# Patient Record
Sex: FEMALE | Race: WHITE | NOT HISPANIC OR LATINO | Employment: OTHER | URBAN - METROPOLITAN AREA
[De-identification: names, ages, dates, MRNs, and addresses within clinical notes are randomized per-mention and may not be internally consistent; named-entity substitution may affect disease eponyms.]

---

## 2017-01-02 ENCOUNTER — COMMUNICATION - HEALTHEAST (OUTPATIENT)
Dept: INTERNAL MEDICINE | Facility: CLINIC | Age: 62
End: 2017-01-02

## 2017-01-02 DIAGNOSIS — I10 HYPERTENSION: ICD-10-CM

## 2017-03-28 ENCOUNTER — COMMUNICATION - HEALTHEAST (OUTPATIENT)
Dept: SCHEDULING | Facility: CLINIC | Age: 62
End: 2017-03-28

## 2017-05-12 ENCOUNTER — COMMUNICATION - HEALTHEAST (OUTPATIENT)
Dept: INTERNAL MEDICINE | Facility: CLINIC | Age: 62
End: 2017-05-12

## 2017-05-12 DIAGNOSIS — Z12.83 SKIN EXAM, SCREENING FOR CANCER: ICD-10-CM

## 2017-05-22 ENCOUNTER — RECORDS - HEALTHEAST (OUTPATIENT)
Dept: ADMINISTRATIVE | Facility: OTHER | Age: 62
End: 2017-05-22

## 2017-06-06 ENCOUNTER — COMMUNICATION - HEALTHEAST (OUTPATIENT)
Dept: INTERNAL MEDICINE | Facility: CLINIC | Age: 62
End: 2017-06-06

## 2017-06-06 DIAGNOSIS — E03.9 HYPOTHYROIDISM: ICD-10-CM

## 2017-06-20 ENCOUNTER — AMBULATORY - HEALTHEAST (OUTPATIENT)
Dept: INTERNAL MEDICINE | Facility: CLINIC | Age: 62
End: 2017-06-20

## 2017-06-20 DIAGNOSIS — Z00.00 PREVENTATIVE HEALTH CARE: ICD-10-CM

## 2017-06-21 ENCOUNTER — COMMUNICATION - HEALTHEAST (OUTPATIENT)
Dept: INTERNAL MEDICINE | Facility: CLINIC | Age: 62
End: 2017-06-21

## 2017-07-26 ENCOUNTER — COMMUNICATION - HEALTHEAST (OUTPATIENT)
Dept: INTERNAL MEDICINE | Facility: CLINIC | Age: 62
End: 2017-07-26

## 2017-07-26 DIAGNOSIS — I10 HYPERTENSION: ICD-10-CM

## 2017-10-10 ENCOUNTER — RECORDS - HEALTHEAST (OUTPATIENT)
Dept: ADMINISTRATIVE | Facility: OTHER | Age: 62
End: 2017-10-10

## 2017-10-13 ENCOUNTER — COMMUNICATION - HEALTHEAST (OUTPATIENT)
Dept: INTERNAL MEDICINE | Facility: CLINIC | Age: 62
End: 2017-10-13

## 2017-10-13 DIAGNOSIS — I10 HYPERTENSION: ICD-10-CM

## 2017-10-17 ENCOUNTER — RECORDS - HEALTHEAST (OUTPATIENT)
Dept: MAMMOGRAPHY | Facility: CLINIC | Age: 62
End: 2017-10-17

## 2017-10-17 ENCOUNTER — OFFICE VISIT - HEALTHEAST (OUTPATIENT)
Dept: INTERNAL MEDICINE | Facility: CLINIC | Age: 62
End: 2017-10-17

## 2017-10-17 ENCOUNTER — COMMUNICATION - HEALTHEAST (OUTPATIENT)
Dept: INTERNAL MEDICINE | Facility: CLINIC | Age: 62
End: 2017-10-17

## 2017-10-17 DIAGNOSIS — I10 ESSENTIAL HYPERTENSION: ICD-10-CM

## 2017-10-17 DIAGNOSIS — E03.9 ACQUIRED HYPOTHYROIDISM: ICD-10-CM

## 2017-10-17 DIAGNOSIS — H61.23 EXCESSIVE CERUMEN IN BOTH EAR CANALS: ICD-10-CM

## 2017-10-17 DIAGNOSIS — Z01.818 PRE-OPERATIVE EXAMINATION FOR INTERNAL MEDICINE: ICD-10-CM

## 2017-10-17 DIAGNOSIS — R73.01 IMPAIRED FASTING GLUCOSE: ICD-10-CM

## 2017-10-17 DIAGNOSIS — Z12.31 ENCOUNTER FOR SCREENING MAMMOGRAM FOR MALIGNANT NEOPLASM OF BREAST: ICD-10-CM

## 2017-10-17 LAB
ATRIAL RATE - MUSE: 68 BPM
DIASTOLIC BLOOD PRESSURE - MUSE: NORMAL MMHG
HBA1C MFR BLD: 6.5 % (ref 3.5–6)
INTERPRETATION ECG - MUSE: NORMAL
P AXIS - MUSE: 11 DEGREES
PR INTERVAL - MUSE: 176 MS
QRS DURATION - MUSE: 150 MS
QT - MUSE: 446 MS
QTC - MUSE: 474 MS
R AXIS - MUSE: -34 DEGREES
SYSTOLIC BLOOD PRESSURE - MUSE: NORMAL MMHG
T AXIS - MUSE: -4 DEGREES
VENTRICULAR RATE- MUSE: 68 BPM

## 2017-10-17 ASSESSMENT — MIFFLIN-ST. JEOR: SCORE: 1599.11

## 2017-10-23 ENCOUNTER — RECORDS - HEALTHEAST (OUTPATIENT)
Dept: ADMINISTRATIVE | Facility: OTHER | Age: 62
End: 2017-10-23

## 2018-03-14 ENCOUNTER — COMMUNICATION - HEALTHEAST (OUTPATIENT)
Dept: INTERNAL MEDICINE | Facility: CLINIC | Age: 63
End: 2018-03-14

## 2018-03-14 DIAGNOSIS — Z12.83 SKIN EXAM, SCREENING FOR CANCER: ICD-10-CM

## 2018-03-15 ENCOUNTER — RECORDS - HEALTHEAST (OUTPATIENT)
Dept: ADMINISTRATIVE | Facility: OTHER | Age: 63
End: 2018-03-15

## 2018-04-04 ENCOUNTER — COMMUNICATION - HEALTHEAST (OUTPATIENT)
Dept: INTERNAL MEDICINE | Facility: CLINIC | Age: 63
End: 2018-04-04

## 2018-04-04 DIAGNOSIS — I10 HYPERTENSION: ICD-10-CM

## 2018-04-06 ENCOUNTER — RECORDS - HEALTHEAST (OUTPATIENT)
Dept: ADMINISTRATIVE | Facility: OTHER | Age: 63
End: 2018-04-06

## 2018-04-12 ENCOUNTER — COMMUNICATION - HEALTHEAST (OUTPATIENT)
Dept: INTERNAL MEDICINE | Facility: CLINIC | Age: 63
End: 2018-04-12

## 2018-04-12 DIAGNOSIS — I10 HYPERTENSION: ICD-10-CM

## 2018-04-18 ENCOUNTER — COMMUNICATION - HEALTHEAST (OUTPATIENT)
Dept: SCHEDULING | Facility: CLINIC | Age: 63
End: 2018-04-18

## 2018-06-01 ENCOUNTER — COMMUNICATION - HEALTHEAST (OUTPATIENT)
Dept: INTERNAL MEDICINE | Facility: CLINIC | Age: 63
End: 2018-06-01

## 2018-06-01 ENCOUNTER — OFFICE VISIT - HEALTHEAST (OUTPATIENT)
Dept: INTERNAL MEDICINE | Facility: CLINIC | Age: 63
End: 2018-06-01

## 2018-06-01 DIAGNOSIS — R73.01 IMPAIRED FASTING GLUCOSE: ICD-10-CM

## 2018-06-01 DIAGNOSIS — I10 ESSENTIAL HYPERTENSION: ICD-10-CM

## 2018-06-01 DIAGNOSIS — H61.23 BILATERAL IMPACTED CERUMEN: ICD-10-CM

## 2018-06-01 DIAGNOSIS — F41.1 ANXIETY STATE: ICD-10-CM

## 2018-06-01 DIAGNOSIS — E03.9 ACQUIRED HYPOTHYROIDISM: ICD-10-CM

## 2018-06-01 LAB
ALBUMIN SERPL-MCNC: 4.2 G/DL (ref 3.5–5)
ALP SERPL-CCNC: 67 U/L (ref 45–120)
ALT SERPL W P-5'-P-CCNC: 35 U/L (ref 0–45)
ANION GAP SERPL CALCULATED.3IONS-SCNC: 10 MMOL/L (ref 5–18)
AST SERPL W P-5'-P-CCNC: 27 U/L (ref 0–40)
BILIRUB SERPL-MCNC: 1.3 MG/DL (ref 0–1)
BUN SERPL-MCNC: 14 MG/DL (ref 8–22)
CALCIUM SERPL-MCNC: 9.6 MG/DL (ref 8.5–10.5)
CHLORIDE BLD-SCNC: 107 MMOL/L (ref 98–107)
CHOLEST SERPL-MCNC: 184 MG/DL
CO2 SERPL-SCNC: 25 MMOL/L (ref 22–31)
CREAT SERPL-MCNC: 0.73 MG/DL (ref 0.6–1.1)
ERYTHROCYTE [DISTWIDTH] IN BLOOD BY AUTOMATED COUNT: 11.2 % (ref 11–14.5)
FASTING STATUS PATIENT QL REPORTED: YES
GFR SERPL CREATININE-BSD FRML MDRD: >60 ML/MIN/1.73M2
GLUCOSE BLD-MCNC: 115 MG/DL (ref 70–125)
HBA1C MFR BLD: 6.4 % (ref 3.5–6)
HCT VFR BLD AUTO: 41.7 % (ref 35–47)
HDLC SERPL-MCNC: 39 MG/DL
HGB BLD-MCNC: 14.5 G/DL (ref 12–16)
LDLC SERPL CALC-MCNC: 118 MG/DL
MCH RBC QN AUTO: 32.2 PG (ref 27–34)
MCHC RBC AUTO-ENTMCNC: 34.8 G/DL (ref 32–36)
MCV RBC AUTO: 93 FL (ref 80–100)
PLATELET # BLD AUTO: 303 THOU/UL (ref 140–440)
PMV BLD AUTO: 7.3 FL (ref 7–10)
POTASSIUM BLD-SCNC: 4.6 MMOL/L (ref 3.5–5)
PROT SERPL-MCNC: 7.3 G/DL (ref 6–8)
RBC # BLD AUTO: 4.51 MILL/UL (ref 3.8–5.4)
SODIUM SERPL-SCNC: 142 MMOL/L (ref 136–145)
TRIGL SERPL-MCNC: 134 MG/DL
TSH SERPL DL<=0.005 MIU/L-ACNC: 1.27 UIU/ML (ref 0.3–5)
WBC: 5.5 THOU/UL (ref 4–11)

## 2018-06-19 ENCOUNTER — COMMUNICATION - HEALTHEAST (OUTPATIENT)
Dept: INTERNAL MEDICINE | Facility: CLINIC | Age: 63
End: 2018-06-19

## 2018-08-03 ENCOUNTER — COMMUNICATION - HEALTHEAST (OUTPATIENT)
Dept: INTERNAL MEDICINE | Facility: CLINIC | Age: 63
End: 2018-08-03

## 2018-08-03 DIAGNOSIS — E03.9 HYPOTHYROIDISM: ICD-10-CM

## 2018-08-17 ENCOUNTER — RECORDS - HEALTHEAST (OUTPATIENT)
Dept: ADMINISTRATIVE | Facility: OTHER | Age: 63
End: 2018-08-17

## 2018-09-30 ENCOUNTER — COMMUNICATION - HEALTHEAST (OUTPATIENT)
Dept: INTERNAL MEDICINE | Facility: CLINIC | Age: 63
End: 2018-09-30

## 2018-09-30 DIAGNOSIS — I10 HYPERTENSION: ICD-10-CM

## 2018-11-04 ENCOUNTER — COMMUNICATION - HEALTHEAST (OUTPATIENT)
Dept: INTERNAL MEDICINE | Facility: CLINIC | Age: 63
End: 2018-11-04

## 2018-11-04 DIAGNOSIS — E03.9 HYPOTHYROIDISM: ICD-10-CM

## 2018-11-27 ENCOUNTER — OFFICE VISIT - HEALTHEAST (OUTPATIENT)
Dept: INTERNAL MEDICINE | Facility: CLINIC | Age: 63
End: 2018-11-27

## 2018-11-27 ENCOUNTER — COMMUNICATION - HEALTHEAST (OUTPATIENT)
Dept: INTERNAL MEDICINE | Facility: CLINIC | Age: 63
End: 2018-11-27

## 2018-11-27 ENCOUNTER — RECORDS - HEALTHEAST (OUTPATIENT)
Dept: MAMMOGRAPHY | Facility: CLINIC | Age: 63
End: 2018-11-27

## 2018-11-27 DIAGNOSIS — Z12.31 ENCOUNTER FOR SCREENING MAMMOGRAM FOR MALIGNANT NEOPLASM OF BREAST: ICD-10-CM

## 2018-11-27 DIAGNOSIS — E03.9 ACQUIRED HYPOTHYROIDISM: ICD-10-CM

## 2018-11-27 DIAGNOSIS — K21.9 GASTROESOPHAGEAL REFLUX DISEASE WITHOUT ESOPHAGITIS: ICD-10-CM

## 2018-11-27 DIAGNOSIS — E11.9 TYPE 2 DIABETES MELLITUS WITHOUT COMPLICATION, WITHOUT LONG-TERM CURRENT USE OF INSULIN (H): ICD-10-CM

## 2018-11-27 DIAGNOSIS — R73.09 ELEVATED GLUCOSE: ICD-10-CM

## 2018-11-27 DIAGNOSIS — I10 ESSENTIAL HYPERTENSION: ICD-10-CM

## 2018-11-27 DIAGNOSIS — E11.65 TYPE 2 DIABETES MELLITUS WITH HYPERGLYCEMIA, WITHOUT LONG-TERM CURRENT USE OF INSULIN (H): ICD-10-CM

## 2018-11-27 DIAGNOSIS — Z00.00 PREVENTATIVE HEALTH CARE: ICD-10-CM

## 2018-11-27 DIAGNOSIS — E55.9 VITAMIN D DEFICIENCY: ICD-10-CM

## 2018-11-27 LAB
ALBUMIN SERPL-MCNC: 4.1 G/DL (ref 3.5–5)
ALP SERPL-CCNC: 71 U/L (ref 45–120)
ALT SERPL W P-5'-P-CCNC: 36 U/L (ref 0–45)
ANION GAP SERPL CALCULATED.3IONS-SCNC: 8 MMOL/L (ref 5–18)
AST SERPL W P-5'-P-CCNC: 24 U/L (ref 0–40)
BILIRUB SERPL-MCNC: 1.2 MG/DL (ref 0–1)
BUN SERPL-MCNC: 12 MG/DL (ref 8–22)
CALCIUM SERPL-MCNC: 9.4 MG/DL (ref 8.5–10.5)
CHLORIDE BLD-SCNC: 106 MMOL/L (ref 98–107)
CHOLEST SERPL-MCNC: 191 MG/DL
CO2 SERPL-SCNC: 30 MMOL/L (ref 22–31)
CREAT SERPL-MCNC: 0.7 MG/DL (ref 0.6–1.1)
ERYTHROCYTE [DISTWIDTH] IN BLOOD BY AUTOMATED COUNT: 11.2 % (ref 11–14.5)
FASTING STATUS PATIENT QL REPORTED: YES
GFR SERPL CREATININE-BSD FRML MDRD: >60 ML/MIN/1.73M2
GLUCOSE BLD-MCNC: 117 MG/DL (ref 70–125)
HBA1C MFR BLD: 6.6 % (ref 3.5–6)
HCT VFR BLD AUTO: 41.8 % (ref 35–47)
HDLC SERPL-MCNC: 42 MG/DL
HGB BLD-MCNC: 14.2 G/DL (ref 12–16)
LDLC SERPL CALC-MCNC: 115 MG/DL
MCH RBC QN AUTO: 31.6 PG (ref 27–34)
MCHC RBC AUTO-ENTMCNC: 34.1 G/DL (ref 32–36)
MCV RBC AUTO: 93 FL (ref 80–100)
PLATELET # BLD AUTO: 289 THOU/UL (ref 140–440)
PMV BLD AUTO: 7.6 FL (ref 7–10)
POTASSIUM BLD-SCNC: 4.7 MMOL/L (ref 3.5–5)
PROT SERPL-MCNC: 7.1 G/DL (ref 6–8)
RBC # BLD AUTO: 4.5 MILL/UL (ref 3.8–5.4)
SODIUM SERPL-SCNC: 144 MMOL/L (ref 136–145)
TRIGL SERPL-MCNC: 172 MG/DL
TSH SERPL DL<=0.005 MIU/L-ACNC: 2.1 UIU/ML (ref 0.3–5)
WBC: 5.7 THOU/UL (ref 4–11)

## 2018-12-18 ENCOUNTER — COMMUNICATION - HEALTHEAST (OUTPATIENT)
Dept: INTERNAL MEDICINE | Facility: CLINIC | Age: 63
End: 2018-12-18

## 2019-01-17 ENCOUNTER — OFFICE VISIT - HEALTHEAST (OUTPATIENT)
Dept: EDUCATION SERVICES | Facility: CLINIC | Age: 64
End: 2019-01-17

## 2019-01-17 DIAGNOSIS — E11.9 DIABETES MELLITUS, TYPE 2 (H): ICD-10-CM

## 2019-03-12 ENCOUNTER — OFFICE VISIT - HEALTHEAST (OUTPATIENT)
Dept: INTERNAL MEDICINE | Facility: CLINIC | Age: 64
End: 2019-03-12

## 2019-03-12 DIAGNOSIS — E11.9 TYPE 2 DIABETES MELLITUS WITHOUT COMPLICATION, WITHOUT LONG-TERM CURRENT USE OF INSULIN (H): ICD-10-CM

## 2019-03-12 DIAGNOSIS — I10 ESSENTIAL HYPERTENSION: ICD-10-CM

## 2019-03-12 DIAGNOSIS — R19.8 BORBORYGMI: ICD-10-CM

## 2019-03-12 DIAGNOSIS — E03.9 ACQUIRED HYPOTHYROIDISM: ICD-10-CM

## 2019-03-12 LAB
ANION GAP SERPL CALCULATED.3IONS-SCNC: 7 MMOL/L (ref 5–18)
BUN SERPL-MCNC: 14 MG/DL (ref 8–22)
CALCIUM SERPL-MCNC: 9.3 MG/DL (ref 8.5–10.5)
CHLORIDE BLD-SCNC: 106 MMOL/L (ref 98–107)
CO2 SERPL-SCNC: 27 MMOL/L (ref 22–31)
CREAT SERPL-MCNC: 0.8 MG/DL (ref 0.6–1.1)
CREAT UR-MCNC: 185.6 MG/DL
GFR SERPL CREATININE-BSD FRML MDRD: >60 ML/MIN/1.73M2
GLUCOSE BLD-MCNC: 109 MG/DL (ref 70–125)
HBA1C MFR BLD: 6.2 % (ref 3.5–6)
MICROALBUMIN UR-MCNC: 1.53 MG/DL (ref 0–1.99)
MICROALBUMIN/CREAT UR: 8.2 MG/G
POTASSIUM BLD-SCNC: 4.4 MMOL/L (ref 3.5–5)
SODIUM SERPL-SCNC: 140 MMOL/L (ref 136–145)

## 2019-03-12 ASSESSMENT — MIFFLIN-ST. JEOR: SCORE: 1603.99

## 2019-03-13 ENCOUNTER — COMMUNICATION - HEALTHEAST (OUTPATIENT)
Dept: INTERNAL MEDICINE | Facility: CLINIC | Age: 64
End: 2019-03-13

## 2019-03-26 ENCOUNTER — RECORDS - HEALTHEAST (OUTPATIENT)
Dept: HEALTH INFORMATION MANAGEMENT | Facility: CLINIC | Age: 64
End: 2019-03-26

## 2019-04-05 ENCOUNTER — COMMUNICATION - HEALTHEAST (OUTPATIENT)
Dept: INTERNAL MEDICINE | Facility: CLINIC | Age: 64
End: 2019-04-05

## 2019-04-05 DIAGNOSIS — I10 HYPERTENSION: ICD-10-CM

## 2019-05-22 ENCOUNTER — COMMUNICATION - HEALTHEAST (OUTPATIENT)
Dept: INTERNAL MEDICINE | Facility: CLINIC | Age: 64
End: 2019-05-22

## 2019-05-22 DIAGNOSIS — E03.9 HYPOTHYROIDISM: ICD-10-CM

## 2019-06-05 ENCOUNTER — COMMUNICATION - HEALTHEAST (OUTPATIENT)
Dept: INTERNAL MEDICINE | Facility: CLINIC | Age: 64
End: 2019-06-05

## 2019-06-05 ENCOUNTER — HOSPITAL ENCOUNTER (OUTPATIENT)
Dept: ULTRASOUND IMAGING | Facility: HOSPITAL | Age: 64
Discharge: HOME | End: 2019-06-05
Attending: INTERNAL MEDICINE

## 2019-06-05 DIAGNOSIS — N28.89 RIGHT KIDNEY MASS: ICD-10-CM

## 2019-06-05 DIAGNOSIS — R19.8 BORBORYGMI: ICD-10-CM

## 2019-06-06 ENCOUNTER — HOSPITAL ENCOUNTER (OUTPATIENT)
Dept: CT IMAGING | Facility: HOSPITAL | Age: 64
Discharge: HOME | End: 2019-06-06
Attending: INTERNAL MEDICINE

## 2019-06-06 DIAGNOSIS — N28.89 RIGHT KIDNEY MASS: ICD-10-CM

## 2019-06-06 LAB
CREAT BLD-MCNC: 0.6 MG/DL (ref 0.6–1.1)
GFR SERPL CREATININE-BSD FRML MDRD: >60 ML/MIN/1.73M2

## 2019-07-15 ENCOUNTER — COMMUNICATION - HEALTHEAST (OUTPATIENT)
Dept: INTERNAL MEDICINE | Facility: CLINIC | Age: 64
End: 2019-07-15

## 2019-07-15 DIAGNOSIS — R73.03 PRE-DIABETES: ICD-10-CM

## 2019-07-24 ENCOUNTER — RECORDS - HEALTHEAST (OUTPATIENT)
Dept: ADMINISTRATIVE | Facility: OTHER | Age: 64
End: 2019-07-24

## 2019-07-24 LAB — RETINOPATHY: NEGATIVE

## 2019-08-19 ENCOUNTER — COMMUNICATION - HEALTHEAST (OUTPATIENT)
Dept: INTERNAL MEDICINE | Facility: CLINIC | Age: 64
End: 2019-08-19

## 2019-08-26 ENCOUNTER — RECORDS - HEALTHEAST (OUTPATIENT)
Dept: ADMINISTRATIVE | Facility: OTHER | Age: 64
End: 2019-08-26

## 2019-08-28 ENCOUNTER — COMMUNICATION - HEALTHEAST (OUTPATIENT)
Dept: INTERNAL MEDICINE | Facility: CLINIC | Age: 64
End: 2019-08-28

## 2019-09-18 ENCOUNTER — RECORDS - HEALTHEAST (OUTPATIENT)
Dept: HEALTH INFORMATION MANAGEMENT | Facility: CLINIC | Age: 64
End: 2019-09-18

## 2019-10-09 ENCOUNTER — COMMUNICATION - HEALTHEAST (OUTPATIENT)
Dept: INTERNAL MEDICINE | Facility: CLINIC | Age: 64
End: 2019-10-09

## 2019-10-09 ENCOUNTER — OFFICE VISIT - HEALTHEAST (OUTPATIENT)
Dept: INTERNAL MEDICINE | Facility: CLINIC | Age: 64
End: 2019-10-09

## 2019-10-09 DIAGNOSIS — R05.9 COUGH: ICD-10-CM

## 2019-10-09 DIAGNOSIS — E11.9 TYPE 2 DIABETES MELLITUS WITHOUT COMPLICATION, WITHOUT LONG-TERM CURRENT USE OF INSULIN (H): ICD-10-CM

## 2019-10-09 DIAGNOSIS — H61.23 BILATERAL IMPACTED CERUMEN: ICD-10-CM

## 2019-10-09 DIAGNOSIS — E03.9 ACQUIRED HYPOTHYROIDISM: ICD-10-CM

## 2019-10-09 DIAGNOSIS — Z23 FLU VACCINE NEED: ICD-10-CM

## 2019-10-09 DIAGNOSIS — R19.8 BORBORYGMI: ICD-10-CM

## 2019-10-09 LAB
ANION GAP SERPL CALCULATED.3IONS-SCNC: 10 MMOL/L (ref 5–18)
BUN SERPL-MCNC: 12 MG/DL (ref 8–22)
CALCIUM SERPL-MCNC: 9.3 MG/DL (ref 8.5–10.5)
CHLORIDE BLD-SCNC: 108 MMOL/L (ref 98–107)
CHOLEST SERPL-MCNC: 164 MG/DL
CO2 SERPL-SCNC: 24 MMOL/L (ref 22–31)
CREAT SERPL-MCNC: 0.79 MG/DL (ref 0.6–1.1)
GFR SERPL CREATININE-BSD FRML MDRD: >60 ML/MIN/1.73M2
GLUCOSE BLD-MCNC: 131 MG/DL (ref 70–125)
HBA1C MFR BLD: 6.6 % (ref 3.5–6)
HDLC SERPL-MCNC: 48 MG/DL
LDLC SERPL CALC-MCNC: 97 MG/DL
POTASSIUM BLD-SCNC: 4.1 MMOL/L (ref 3.5–5)
SODIUM SERPL-SCNC: 142 MMOL/L (ref 136–145)
TRIGL SERPL-MCNC: 97 MG/DL
TSH SERPL DL<=0.005 MIU/L-ACNC: 2.03 UIU/ML (ref 0.3–5)

## 2019-10-09 ASSESSMENT — MIFFLIN-ST. JEOR: SCORE: 1601.84

## 2019-11-19 ENCOUNTER — COMMUNICATION - HEALTHEAST (OUTPATIENT)
Dept: INTERNAL MEDICINE | Facility: CLINIC | Age: 64
End: 2019-11-19

## 2019-11-19 DIAGNOSIS — E03.9 HYPOTHYROIDISM: ICD-10-CM

## 2020-01-15 ENCOUNTER — OFFICE VISIT - HEALTHEAST (OUTPATIENT)
Dept: INTERNAL MEDICINE | Facility: CLINIC | Age: 65
End: 2020-01-15

## 2020-01-15 ENCOUNTER — RECORDS - HEALTHEAST (OUTPATIENT)
Dept: MAMMOGRAPHY | Facility: CLINIC | Age: 65
End: 2020-01-15

## 2020-01-15 DIAGNOSIS — E11.9 TYPE 2 DIABETES MELLITUS WITHOUT COMPLICATION, WITHOUT LONG-TERM CURRENT USE OF INSULIN (H): ICD-10-CM

## 2020-01-15 DIAGNOSIS — M79.672 LEFT FOOT PAIN: ICD-10-CM

## 2020-01-15 DIAGNOSIS — I10 ESSENTIAL HYPERTENSION: ICD-10-CM

## 2020-01-15 DIAGNOSIS — E55.9 VITAMIN D DEFICIENCY: ICD-10-CM

## 2020-01-15 DIAGNOSIS — Z12.31 ENCOUNTER FOR SCREENING MAMMOGRAM FOR MALIGNANT NEOPLASM OF BREAST: ICD-10-CM

## 2020-01-15 LAB
ANION GAP SERPL CALCULATED.3IONS-SCNC: 8 MMOL/L (ref 5–18)
BUN SERPL-MCNC: 12 MG/DL (ref 8–22)
CALCIUM SERPL-MCNC: 9.1 MG/DL (ref 8.5–10.5)
CHLORIDE BLD-SCNC: 107 MMOL/L (ref 98–107)
CO2 SERPL-SCNC: 27 MMOL/L (ref 22–31)
CREAT SERPL-MCNC: 0.75 MG/DL (ref 0.6–1.1)
CREAT UR-MCNC: 237 MG/DL
GFR SERPL CREATININE-BSD FRML MDRD: >60 ML/MIN/1.73M2
GLUCOSE BLD-MCNC: 120 MG/DL (ref 70–125)
HBA1C MFR BLD: 6.4 % (ref 3.5–6)
MICROALBUMIN UR-MCNC: 2.35 MG/DL (ref 0–1.99)
MICROALBUMIN/CREAT UR: 9.9 MG/G
POTASSIUM BLD-SCNC: 4.3 MMOL/L (ref 3.5–5)
SODIUM SERPL-SCNC: 142 MMOL/L (ref 136–145)

## 2020-01-16 ENCOUNTER — COMMUNICATION - HEALTHEAST (OUTPATIENT)
Dept: INTERNAL MEDICINE | Facility: CLINIC | Age: 65
End: 2020-01-16

## 2020-01-16 LAB — 25(OH)D3 SERPL-MCNC: 38.8 NG/ML (ref 30–80)

## 2020-02-07 ENCOUNTER — RECORDS - HEALTHEAST (OUTPATIENT)
Dept: ADMINISTRATIVE | Facility: OTHER | Age: 65
End: 2020-02-07

## 2020-03-02 ENCOUNTER — OFFICE VISIT - HEALTHEAST (OUTPATIENT)
Dept: INTERNAL MEDICINE | Facility: CLINIC | Age: 65
End: 2020-03-02

## 2020-03-02 ENCOUNTER — COMMUNICATION - HEALTHEAST (OUTPATIENT)
Dept: SCHEDULING | Facility: CLINIC | Age: 65
End: 2020-03-02

## 2020-03-02 DIAGNOSIS — K21.9 GASTROESOPHAGEAL REFLUX DISEASE WITHOUT ESOPHAGITIS: ICD-10-CM

## 2020-03-02 DIAGNOSIS — R05.9 COUGH: ICD-10-CM

## 2020-03-02 DIAGNOSIS — K20.0 EOSINOPHILIC ESOPHAGITIS: ICD-10-CM

## 2020-03-02 DIAGNOSIS — R21 RASH: ICD-10-CM

## 2020-03-03 LAB
ANA SER QL: 0.3 U
B BURGDOR IGG+IGM SER QL: 0.03 INDEX VALUE

## 2020-03-04 ENCOUNTER — COMMUNICATION - HEALTHEAST (OUTPATIENT)
Dept: INTERNAL MEDICINE | Facility: CLINIC | Age: 65
End: 2020-03-04

## 2020-03-04 LAB — ANCA IGG TITR SER IF: NORMAL {TITER}

## 2020-03-05 LAB
GLIADIN IGA SER-ACNC: 3.7 U/ML
GLIADIN IGG SER-ACNC: 2.3 U/ML
IGA SERPL-MCNC: 142 MG/DL (ref 65–400)
TTG IGA SER-ACNC: 0.3 U/ML
TTG IGG SER-ACNC: <0.6 U/ML

## 2020-03-06 ENCOUNTER — COMMUNICATION - HEALTHEAST (OUTPATIENT)
Dept: INTERNAL MEDICINE | Facility: CLINIC | Age: 65
End: 2020-03-06

## 2020-03-10 ENCOUNTER — RECORDS - HEALTHEAST (OUTPATIENT)
Dept: ADMINISTRATIVE | Facility: OTHER | Age: 65
End: 2020-03-10

## 2020-03-14 ENCOUNTER — COMMUNICATION - HEALTHEAST (OUTPATIENT)
Dept: INTERNAL MEDICINE | Facility: CLINIC | Age: 65
End: 2020-03-14

## 2020-03-14 DIAGNOSIS — I10 HYPERTENSION: ICD-10-CM

## 2020-06-22 ENCOUNTER — RECORDS - HEALTHEAST (OUTPATIENT)
Dept: ADMINISTRATIVE | Facility: OTHER | Age: 65
End: 2020-06-22

## 2020-06-23 ENCOUNTER — COMMUNICATION - HEALTHEAST (OUTPATIENT)
Dept: SCHEDULING | Facility: CLINIC | Age: 65
End: 2020-06-23

## 2020-06-24 ENCOUNTER — COMMUNICATION - HEALTHEAST (OUTPATIENT)
Dept: EMERGENCY MEDICINE | Facility: HOSPITAL | Age: 65
End: 2020-06-24

## 2020-06-26 ENCOUNTER — OFFICE VISIT - HEALTHEAST (OUTPATIENT)
Dept: INTERNAL MEDICINE | Facility: CLINIC | Age: 65
End: 2020-06-26

## 2020-06-26 DIAGNOSIS — I48.3 TYPICAL ATRIAL FLUTTER (H): ICD-10-CM

## 2020-06-26 DIAGNOSIS — E11.9 TYPE 2 DIABETES MELLITUS WITHOUT COMPLICATION, WITHOUT LONG-TERM CURRENT USE OF INSULIN (H): ICD-10-CM

## 2020-06-26 DIAGNOSIS — Z09 HOSPITAL DISCHARGE FOLLOW-UP: ICD-10-CM

## 2020-06-26 DIAGNOSIS — I10 ESSENTIAL HYPERTENSION: ICD-10-CM

## 2020-07-08 ENCOUNTER — OFFICE VISIT - HEALTHEAST (OUTPATIENT)
Dept: CARDIOLOGY | Facility: CLINIC | Age: 65
End: 2020-07-08

## 2020-07-08 DIAGNOSIS — I48.3 TYPICAL ATRIAL FLUTTER (H): ICD-10-CM

## 2020-07-08 DIAGNOSIS — I10 ESSENTIAL HYPERTENSION: ICD-10-CM

## 2020-07-14 ENCOUNTER — SURGERY - HEALTHEAST (OUTPATIENT)
Dept: CARDIOLOGY | Facility: CLINIC | Age: 65
End: 2020-07-14

## 2020-07-14 ENCOUNTER — AMBULATORY - HEALTHEAST (OUTPATIENT)
Dept: CARDIOLOGY | Facility: CLINIC | Age: 65
End: 2020-07-14

## 2020-07-14 ENCOUNTER — COMMUNICATION - HEALTHEAST (OUTPATIENT)
Dept: CARDIOLOGY | Facility: CLINIC | Age: 65
End: 2020-07-14

## 2020-07-14 DIAGNOSIS — I48.92 ATRIAL FLUTTER (H): ICD-10-CM

## 2020-07-15 ENCOUNTER — AMBULATORY - HEALTHEAST (OUTPATIENT)
Dept: CARDIOLOGY | Facility: CLINIC | Age: 65
End: 2020-07-15

## 2020-07-15 DIAGNOSIS — Z11.59 ENCOUNTER FOR SCREENING FOR OTHER VIRAL DISEASES: ICD-10-CM

## 2020-08-21 ENCOUNTER — OFFICE VISIT - HEALTHEAST (OUTPATIENT)
Dept: INTERNAL MEDICINE | Facility: CLINIC | Age: 65
End: 2020-08-21

## 2020-08-21 ENCOUNTER — COMMUNICATION - HEALTHEAST (OUTPATIENT)
Dept: INTERNAL MEDICINE | Facility: CLINIC | Age: 65
End: 2020-08-21

## 2020-08-21 DIAGNOSIS — I48.92 ATRIAL FLUTTER, UNSPECIFIED TYPE (H): ICD-10-CM

## 2020-08-21 DIAGNOSIS — E03.9 ACQUIRED HYPOTHYROIDISM: ICD-10-CM

## 2020-08-21 DIAGNOSIS — Z01.818 PREOP GENERAL PHYSICAL EXAM: ICD-10-CM

## 2020-08-21 DIAGNOSIS — Z23 NEED FOR PNEUMOCOCCAL VACCINE: ICD-10-CM

## 2020-08-21 DIAGNOSIS — E11.9 TYPE 2 DIABETES MELLITUS WITHOUT COMPLICATION, WITHOUT LONG-TERM CURRENT USE OF INSULIN (H): ICD-10-CM

## 2020-08-21 DIAGNOSIS — H61.23 BILATERAL IMPACTED CERUMEN: ICD-10-CM

## 2020-08-21 LAB
ANION GAP SERPL CALCULATED.3IONS-SCNC: 11 MMOL/L (ref 5–18)
BUN SERPL-MCNC: 11 MG/DL (ref 8–22)
CALCIUM SERPL-MCNC: 8.9 MG/DL (ref 8.5–10.5)
CHLORIDE BLD-SCNC: 106 MMOL/L (ref 98–107)
CO2 SERPL-SCNC: 24 MMOL/L (ref 22–31)
CREAT SERPL-MCNC: 0.76 MG/DL (ref 0.6–1.1)
ERYTHROCYTE [DISTWIDTH] IN BLOOD BY AUTOMATED COUNT: 11.4 % (ref 11–14.5)
GFR SERPL CREATININE-BSD FRML MDRD: >60 ML/MIN/1.73M2
GLUCOSE BLD-MCNC: 114 MG/DL (ref 70–125)
HBA1C MFR BLD: 6.5 %
HCT VFR BLD AUTO: 42.2 % (ref 35–47)
HGB BLD-MCNC: 14 G/DL (ref 12–16)
MCH RBC QN AUTO: 31.8 PG (ref 27–34)
MCHC RBC AUTO-ENTMCNC: 33.2 G/DL (ref 32–36)
MCV RBC AUTO: 96 FL (ref 80–100)
PLATELET # BLD AUTO: 299 THOU/UL (ref 140–440)
PMV BLD AUTO: 7.4 FL (ref 7–10)
POTASSIUM BLD-SCNC: 4.3 MMOL/L (ref 3.5–5)
RBC # BLD AUTO: 4.41 MILL/UL (ref 3.8–5.4)
SODIUM SERPL-SCNC: 141 MMOL/L (ref 136–145)
TSH SERPL DL<=0.005 MIU/L-ACNC: 1.37 UIU/ML (ref 0.3–5)
WBC: 5.9 THOU/UL (ref 4–11)

## 2020-08-21 ASSESSMENT — MIFFLIN-ST. JEOR: SCORE: 1607.96

## 2020-08-22 ENCOUNTER — AMBULATORY - HEALTHEAST (OUTPATIENT)
Dept: FAMILY MEDICINE | Facility: CLINIC | Age: 65
End: 2020-08-22

## 2020-08-22 DIAGNOSIS — Z11.59 ENCOUNTER FOR SCREENING FOR OTHER VIRAL DISEASES: ICD-10-CM

## 2020-08-23 LAB
SARS-COV-2 PCR COMMENT: NORMAL
SARS-COV-2 RNA SPEC QL NAA+PROBE: NEGATIVE
SARS-COV-2 VIRUS SPECIMEN SOURCE: NORMAL

## 2020-08-24 ENCOUNTER — COMMUNICATION - HEALTHEAST (OUTPATIENT)
Dept: SCHEDULING | Facility: CLINIC | Age: 65
End: 2020-08-24

## 2020-08-25 ENCOUNTER — ANESTHESIA - HEALTHEAST (OUTPATIENT)
Dept: CARDIOLOGY | Facility: CLINIC | Age: 65
End: 2020-08-25

## 2020-08-25 ENCOUNTER — SURGERY - HEALTHEAST (OUTPATIENT)
Dept: CARDIOLOGY | Facility: CLINIC | Age: 65
End: 2020-08-25

## 2020-08-25 ASSESSMENT — MIFFLIN-ST. JEOR: SCORE: 1614.09

## 2020-08-30 ENCOUNTER — COMMUNICATION - HEALTHEAST (OUTPATIENT)
Dept: SCHEDULING | Facility: CLINIC | Age: 65
End: 2020-08-30

## 2020-08-31 ENCOUNTER — COMMUNICATION - HEALTHEAST (OUTPATIENT)
Dept: CARDIOLOGY | Facility: CLINIC | Age: 65
End: 2020-08-31

## 2020-09-19 ENCOUNTER — COMMUNICATION - HEALTHEAST (OUTPATIENT)
Dept: INTERNAL MEDICINE | Facility: CLINIC | Age: 65
End: 2020-09-19

## 2020-09-19 DIAGNOSIS — E03.9 HYPOTHYROIDISM: ICD-10-CM

## 2020-09-21 ENCOUNTER — COMMUNICATION - HEALTHEAST (OUTPATIENT)
Dept: INTERNAL MEDICINE | Facility: CLINIC | Age: 65
End: 2020-09-21

## 2020-09-21 DIAGNOSIS — Z23 NEED FOR PNEUMOCOCCAL VACCINE: ICD-10-CM

## 2020-09-21 RX ORDER — LEVOTHYROXINE SODIUM 75 MCG
TABLET ORAL
Qty: 90 TABLET | Refills: 3 | Status: SHIPPED | OUTPATIENT
Start: 2020-09-21 | End: 2021-11-04

## 2020-10-07 ENCOUNTER — COMMUNICATION - HEALTHEAST (OUTPATIENT)
Dept: CARDIOLOGY | Facility: CLINIC | Age: 65
End: 2020-10-07

## 2020-10-07 ENCOUNTER — AMBULATORY - HEALTHEAST (OUTPATIENT)
Dept: CARE COORDINATION | Facility: CLINIC | Age: 65
End: 2020-10-07

## 2020-10-07 DIAGNOSIS — I48.92 ATRIAL FLUTTER, UNSPECIFIED TYPE (H): ICD-10-CM

## 2020-10-08 ENCOUNTER — COMMUNICATION - HEALTHEAST (OUTPATIENT)
Dept: TELEHEALTH | Facility: CLINIC | Age: 65
End: 2020-10-08

## 2020-10-08 ENCOUNTER — OFFICE VISIT - HEALTHEAST (OUTPATIENT)
Dept: CARDIOLOGY | Facility: CLINIC | Age: 65
End: 2020-10-08

## 2020-10-08 ENCOUNTER — COMMUNICATION - HEALTHEAST (OUTPATIENT)
Dept: CARDIOLOGY | Facility: CLINIC | Age: 65
End: 2020-10-08

## 2020-10-08 DIAGNOSIS — I48.3 TYPICAL ATRIAL FLUTTER (H): ICD-10-CM

## 2020-10-08 DIAGNOSIS — I10 ESSENTIAL HYPERTENSION: ICD-10-CM

## 2020-10-08 DIAGNOSIS — I48.0 PAROXYSMAL ATRIAL FIBRILLATION (H): ICD-10-CM

## 2020-10-08 ASSESSMENT — MIFFLIN-ST. JEOR: SCORE: 1602.29

## 2020-10-12 ENCOUNTER — COMMUNICATION - HEALTHEAST (OUTPATIENT)
Dept: CARDIOLOGY | Facility: CLINIC | Age: 65
End: 2020-10-12

## 2020-10-13 ENCOUNTER — COMMUNICATION - HEALTHEAST (OUTPATIENT)
Dept: SCHEDULING | Facility: CLINIC | Age: 65
End: 2020-10-13

## 2020-10-14 ENCOUNTER — COMMUNICATION - HEALTHEAST (OUTPATIENT)
Dept: INTERNAL MEDICINE | Facility: CLINIC | Age: 65
End: 2020-10-14

## 2020-10-19 ENCOUNTER — COMMUNICATION - HEALTHEAST (OUTPATIENT)
Dept: INTERNAL MEDICINE | Facility: CLINIC | Age: 65
End: 2020-10-19

## 2020-10-27 ENCOUNTER — RECORDS - HEALTHEAST (OUTPATIENT)
Dept: ADMINISTRATIVE | Facility: OTHER | Age: 65
End: 2020-10-27

## 2020-10-30 ENCOUNTER — OFFICE VISIT - HEALTHEAST (OUTPATIENT)
Dept: INTERNAL MEDICINE | Facility: CLINIC | Age: 65
End: 2020-10-30

## 2020-10-30 DIAGNOSIS — R00.2 PALPITATIONS: ICD-10-CM

## 2020-10-30 DIAGNOSIS — I10 ESSENTIAL HYPERTENSION: ICD-10-CM

## 2020-10-30 DIAGNOSIS — Z98.890 S/P ABLATION OF ATRIAL FLUTTER: ICD-10-CM

## 2020-10-30 DIAGNOSIS — Z86.79 S/P ABLATION OF ATRIAL FLUTTER: ICD-10-CM

## 2020-10-30 DIAGNOSIS — I48.0 PAROXYSMAL ATRIAL FIBRILLATION (H): ICD-10-CM

## 2020-10-30 DIAGNOSIS — E11.9 TYPE 2 DIABETES MELLITUS WITHOUT COMPLICATION, WITHOUT LONG-TERM CURRENT USE OF INSULIN (H): ICD-10-CM

## 2020-10-30 ASSESSMENT — PATIENT HEALTH QUESTIONNAIRE - PHQ9: SUM OF ALL RESPONSES TO PHQ QUESTIONS 1-9: 0

## 2020-11-10 ENCOUNTER — HOSPITAL ENCOUNTER (OUTPATIENT)
Dept: CARDIOLOGY | Facility: HOSPITAL | Age: 65
Discharge: HOME | End: 2020-11-10
Attending: INTERNAL MEDICINE

## 2020-11-10 DIAGNOSIS — Z98.890 S/P ABLATION OF ATRIAL FLUTTER: ICD-10-CM

## 2020-11-10 DIAGNOSIS — I48.0 PAROXYSMAL ATRIAL FIBRILLATION (H): ICD-10-CM

## 2020-11-10 DIAGNOSIS — R00.2 PALPITATIONS: ICD-10-CM

## 2020-11-10 DIAGNOSIS — Z86.79 S/P ABLATION OF ATRIAL FLUTTER: ICD-10-CM

## 2020-11-17 ENCOUNTER — COMMUNICATION - HEALTHEAST (OUTPATIENT)
Dept: INTERNAL MEDICINE | Facility: CLINIC | Age: 65
End: 2020-11-17

## 2020-11-17 DIAGNOSIS — I48.3 TYPICAL ATRIAL FLUTTER (H): ICD-10-CM

## 2021-01-13 ENCOUNTER — COMMUNICATION - HEALTHEAST (OUTPATIENT)
Dept: CARDIOLOGY | Facility: CLINIC | Age: 66
End: 2021-01-13

## 2021-01-14 ENCOUNTER — OFFICE VISIT - HEALTHEAST (OUTPATIENT)
Dept: CARDIOLOGY | Facility: CLINIC | Age: 66
End: 2021-01-14

## 2021-01-14 DIAGNOSIS — I10 ESSENTIAL HYPERTENSION: ICD-10-CM

## 2021-01-14 DIAGNOSIS — I48.3 TYPICAL ATRIAL FLUTTER (H): ICD-10-CM

## 2021-01-14 DIAGNOSIS — I48.0 PAROXYSMAL ATRIAL FIBRILLATION (H): ICD-10-CM

## 2021-02-04 ENCOUNTER — COMMUNICATION - HEALTHEAST (OUTPATIENT)
Dept: SCHEDULING | Facility: CLINIC | Age: 66
End: 2021-02-04

## 2021-02-09 ENCOUNTER — OFFICE VISIT - HEALTHEAST (OUTPATIENT)
Dept: INTERNAL MEDICINE | Facility: CLINIC | Age: 66
End: 2021-02-09

## 2021-02-09 ENCOUNTER — COMMUNICATION - HEALTHEAST (OUTPATIENT)
Dept: INTERNAL MEDICINE | Facility: CLINIC | Age: 66
End: 2021-02-09

## 2021-02-09 DIAGNOSIS — R59.9 LYMPH NODE ENLARGEMENT: ICD-10-CM

## 2021-02-09 DIAGNOSIS — E11.9 TYPE 2 DIABETES MELLITUS WITHOUT COMPLICATION, WITHOUT LONG-TERM CURRENT USE OF INSULIN (H): ICD-10-CM

## 2021-02-09 DIAGNOSIS — I48.0 PAROXYSMAL ATRIAL FIBRILLATION (H): ICD-10-CM

## 2021-02-09 DIAGNOSIS — E55.9 VITAMIN D DEFICIENCY: ICD-10-CM

## 2021-02-09 DIAGNOSIS — E03.9 ACQUIRED HYPOTHYROIDISM: ICD-10-CM

## 2021-02-09 DIAGNOSIS — I10 ESSENTIAL HYPERTENSION: ICD-10-CM

## 2021-02-09 LAB
ALBUMIN SERPL-MCNC: 4.3 G/DL (ref 3.5–5)
ALP SERPL-CCNC: 72 U/L (ref 45–120)
ALT SERPL W P-5'-P-CCNC: 31 U/L (ref 0–45)
ANION GAP SERPL CALCULATED.3IONS-SCNC: 8 MMOL/L (ref 5–18)
AST SERPL W P-5'-P-CCNC: 24 U/L (ref 0–40)
BILIRUB SERPL-MCNC: 1.2 MG/DL (ref 0–1)
BUN SERPL-MCNC: 11 MG/DL (ref 8–22)
CALCIUM SERPL-MCNC: 9.3 MG/DL (ref 8.5–10.5)
CHLORIDE BLD-SCNC: 105 MMOL/L (ref 98–107)
CO2 SERPL-SCNC: 27 MMOL/L (ref 22–31)
CREAT SERPL-MCNC: 0.77 MG/DL (ref 0.6–1.1)
ERYTHROCYTE [DISTWIDTH] IN BLOOD BY AUTOMATED COUNT: 12.5 % (ref 11–14.5)
GFR SERPL CREATININE-BSD FRML MDRD: >60 ML/MIN/1.73M2
GLUCOSE BLD-MCNC: 103 MG/DL (ref 70–125)
HBA1C MFR BLD: 6.2 %
HCT VFR BLD AUTO: 44.9 % (ref 35–47)
HGB BLD-MCNC: 14.5 G/DL (ref 12–16)
MCH RBC QN AUTO: 30.9 PG (ref 27–34)
MCHC RBC AUTO-ENTMCNC: 32.3 G/DL (ref 32–36)
MCV RBC AUTO: 96 FL (ref 80–100)
PLATELET # BLD AUTO: 321 THOU/UL (ref 140–440)
PMV BLD AUTO: 9.3 FL (ref 7–10)
POTASSIUM BLD-SCNC: 4.7 MMOL/L (ref 3.5–5)
PROT SERPL-MCNC: 7.4 G/DL (ref 6–8)
RBC # BLD AUTO: 4.7 MILL/UL (ref 3.8–5.4)
SODIUM SERPL-SCNC: 140 MMOL/L (ref 136–145)
TSH SERPL DL<=0.005 MIU/L-ACNC: 1.04 UIU/ML (ref 0.3–5)
WBC: 6.3 THOU/UL (ref 4–11)

## 2021-02-10 LAB
25(OH)D3 SERPL-MCNC: 40.1 NG/ML (ref 30–80)
25(OH)D3 SERPL-MCNC: 40.1 NG/ML (ref 30–80)

## 2021-02-18 ENCOUNTER — HOSPITAL ENCOUNTER (OUTPATIENT)
Dept: ULTRASOUND IMAGING | Facility: HOSPITAL | Age: 66
Discharge: HOME | End: 2021-02-18
Attending: INTERNAL MEDICINE

## 2021-02-18 DIAGNOSIS — R59.9 LYMPH NODE ENLARGEMENT: ICD-10-CM

## 2021-02-25 ENCOUNTER — RECORDS - HEALTHEAST (OUTPATIENT)
Dept: MAMMOGRAPHY | Facility: CLINIC | Age: 66
End: 2021-02-25

## 2021-02-25 DIAGNOSIS — Z12.31 ENCOUNTER FOR SCREENING MAMMOGRAM FOR MALIGNANT NEOPLASM OF BREAST: ICD-10-CM

## 2021-03-04 ENCOUNTER — OFFICE VISIT - HEALTHEAST (OUTPATIENT)
Dept: CARDIOLOGY | Facility: CLINIC | Age: 66
End: 2021-03-04

## 2021-03-04 DIAGNOSIS — I48.0 PAROXYSMAL ATRIAL FIBRILLATION (H): ICD-10-CM

## 2021-03-07 ENCOUNTER — COMMUNICATION - HEALTHEAST (OUTPATIENT)
Dept: INTERNAL MEDICINE | Facility: CLINIC | Age: 66
End: 2021-03-07

## 2021-03-07 DIAGNOSIS — I48.3 TYPICAL ATRIAL FLUTTER (H): ICD-10-CM

## 2021-03-07 RX ORDER — METOPROLOL SUCCINATE 50 MG/1
TABLET, EXTENDED RELEASE ORAL
Qty: 90 TABLET | Refills: 2 | Status: SHIPPED | OUTPATIENT
Start: 2021-03-07 | End: 2021-07-19

## 2021-03-07 RX ORDER — APIXABAN 5 MG/1
TABLET, FILM COATED ORAL
Qty: 60 TABLET | Refills: 3 | Status: SHIPPED | OUTPATIENT
Start: 2021-03-07 | End: 2021-07-18

## 2021-04-13 ENCOUNTER — RECORDS - HEALTHEAST (OUTPATIENT)
Dept: ADMINISTRATIVE | Facility: OTHER | Age: 66
End: 2021-04-13

## 2021-04-14 ENCOUNTER — AMBULATORY - HEALTHEAST (OUTPATIENT)
Dept: SCHEDULING | Facility: CLINIC | Age: 66
End: 2021-04-14

## 2021-04-14 DIAGNOSIS — Z13.820 ENCOUNTER FOR SCREENING FOR OSTEOPOROSIS: ICD-10-CM

## 2021-04-14 DIAGNOSIS — Z78.0 POST-MENOPAUSAL: ICD-10-CM

## 2021-05-27 VITALS — DIASTOLIC BLOOD PRESSURE: 82 MMHG | SYSTOLIC BLOOD PRESSURE: 110 MMHG | HEART RATE: 70 BPM

## 2021-05-27 ASSESSMENT — PATIENT HEALTH QUESTIONNAIRE - PHQ9: SUM OF ALL RESPONSES TO PHQ QUESTIONS 1-9: 0

## 2021-05-27 NOTE — TELEPHONE ENCOUNTER
Refill Approved    Rx renewed per Medication Renewal Policy. Medication was last renewed on 10/1/18  OV 3/12/19.    Erica Desai, [facility] Triage/Med Refill 4/6/2019     Requested Prescriptions   Pending Prescriptions Disp Refills     metoprolol succinate (TOPROL-XL) 50 MG 24 hr tablet [Pharmacy Med Name: METOPROLOL SUCC ER 50 MG TAB] 90 tablet 0     Sig: TAKE 1/2 TABLET BY MOUTH 2 TIMES A DAY    Beta-Blockers Refill Protocol Passed - 4/5/2019  8:45 AM       Passed - PCP or prescribing provider visit in past 12 months or next 3 months    Last office visit with prescriber/PCP: Visit date not found OR same dept: 3/12/2019 Sandra Waters MD OR same specialty: 3/12/2019 Sandra Waters MD  Last physical: Visit date not found Last MTM visit: Visit date not found   Next visit within 3 mo: Visit date not found  Next physical within 3 mo: Visit date not found  Prescriber OR PCP: Moon Bhatt MD  Last diagnosis associated with med order: 1. Hypertension  - metoprolol succinate (TOPROL-XL) 50 MG 24 hr tablet [Pharmacy Med Name: METOPROLOL SUCC ER 50 MG TAB]; TAKE 1/2 TABLET BY MOUTH 2 TIMES A DAY  Dispense: 90 tablet; Refill: 0    If protocol passes may refill for 12 months if within 3 months of last provider visit (or a total of 15 months).            Passed - Blood pressure filed in past 12 months    BP Readings from Last 1 Encounters:   03/12/19 116/84

## 2021-05-29 NOTE — TELEPHONE ENCOUNTER
Attempted to contact pt.  Phone rang for over several minutes with no answer and no voicemail.  Will attempt to reach pt again later.  If pt calls, please relay PCP message to pt.

## 2021-05-29 NOTE — TELEPHONE ENCOUNTER
Refill Approved    Rx renewed per Medication Renewal Policy. Medication was last renewed on 11/5/18.    Joan Drummond, [facility] Triage/Med Refill 5/23/2019     Requested Prescriptions   Pending Prescriptions Disp Refills     SYNTHROID 75 mcg tablet [Pharmacy Med Name: SYNTHROID 75 MCG TABLET] 90 tablet 0     Sig: TAKE ONE TABLET BY MOUTH DAILY AT 6:00AM       Thyroid Hormones Protocol Passed - 5/22/2019  8:16 AM        Passed - Provider visit in past 12 months or next 3 months     Last office visit with prescriber/PCP: 3/12/2019 Sandra Waters MD OR same dept: 3/12/2019 Sandra Waters MD OR same specialty: 3/12/2019 Sandra Waters MD  Last physical: 10/17/2017 Last MTM visit: Visit date not found   Next visit within 3 mo: Visit date not found  Next physical within 3 mo: Visit date not found  Prescriber OR PCP: Sandra Waters MD  Last diagnosis associated with med order: 1. Hypothyroidism  - SYNTHROID 75 mcg tablet [Pharmacy Med Name: SYNTHROID 75 MCG TABLET]; TAKE ONE TABLET BY MOUTH DAILY AT 6:00AM  Dispense: 90 tablet; Refill: 0    If protocol passes may refill for 12 months if within 3 months of last provider visit (or a total of 15 months).             Passed - TSH on file in past 12 months for patient age 12 & older     TSH   Date Value Ref Range Status   11/27/2018 2.10 0.30 - 5.00 uIU/mL Final

## 2021-05-29 NOTE — TELEPHONE ENCOUNTER
Claudia, you continue to have a large stone in the gallbladder.  The liver and gallbladder otherwise look unremarkable.    Additionally, there is a new abnormality in the right kidney.  It appears to be possibly a complex cyst.  The radiologist recommends that we look a little closer with a CT scan.  Therefore, I am going to go ahead and order this.  We will have more information when the next test is complete.

## 2021-05-29 NOTE — TELEPHONE ENCOUNTER
Patient Returning Call  Reason for call:  Patient is returning a missed call  Information relayed to patient:  Message below from Sandra Waters MD relayed to the patient.  Patient has additional questions:  Yes  If YES, what are your questions/concerns:  Patient is concerned and would like to discuss this result in further detail.  Okay to leave a detailed message?: No   [phone] (No Voicemail)    Patient was in agreement to be transferred to schedule an appointment to complete the CT scan, but patient ended the call prior to the writer being able to transfer her.

## 2021-05-29 NOTE — TELEPHONE ENCOUNTER
Please contact Claudia and let her know that the CT scan was somewhat reassuring.  There is not a mass there but a cyst that has some complex features.  To be on the safe side, I would like to have her see a urologist to take a look at the CAT scan and discuss follow-up with her.  This is likely something that will need to be examined with a CT scan periodically.

## 2021-05-30 NOTE — TELEPHONE ENCOUNTER
Who is calling:  Patient   Reason for Call:  She states she recently scheduled an eye exam and then found out her exam is scheduled as a diabetic eye exam. She is confused by this as she states no one had informed her she was diabetic, just prediabetic. She is asking for clarification on this.  Date of last appointment with primary care: 3/12/2019  Okay to leave a detailed message: Yes

## 2021-05-30 NOTE — TELEPHONE ENCOUNTER
"Spoke with pt and relayed PCP message. Pt understanding and wanted to make sure eye exam wasn't being coded as \"Diabetes.\"  CA explained pt was a pre diabetic/has impaired fasting glucose per PCP message.  Pt understanding and would like a referral to ophthalmology.  "

## 2021-05-30 NOTE — TELEPHONE ENCOUNTER
She is an early diabetic/or has impaired fasting glucose.  She still needs to be assessed for eye changes

## 2021-05-31 VITALS — WEIGHT: 215.8 LBS | HEIGHT: 70 IN | BODY MASS INDEX: 30.9 KG/M2

## 2021-05-31 NOTE — TELEPHONE ENCOUNTER
Patient Returning Call  Reason for call:  Patient is returning a call.  Information relayed to patient: Below message from Yi. Patient had an eye exam with Dr. Elizabeth from [facility] in [place].  Patient has additional questions:  Yes  If YES, what are your questions/concerns:  Patient was asking if Dr. Waters had heard back about her biopsy from dermatology. Explained if patient had a biopsy on Monday, it is unlikely her results are back yet.  Okay to leave a detailed message?: Yes  [phone]

## 2021-06-01 VITALS — BODY MASS INDEX: 30.99 KG/M2 | WEIGHT: 216 LBS

## 2021-06-02 VITALS — WEIGHT: 213.38 LBS | HEIGHT: 71 IN | BODY MASS INDEX: 29.87 KG/M2

## 2021-06-02 VITALS — WEIGHT: 220.8 LBS | BODY MASS INDEX: 31.68 KG/M2

## 2021-06-02 NOTE — TELEPHONE ENCOUNTER
Patient Returning Call  Reason for call: Returning call  Information relayed to patient:    Relayed below information to patient.  Patient has additional questions:  No  If YES, what are your questions/concerns:    No additional questions at this time.  Okay to leave a detailed message?: No call back needed

## 2021-06-02 NOTE — TELEPHONE ENCOUNTER
Please let Claudia know that her x-ray is negative.  I still want her to take the medication-azithromycin as prescribed.  Follow the current plan.

## 2021-06-02 NOTE — TELEPHONE ENCOUNTER
Attempted to contact pt.  No answer and no voicemail.  Will call pt later.  If pt calls, please relay PCP message.

## 2021-06-02 NOTE — PROGRESS NOTES
[facility] Follow Up Note    Assessment/Plan:  1. Type 2 diabetes mellitus without complication, without long-term current use of insulin (H)  She has progressed from impaired fasting glucose to type 2 diabetes.  Still somewhat in denial.  Has received nutrition counseling but has had difficulty avoiding sweets.  Recommendations: Strict compliance to diabetic/low carb diet.  Regular exercise.  We will begin metformin-extended release quite slowly.  Half to 1 tablet in the evening.  - Glycosylated Hemoglobin A1c  - Basic Metabolic Panel  - metFORMIN (GLUCOPHAGE XR) 500 MG 24 hr tablet; Take 1 tablet (500 mg total) by mouth daily with supper.  Dispense: 60 tablet; Refill: 0  - Lipid [place] FASTING    2. Cough  Suspect possible aspiration with unusual gastric sounds/borborygmi, etc.  Lungs with bilateral rhonchi  Recommendation: Chest x-ray to rule out pneumonia/hiatal hernia.  Will treat prolonged cough with azithromycin.  Additionally, would like to proceed with EGD to reassess stomach and history of eosinophilic gastritis/esophagitis  - XR Chest 2 Views-x-ray personally reviewed and negative for infiltrate  - azithromycin (ZITHROMAX Z-SALVATORE) 250 MG tablet; Take 1 tablet (250 mg total) by mouth daily for 5 days. 2 tabs on day one then one tab daily  Dispense: 6 tablet; Refill: 0  - Ambulatory referral for Upper GI Endoscopy    3. Acquired hypothyroidism  We will update labs  - Thyroid [place]    4. Bilateral impacted cerumen  Bilateral cerumen extraction  - Ear cerumen removal; Future    5. Borborygmi  As above  - Ambulatory referral for Upper GI Endoscopy    6.  Complex renal cyst  Being followed by urology    7.  Hypertension  Nicely controlled on metoprolol.  She would like to reduce her medication to half tablet.  We will monitor and see.  I am cautious with this as she has been quite stable on her current regimen.    Addendum: Patient would like to try half tablet of metoprolol.  We will readdress  this at next visit and see how she is doing.    Follow-up in 2 months    Sandra Waters MD    Chief Complaint:  Chief Complaint   Patient presents with     Follow-up     Diabetes       History of Present Illness:  Claudia is a 64 y.o. female who is here today for follow-up of her usual medical problems.  Of note, she has had impaired fasting glucose/type 2 diabetes now for approximately 2 years.  She has not really fully embrace this and has not been fully compliant with a diabetic diet.  She has lost a couple of pounds since her intermediate.  However, she states she has not been exercising his vigilantly is usual.  She loves to bake.  She has not reduce sweets as thoroughly as she is needed to.  Of note, she denies polyuria or polydipsia.  In fact, she feels quite well.  Her anxiety and blood pressure have been nicely controlled.    She continues to experience difficulty with very loud bowel sounds.  She hears a clicking in her chest that she believes is coming from her stomach.  Her stomach is constantly making loud movements and motions.  Additionally, she has had a cough for greater than 1 month.  She is coughing up mucus.  She denies any symptoms of allergy or any upper respiratory symptoms.  She does not feel sick with this.    Of note, she has a renal cyst.  She has visited with the urologist on this.  Will have a follow-up CT scan in December    Review of Systems:  A comprehensive review of systems was performed and was otherwise negative    PFSH:  Social History: She is single.  She has retired from her job.  She is now working in a [employer] in [place].  She enjoys this very much  Social History     Tobacco Use   Smoking Status Never Smoker   Smokeless Tobacco Never Used       Past History:   Past Medical History:   Diagnosis Date     Anxiety      HTN (hypertension)      Hypothyroid        Current Outpatient Medications   Medication Sig Dispense Refill     aspirin (ASPIRIN LOW DOSE) 81 MG EC tablet Take 81  "mg by mouth daily.       cholecalciferol, vitamin D3, 5,000 unit Tab Take 1 tablet by mouth daily.       flash glucose scanning reader (FREESTYLE EDSON 14 DAY READER) Misc Use 1 each As Directed daily. 1 each 1     flash glucose sensor (FREESTYLE EDSON 14 DAY SENSOR) Kit Use 1 each As Directed daily. To change every 14 days 2 kit 2     metoprolol succinate (TOPROL-XL) 50 MG 24 hr tablet TAKE 1/2 TABLET BY MOUTH 2 TIMES A DAY 90 tablet 3     multivitamin capsule Take 1 capsule by mouth daily.       SYNTHROID 75 mcg tablet TAKE ONE TABLET BY MOUTH DAILY AT 6:00AM 90 tablet 1     azithromycin (ZITHROMAX Z-SALVATORE) 250 MG tablet Take 1 tablet (250 mg total) by mouth daily for 5 days. 2 tabs on day one then one tab daily 6 tablet 0     metFORMIN (GLUCOPHAGE XR) 500 MG 24 hr tablet Take 1 tablet (500 mg total) by mouth daily with supper. 60 tablet 0     No current facility-administered medications for this visit.        Family History: Nothing new    Physical Exam:  General Appearance:   Appears quite healthy and in no acute distress/conversation bright.  No anxiety noted  Vitals:    10/09/19 0738   BP: 108/80   Patient Site: Left Arm   Patient Position: Sitting   Cuff Size: Adult Regular   Pulse: 88   Weight: 212 lb 14.4 oz (96.6 kg)   Height: 5' 11\" (1.803 m)     Wt Readings from Last 3 Encounters:   10/09/19 212 lb 14.4 oz (96.6 kg)   03/12/19 213 lb 6 oz (96.8 kg)   11/27/18 220 lb 12.8 oz (100.2 kg)     Body mass index is 29.69 kg/m .    Neck exam is performed.  There is bilateral cerumen  Lung exam is significant for bilateral scattered rhonchi that remained persistent despite cough  Cardiac exam reveals a regular rate and rhythm  Abdomen is soft and nontender  Extremities are intact  Skin exam is negative for rashes    "

## 2021-06-03 VITALS
HEIGHT: 71 IN | SYSTOLIC BLOOD PRESSURE: 108 MMHG | DIASTOLIC BLOOD PRESSURE: 80 MMHG | HEART RATE: 88 BPM | WEIGHT: 212.9 LBS | BODY MASS INDEX: 29.81 KG/M2

## 2021-06-03 NOTE — TELEPHONE ENCOUNTER
Refill Approved    Rx renewed per Medication Renewal Policy. Medication was last renewed on 5/23/19.    Estephania [facility], [facility] Triage/Med Refill 11/20/2019     Requested Prescriptions   Pending Prescriptions Disp Refills     SYNTHROID 75 mcg tablet [Pharmacy Med Name: SYNTHROID 75 MCG TABLET] 90 tablet 0     Sig: TAKE ONE TABLET BY MOUTH DAILY AT 6:00AM       Thyroid Hormones Protocol Passed - 11/20/2019  4:58 PM        Passed - Provider visit in past 12 months or next 3 months     Last office visit with prescriber/PCP: 10/9/2019 Sandra Waters MD OR same dept: 10/9/2019 Sandra Waters MD OR same specialty: 10/9/2019 Sandra Waters MD  Last physical: 10/17/2017 Last MTM visit: Visit date not found   Next visit within 3 mo: Visit date not found  Next physical within 3 mo: Visit date not found  Prescriber OR PCP: Sandra Waters MD  Last diagnosis associated with med order: 1. Hypothyroidism  - SYNTHROID 75 mcg tablet [Pharmacy Med Name: SYNTHROID 75 MCG TABLET]; TAKE ONE TABLET BY MOUTH DAILY AT 6:00AM  Dispense: 90 tablet; Refill: 0    If protocol passes may refill for 12 months if within 3 months of last provider visit (or a total of 15 months).             Passed - TSH on file in past 12 months for patient age 12 & older     TSH   Date Value Ref Range Status   10/09/2019 2.03 0.30 - 5.00 uIU/mL Final

## 2021-06-04 VITALS — BODY MASS INDEX: 30.18 KG/M2 | HEIGHT: 71 IN | WEIGHT: 215.6 LBS

## 2021-06-04 VITALS
SYSTOLIC BLOOD PRESSURE: 118 MMHG | HEART RATE: 78 BPM | WEIGHT: 214 LBS | DIASTOLIC BLOOD PRESSURE: 86 MMHG | OXYGEN SATURATION: 96 % | RESPIRATION RATE: 16 BRPM | BODY MASS INDEX: 29.85 KG/M2

## 2021-06-04 VITALS
RESPIRATION RATE: 16 BRPM | OXYGEN SATURATION: 97 % | SYSTOLIC BLOOD PRESSURE: 146 MMHG | HEART RATE: 78 BPM | BODY MASS INDEX: 29.82 KG/M2 | DIASTOLIC BLOOD PRESSURE: 108 MMHG | HEIGHT: 71 IN | WEIGHT: 213 LBS

## 2021-06-04 VITALS
OXYGEN SATURATION: 96 % | SYSTOLIC BLOOD PRESSURE: 118 MMHG | DIASTOLIC BLOOD PRESSURE: 82 MMHG | BODY MASS INDEX: 29.15 KG/M2 | WEIGHT: 209 LBS | HEART RATE: 78 BPM

## 2021-06-04 VITALS
HEART RATE: 66 BPM | SYSTOLIC BLOOD PRESSURE: 112 MMHG | HEIGHT: 71 IN | OXYGEN SATURATION: 96 % | WEIGHT: 214.25 LBS | DIASTOLIC BLOOD PRESSURE: 64 MMHG | BODY MASS INDEX: 29.99 KG/M2

## 2021-06-05 VITALS
RESPIRATION RATE: 16 BRPM | WEIGHT: 210 LBS | DIASTOLIC BLOOD PRESSURE: 80 MMHG | HEART RATE: 80 BPM | SYSTOLIC BLOOD PRESSURE: 138 MMHG | OXYGEN SATURATION: 96 % | BODY MASS INDEX: 29.29 KG/M2

## 2021-06-05 VITALS
WEIGHT: 208.7 LBS | DIASTOLIC BLOOD PRESSURE: 82 MMHG | OXYGEN SATURATION: 98 % | HEART RATE: 78 BPM | SYSTOLIC BLOOD PRESSURE: 128 MMHG | BODY MASS INDEX: 29.11 KG/M2 | TEMPERATURE: 98.4 F

## 2021-06-05 NOTE — PROGRESS NOTES
[facility] Follow Up Note    Assessment/Plan:  1. Type 2 diabetes mellitus without complication, without long-term current use of insulin (H)  Glycohemoglobin at 6.4.  Other labs pending.  Of note, she did not start the metformin.  She has been working hard with diet and exercise now that she is retired.  She would like to continue further with lifestyle measures.  Recommendations: Continue as she is doing.  Work really hard to reduce starches and sugars.  Increase time at the [facility] to 5 times per week.  Recheck in the spring  - Glycosylated Hemoglobin A1c  - Microalbumin, Random Urine  - Basic Metabolic Panel; Future  - Basic Metabolic Panel    2. Hypertension  At goal on current medications    3. Vitamin D deficiency  We will update labs  - Vitamin D, Total (25-Hydroxy)    4. Left foot pain  Intermittent left foot pain-left heel  - Ambulatory referral to Orthopedics    5.  Probable GE reflux  EGD scheduled.  Encourage patient to follow through with medications as recommended by gastroenterologist.    6.  Complex renal cyst  She chose to see urology for this.  She had mentioned a follow-up CT scan in December.  This was not yet done.  She is going to contact her urology team.    Of note, she is medication averse.  She describes a fever and lack of wellbeing with red eyes following her flu shot and a azithromycin course.  Doubt true allergy.    Sandra Waters MD    Chief Complaint:  Chief Complaint   Patient presents with     Follow-up       History of Present Illness:  Claudia is a 64 y.o. female who is here today for diabetic check in.  Of note, at last visit, we discussed starting metformin.  She opted against this.  She is very medication averse.  She declines statin medications at this juncture as well.  She states she has worked hard to lose weight.  In fact, she is down 3 pounds despite the holidays.  She states she is trying to curb her appetite for simple sugars and starches.  Now that she is  retired, she has more time for the [employer].  She denies any polyuria or polydipsia.  She has not had any lightheadedness or dizziness.  She has not done the continuous glucose monitor.    She does continue to have intermittent reflux and hears a strange clicking noise in her abdomen.  She is scheduled for an EGD.    Additionally, she has a complex renal cyst.  She saw urology for this.  She is due for a CT of the abdomen.  She has not yet completed this.    She does report that she had some systemic symptoms from her last flu shot.  She felt achy.  She also developed red eyes.  She took a azithromycin.  The red eyes persisted.  She is wondering if she was having an untoward reaction to either of these things.  The symptoms did past and she is now fine.    He has intermittent left heel pain.  It comes with some swelling.  It is not active today.    Review of Systems:  A comprehensive review of systems was performed and was otherwise negative    PFSH:  Social History: She is single.  She is recently retired.  She works part-time at a [employer].  She is enjoying life  Social History     Tobacco Use   Smoking Status Never Smoker   Smokeless Tobacco Never Used       Past History:   Past Medical History:   Diagnosis Date     Anxiety      HTN (hypertension)      Hypothyroid        Current Outpatient Medications   Medication Sig Dispense Refill     aspirin (ASPIRIN LOW DOSE) 81 MG EC tablet Take 81 mg by mouth daily.       cholecalciferol, vitamin D3, 5,000 unit Tab Take 1 tablet by mouth daily.       flash glucose scanning reader (FREESTYLE EDSON 14 DAY READER) Misc Use 1 each As Directed daily. 1 each 1     flash glucose sensor (FREESTYLE EDSON 14 DAY SENSOR) Kit Use 1 each As Directed daily. To change every 14 days 2 kit 2     metFORMIN (GLUCOPHAGE XR) 500 MG 24 hr tablet Take 1 tablet (500 mg total) by mouth daily with supper. 60 tablet 0     metoprolol succinate (TOPROL-XL) 50 MG 24 hr tablet TAKE 1/2 TABLET BY MOUTH 2  TIMES A DAY 90 tablet 3     multivitamin capsule Take 1 capsule by mouth daily.       SYNTHROID 75 mcg tablet TAKE ONE TABLET BY MOUTH DAILY AT 6:00AM 90 tablet 3     No current facility-administered medications for this visit.        Family History: Nothing new    Physical Exam:  General Appearance:   Pleasant, well-appearing and in no acute distress  Vitals:    01/15/20 0751   BP: 118/82   Patient Site: Left Arm   Patient Position: Sitting   Cuff Size: Adult Large   Pulse: 78   SpO2: 96%   Weight: 209 lb (94.8 kg)     Wt Readings from Last 3 Encounters:   01/15/20 209 lb (94.8 kg)   10/09/19 212 lb 14.4 oz (96.6 kg)   03/12/19 213 lb 6 oz (96.8 kg)     Body mass index is 29.15 kg/m .

## 2021-06-06 NOTE — PATIENT INSTRUCTIONS - HE
1. For cough: there is a lot of mucus in the lung, but no wheezing. You can try albuterol inhaler 2ce  DAY to see if it can help bring it up.  Have CT scan of chest done and see pulmonary doctor.     2. Rash: less likely allergic reaction. Will check blood work today. If rash not better, see dermatologist.    3. For eosinophilic esophagitis, start back omeprazole 20 mg twice a day before meals.

## 2021-06-06 NOTE — TELEPHONE ENCOUNTER
Patient Returning Call  Reason for call:  The patient had a missed call from clinic.  She does not have voice mail.  No message noted in chart.  Please advise.   Information relayed to patient:  None  Patient has additional questions:  No  If YES, what are your questions/concerns:  NA  Okay to leave a detailed message?: No

## 2021-06-06 NOTE — TELEPHONE ENCOUNTER
"Called pt and she doesn't have VM. She said that the pulm clinic order and CT were the \"just in case\" we need to schedule after lab results are back.    If labs are back by Monday,  she would request call back then as that is the day she will be home from work.  "

## 2021-06-06 NOTE — TELEPHONE ENCOUNTER
"  Tele# [phone]       CC:  \"Pink spots on both arms\"  X 1 week       Both upper and lower arms         \"dime size\" \"filled in\" (solid appearing)       Not painful   Not itchy   No open wounds - no ulcers   No blisters   No fever     No new meds   No new foods        Has tried Epson salts at home without improvement         A/P:   > Agree with at home care thus far - appt for today to have evaluated           sony mai rn             Reason for Disposition    Mild widespread rash    Protocols used: RASH OR REDNESS - WIDESPREAD-A-OH      "

## 2021-06-06 NOTE — PROGRESS NOTES
[facility] Follow Up Note    Assessment/Plan:    1. Rash  Etiology is unclear.  She has not tried new foods or new medications recently.  Is not itchy and less likely allergic in nature.  It is also blanching so erythema nodosum is less likely but will check autoimmune blood test.  We will also check for celiac serology giving her acid reflux symptoms.  It does not necessarily look like Lyme's rash but it is migratory per patient report and she has had a bull's-eye rash several years ago but testing was negative at that time.  We will confirm that she continues to be Lyme negative.  Discussed if rash does not improve in the next week to see dermatology for a biopsy.    - Celiac(Gluten)Antibody Panel  - Antinuclear Antibody (CASSIUS) Cascade  - Anti-Neutrophil Cytoplasmic Joceline, IgG (ANCA IFA)  - Lyme Antibody [place]    2. Cough  This is chronic and nothing new.  Previous CT scan 5 years ago was negative.  Currently she denies any fevers or chills but does have a lot of mucus impaction without wheezing.  She will try albuterol twice a day to see if she can cough up the mucus and if not better have a CT scan repeated and see a pulmonologist.  On exam today does not appear that she has asthma but I wonder if she could have something like tracheobronchomalacia.  - Celiac(Gluten)Antibody Panel  - CT Chest High Resolution Without Contrast; Future  - albuterol (PROAIR HFA;PROVENTIL HFA;VENTOLIN HFA) 90 mcg/actuation inhaler; Inhale 2 puffs every 6 (six) hours as needed for wheezing.  Dispense: 1 each; Refill: 0  - Ambulatory referral to Pulmonology    3. Gastroesophageal reflux disease with history of eosinophilic esophagitis  This is persistent.  It appears that in 2014 endoscopy was positive for eosinophilic esophagitis.  Currently she is not taking PPI and I encouraged her to restarted.  She will have repeat endoscopy done again in April.  - Celiac(Gluten)Antibody Panel  - omeprazole (PRILOSEC) 20 MG capsule;  Take 1 capsule (20 mg total) by mouth 2 (two) times a day before meals.  Dispense: 60 capsule; Refill: 1    Sharee Alberto MD    Chief Complaint:  Chief Complaint   Patient presents with     Rash     spotted red spots on arms - noticed last week, not itchy       History of Present Illness:  Claudia is a 64 y.o. female , pt of Dr Adams, is currently here for acute visit due to new rash.  She also has history of well-controlled type 2 diabetes, high blood pressure, vitamin D deficiency, her significant like esophagitis, renal cyst and hypothyroidism.    Patient reports that rash started 1 week ago.  It is only on her upper extremities.  She noted red macules which were not itching or painful.  Currently they go away and reappear.  She does suffer from rosacea but has never had any problems with rashes in the past.  She denies any new medications or foods.    She also has ongoing cough.  This is chronic and etiology of it is unclear.  She denies any fevers or chills and was treated with azithromycin in the past.  Some foods can trigger it.  She also has a lot of mucus which sometimes she is unable to cough.  On exam today there is no wheezing but when she coughs there is definite mucus entrapment.  She had high-resolution CT scan done 5 years ago which was normal.  She denies any shortness of breath and able to do golfing and exercise.    She also has endoscopy schedule for April.  She has been having persistent acid reflux and GERD.  In the past she had endoscopy done in 2014 which was positive for eosinophilic esophagitis.  Since then she has been on omeprazole but did not feel that it was helping her and stopped.  In 2017 she also had colonoscopy which showed ileocecal ileitis but no other abnormalities were found.  Colonoscopy in 2012 was normal.    Review of Systems:  A comprehensive review of systems was performed and was otherwise negative    PFSH:  Social History: Reviewed  Social History     Tobacco Use    Smoking Status Never Smoker   Smokeless Tobacco Never Used     Social History     Social History Narrative     Not on file       Past History: Reviewed  Current Outpatient Medications   Medication Sig Dispense Refill     aspirin (ASPIRIN LOW DOSE) 81 MG EC tablet Take 81 mg by mouth daily.       cholecalciferol, vitamin D3, 5,000 unit Tab Take 1 tablet by mouth daily.       flash glucose scanning reader (FREESTYLE EDSON 14 DAY READER) Misc Use 1 each As Directed daily. 1 each 1     flash glucose sensor (FREESTYLE EDSON 14 DAY SENSOR) Kit Use 1 each As Directed daily. To change every 14 days 2 kit 2     metFORMIN (GLUCOPHAGE XR) 500 MG 24 hr tablet Take 1 tablet (500 mg total) by mouth daily with supper. 60 tablet 0     metoprolol succinate (TOPROL-XL) 50 MG 24 hr tablet TAKE 1/2 TABLET BY MOUTH 2 TIMES A DAY 90 tablet 3     multivitamin capsule Take 1 capsule by mouth daily.       SYNTHROID 75 mcg tablet TAKE ONE TABLET BY MOUTH DAILY AT 6:00AM 90 tablet 3     albuterol (PROAIR HFA;PROVENTIL HFA;VENTOLIN HFA) 90 mcg/actuation inhaler Inhale 2 puffs every 6 (six) hours as needed for wheezing. 1 each 0     omeprazole (PRILOSEC) 20 MG capsule Take 1 capsule (20 mg total) by mouth 2 (two) times a day before meals. 60 capsule 1     No current facility-administered medications for this visit.        Family History: Reviewed    Physical Exam:    Vitals:    03/02/20 1156   BP: 118/86   Pulse: 78   Resp: 16   SpO2: 96%   Weight: 214 lb (97.1 kg)     Wt Readings from Last 3 Encounters:   03/02/20 214 lb (97.1 kg)   01/15/20 209 lb (94.8 kg)   10/09/19 212 lb 14.4 oz (96.6 kg)     Body mass index is 29.85 kg/m .    Constitutional:  Reveals a pleasant female.  Vitals:  Per nursing notes.  HEENT:No cervical LAD, no thyromegaly,  conjunctiva is pink, no scleral icterus, TMs are visualized and normal bl, oropharynx is clear, no exudates,   Cardiac:  Regular rate and rhythm,no murmurs, rubs, or gallops.Legs without edema.  Palpation of the radial pulse regular.  Lungs: Clear to auscultation bl.  Respiratory effort normal.  No wheezes or rales.  However when she coughs there is a lot of mucus impaction and she is unable to cough up the mucus.  Abdomen:positive BS, soft, nontender, nondistended.  No hepato-splenomagaly  Skin:   Patient has moderate rosacea on her face.  She does have blanching red papular lesion on her arms and possibly right neck but nowhere else.  The not itchy.  Rheumatologic: Normal joints and nails of the hands.  Neurologic:  Cranial nerves II-XII intact.     Psychiatric: affect appropriate, memory intact.     Data Review:    Analysis and Summary of Old Records (2): yes      Records Requested (1): no      Other History Summarized (from other people in the room) (2): no    Radiology Tests Summarized (XRAY/CT/MRI/DXA) (1): ct chest    Labs Reviewed (1): yes    Medicine Tests Reviewed (EKG/ECHO/COLONOSCOPY/EGD) (1): colo,egd    Independent Review of EKG or X-RAY (2): no

## 2021-06-09 NOTE — PATIENT INSTRUCTIONS - HE
Claudia Colunga,    It was a pleasure to talk to you today from the [facility].     My recommendations after this visit include:    I discussed ablation procedure for atrial flutter today.  I discussed that it has a 99% chance that it would cure you of atrial flutter coming from the right upper chamber.  I discussed about a 50% chance that you could develop a similar rhythm atrial fibrillation at some point in the future.  You may or may not get off blood thinner with procedure.    You will get a call to schedule ablation after review/approval by Dr. Adams.    To followup with as planned after ablation procedure.      My contact information:  Stefan Kim, NEIL  After Hours or Scheduling  [phone]  My Nurse---Luci Meza [phone]

## 2021-06-09 NOTE — PROGRESS NOTES
Patient would like to be contacted via the following phone number [phone]    ASSESSMENT:    1. Hospital discharge follow-up  Events from hospitalization reviewed.  Discharge medications reviewed with patient.  Of note, she is on medications that she was on prior to entering the hospital.  She is currently doing well and has not noted any further events.    2. Typical atrial flutter (H)  Episode of atrial flutter with rapid heart rate.  Unable to get heart rate lower with an additional half tablet of metoprolol.  Therefore-presented to the ER.  Converted to normal sinus rhythm after heart rate lowered with diltiazem.  Discharged in good condition and, again, back on the usual medications.  Recommendations: Lengthy discussion was had today.  A referral was placed to electrophysiology for discussion of either antiarrhythmics versus ablation.  Of note, she is very much against taking an anticoagulant.  This was prescribed at discharge.  Chads 2 Vasc equals 4.  Have gone over risks versus benefits of anticoagulation.  Currently, she is agreeable to beginning Eliquis-at least until she has a conversation with electrophysiology cardiologist.  I have advised her to get a medic alert bracelet as she is worried about a traumatic injury and hemorrhage.  She is due for an A1c.  - Ambulatory referral to Cardiology  - apixaban ANTICOAGULANT (ELIQUIS) 5 mg Tab tablet; Take 1 tablet (5 mg total) by mouth 2 (two) times a day.  Dispense: 60 tablet; Refill: 3    3. Type 2 diabetes mellitus without complication, without long-term current use of insulin (H)  Due for A1c.  She is diet controlled.  Enjoys baking.  States she has been compliant with diabetic diet.  Lab orders left in the chart.    4. Essential hypertension  Controlled on current medications    Follow-up after visiting with cardiology    Preventive Health Care:      PLAN:  There are no Patient Instructions on file for this visit.    No orders of the defined types were  placed in this encounter.      No follow-ups on file.    CHIEF COMPLAINT:  Chief Complaint   Patient presents with     Follow-up     Atrial Fibrillation       HISTORY OF PRESENT ILLNESS:  Claudia is a 64 y.o. female calling in to the clinic today for hospital discharge follow-up.  Of note, Claudia has a history of tachycardia/arrhythmia and high blood pressure.  She also has type 2 diabetes.  She is very reluctant to take prescription medications.  Therefore, she is diet controlled.  She has been nicely controlled over the past couple of years with regard to heart rate and blood pressure.  More recently, she noted a bout of palpitations.  This occurred a month ago.  It spontaneously passed after she took an extra half tablet of metoprolol.  More recently, her symptoms did not pass with extra metoprolol.  She then presented to the emergency room.  The electrocardiogram, echocardiogram and cardiology notes are reviewed.  She was placed on a diltiazem drip.  With a reduction of heart rate, she converted to normal sinus rhythm.  She felt well and was discharged in good condition.    She was given a prescription for Eliquis upon discharge.  She did not start this medication as she is somewhat medication averse.  She states no one really explained to her the reason for this medication.  Of note, her chads 2 Vascor equals 4.    Her history is negative for stroke or TIA.  She does have significant anxiety.  She has had eosinophilic esophagitis.  REVIEW OF SYSTEMS:     All other systems are negative.    PFSH:  She is single but has a significant other with whom she spends a great deal of time.  She lives alone.  She has dogs that she walks on a daily basis.  She is a non-smoker and does not drink alcohol.    TOBACCO USE:  Social History     Tobacco Use   Smoking Status Never Smoker   Smokeless Tobacco Never Used       VITALS:  Stated Blood Pressure  No  Stated Pulse  81  Stated Weight No    PHYSICAL EXAM:  (observations via  "phone)  Her voice sounds strong and well.      ADDITIONAL HISTORY SUMMARIZED (2): Chart reviewed in depth  CARE EVERYWHERE/ EXTRA INFORMATION (1):   RADIOLOGY TESTS (1): Reviewed x-ray  LABS (1): Reviewed labs  CARDIOLOGY/MEDICINE TESTS (1): Reviewed EKGs and echo  INDEPENDENT REVIEW (2 each):     Total data points: 5    The visit lasted a total of 25 minutes   CA intake time  5  minutes    Telephone Consent was completed by  staff and confirmed by YES    I have reviewed and updated the patient's Past Medical History, Social History, Family History as appropriate.    MEDICATIONS: Reviewed and updated per CA and MD  Current Outpatient Medications   Medication Sig Dispense Refill     apixaban ANTICOAGULANT (ELIQUIS) 5 mg Tab tablet Take 1 tablet (5 mg total) by mouth 2 (two) times a day. 60 tablet 0     ascorbic acid, vitamin C, (ASCORBIC ACID) 250 mg Chew Chew 500 mg every 6 (six) hours as needed.       cholecalciferol, vitamin D3, 1,000 unit (25 mcg) tablet Take 1,000 Units by mouth daily.        flash glucose scanning reader (FREESTYLE EDSON 14 DAY READER) Misc Use 1 each As Directed daily. 1 each 1     flash glucose sensor (FREESTYLE EDSON 14 DAY SENSOR) Kit Use 1 each As Directed daily. To change every 14 days 2 kit 2     metoprolol succinate (TOPROL-XL) 50 MG 24 hr tablet TAKE 1/2 TABLET BY MOUTH 2 TIMES A DAY (Patient taking differently: Take 50 mg by mouth daily. ) 90 tablet 3     multivitamin capsule Take 1 capsule by mouth daily.       SYNTHROID 75 mcg tablet TAKE ONE TABLET BY MOUTH DAILY AT 6:00AM 90 tablet 3     No current facility-administered medications for this visit.            The patient has been notified of following:     \"This telephone visit will be conducted via a call between you and your physician/provider. We have found that certain health care needs can be provided without the need for a physical exam.  This service lets us provide the care you need with a short phone " "conversation.  If a prescription is necessary we can send it directly to your pharmacy.  If lab work is needed we can place an order for that and you can then stop by our lab to have the test done at a later time.    If during the course of the call the physician/provider feels a telephone visit is not appropriate, you will not be charged for this service.\"   "

## 2021-06-09 NOTE — PROGRESS NOTES
Pt has been having some headaches that she thinks may be from the Eliquis. She does report that the headaches have improved. All other review of systems are within normal limits.

## 2021-06-09 NOTE — PROGRESS NOTES
H&P Jan 7/8/20 Teach  I Order X P Order X Letter    COVID  Monik elqiburt Meds  Hold metoprolol night prior and AM of       1955  Home:[phone] (home) Cell:[phone] (mobile)  Emergency Contact: Henri Ferrer [phone]    +++Important patient information for CSC/Cath Lab staff : None+++    [facility] EP Cath Lab Procedure Order   Ablation Type:  Atrial Flutter  Specific location of pathway: Right     Diagnosis:  Aflutter  Anticipated Case Duration:  Standard   Scheduling Needs/Timeframe:  COVID Scheduling- time sensative within 1-2mo  Scheduling Restrictions: None  EP RN Follow Up Apt: Does not need follow up apt with EP RN    MD Preference: Dr Bryan MOSES Assist:  No Specific MD:  N/A    Current Device: None None  Device Company/Device Rep Needed for Procedure: None    Pre-Procedural Testing needed: COVID 19 nasal/lab test within 48hrs of procedure  Mapping System Required:  CARLINE  ICE Needed:  No  Special equipment/Staff needed for case: None  Anesthesia:  CV RN precedex  Research Protocol:  No    [facility] EP Cath Lab Prep   Ordering Provider: Dr Adams  Ordering Date: 7/14/2020  Orders Status: Intial order placed and Order set placed    H&P:  Compled by Jan H on 7/8/20 if scheduled within 30 days, pt to schedule with PMD if procedure outside of this timeframe  PCP: Sandra Waters MD, [phone]    Pre-op Labs: Ordered AM of procedure    Medical Records Pertinent for Procedure:  Echo 6/23/20 and EKG 6/23/20    Patient Education:    Teach with Patient: Will be completed via phone prior to procedure, and letter was also sent to pt via mail/[employer] with written pre-procedural instructions.    Risks Reviewed:        Cardiac Catheter Ablation    <1% risk for the following: hypotension, hemorrhage, vascular injury including perforation of vein, artery or heart, thrombophlebitis, systemic or pulmonic emboli; cardiac perforation, (tamponade), infection, pneumothorax, arrhythmias, proarrhythmic effects of drugs,  radiation exposure.    1-2% complete heart block (for AVNRT or septol accessory pathway).    <0.5% CVA or MI    <0.1% death    If external defibrillation is needed, 75% risk for superficial burn.    1-2% tamponade and aortic puncture with left sided transeptal approach for left side CARLINE - increase risk of CVA to <2%.    Late arrhythmia recurrences depends upon the primary rhythm disturbance.      Pre-Procedure Instructions that were Reviewed with Patient:  NPO after midnight, Remove all jewelry prior to coming in for procedure, Shower prior to arrival, Notified patient of time and date of procedure by CV , Transportation arrangements needed s/p procedure, Post-procedure follow up process, Sedation plan/orders and Pre-procedure letter was sent to pt by CV     Pre-Procedure Medication Instructions:  Instructions given to pt regarding anticoagulants: Eliquis- instructed to continue anticoagulation uninterrupted through their procedure  Instructions given to pt regarding antiarrhythmic medication: Beta Blocker; hold night prior and AM of procedure  Instructions given to pt regarding PPI medication:N/A  Instructions for medication, other than anticoagulants and antiarrhythmics listed above, given to pt: to take all morning medications with small sips of water, with the exception of OTC supplements and MVI   Allergies: Reviewed allergies, no concerns regarding orders for procedure  Allergies   Allergen Reactions     Lisinopril Rash     Penicillins Rash       Current Outpatient Medications:      apixaban ANTICOAGULANT (ELIQUIS) 5 mg Tab tablet, Take 1 tablet (5 mg total) by mouth 2 (two) times a day., Disp: 60 tablet, Rfl: 3     ascorbic acid, vitamin C, (ASCORBIC ACID) 250 mg Chew, Chew 500 mg every 6 (six) hours as needed., Disp: , Rfl:      flash glucose scanning reader (FREESTYLE EDSON 14 DAY READER) Misc, Use 1 each As Directed daily., Disp: 1 each, Rfl: 1     flash glucose sensor (FREESTYLE EDSON 14  DAY SENSOR) Kit, Use 1 each As Directed daily. To change every 14 days, Disp: 2 kit, Rfl: 2     metoprolol succinate (TOPROL-XL) 50 MG 24 hr tablet, TAKE 1/2 TABLET BY MOUTH 2 TIMES A DAY (Patient taking differently: Take 50 mg by mouth daily. ), Disp: 90 tablet, Rfl: 3     multivitamin capsule, Take 1 capsule by mouth daily., Disp: , Rfl:      SYNTHROID 75 mcg tablet, TAKE ONE TABLET BY MOUTH DAILY AT 6:00AM, Disp: 90 tablet, Rfl: 3    Documentation Date:7/14/2020 9:40 AM  Olinda Avina RN

## 2021-06-09 NOTE — TELEPHONE ENCOUNTER
Patient called and states she has had a rapid pulse for 2.5 hours. Now it is about 132, and has stayed that way most of the time. Using a home oximeter to test this. States that her oxygen is normal. The rapid heart rate has been constant and kind of feels like a fluttering    Patient attempted to treat herself by taking another metoprolol, states that it usually calms it down. It did not help much tonight. Took the metoprolol at midnight. It seemed like it was starting to come down but then it shot back up    /101  Mild chest pain, that she rates about a 3/10  Slightly lightheaded    Has history of right bundle branch block  No other heart conditions   No hx of heart attack   No hx AFib  No hx of lung disease  No hx of surgical intervention on heart    Patient states that this happened last month too, lasted a couple of hours, then just went away. It was in the afternoon on this occasion. Patient states that she did not go to the doctor then or since then for this episode.     No shooting/radiating pain/discomfort anywhere  No dizziness  No sweating   No difficulty breathing   No fever   No cough  No recent travel  No known exposure to COVID19    Triaged to a disposition of Go to ED. Patient is agreeable. Plans to go to [facility]. Will have to call someone to pick her up. If this is an unreasonable time, or she becomes worse, she will call EMS.     COVID 19 Nurse Triage Plan/Patient Instructions    Please be aware that novel coronavirus (COVID-19) may be circulating in the community. If you develop symptoms such as fever, cough, or SOB or if you have concerns about the presence of another infection including coronavirus (COVID-19), please contact your health care provider or visit [email or web address].     Disposition/Instructions    Patient to go to ED and follow protocol based instructions.     Bring Your Own Device:  Please also bring your smart device(s) (smart phones, tablets, laptops) and their charging  cables for your personal use and to communicate with your care team during your visit.   and Patient to call EMS/911 and follow protocol based instructions.     Bring Your Own Device:  Please also bring your smart device(s) (smart phones, tablets, laptops) and their charging cables for your personal use and to communicate with your care team during your visit.      Thank you for taking steps to prevent the spread of this virus.  o Limit your contact with others.  o Wear a simple mask to cover your cough.  o Wash your hands well and often.    Resources    [facility]: About COVID-19: [email or web address]    CDC: What to Do If You're Sick: www.cdc.gov/coronavirus/2019-ncov/about/steps-when-sick.html    CDC: Ending Home Isolation: www.cdc.gov/coronavirus/2019-ncov/hcp/disposition-in-home-patients.html     CDC: Caring for Someone: www.cdc.gov/coronavirus/2019-ncov/if-you-are-sick/care-for-someone.html     [facility]: Interim Guidance for Hospital Discharge to Home: [email or web address]/coronavirus/hcp/hospdischarge.pdf    [facility] clinical trials (COVID-19 research studies): [email or web address]     Below are the COVID-19 hotlines at the [place] [facility] ([facility]). Interpreters are available.   o For health questions: Call 801-107-0649 or 1-827.560.1554 (7 a.m. to 7 p.m.)  o For questions about schools and childcare: Call 100-657-9633 or 1-661.135.2421 (7 a.m. to 7 p.m.)       Reason for Disposition    Dizziness, lightheadedness, or weakness    Dizziness or lightheadedness    [1] Systolic BP  >= 160 OR Diastolic >= 100 AND [2] cardiac or neurologic symptoms (e.g., chest pain, difficulty breathing, unsteady gait, blurred vision)    Additional Information    Negative: Passed out (i.e., lost consciousness, collapsed and was not responding)    Negative: Shock suspected (e.g., cold/pale/clammy skin, too weak to stand, low BP, rapid pulse)    Negative:  Difficult to awaken or acting confused (e.g., disoriented, slurred speech)    Negative: Visible sweat on face or sweat dripping down face    Negative: Unable to walk, or can only walk with assistance (e.g., requires support)    Negative: [1] Received SHOCK from implantable cardiac defibrillator AND [2] persisting symptoms (i.e., palpitations, lightheadedness)    Negative: Sounds like a life-threatening emergency to the triager    Chest pain    Negative: Difficult to awaken or acting confused (e.g., disoriented, slurred speech)    Negative: Severe difficulty breathing (e.g., struggling for each breath, speaks in single words)    Negative: [1] Weakness of the face, arm or leg on one side of the body AND [2] new onset    Negative: [1] Numbness (i.e., loss of sensation) of the face, arm or leg on one side of the body AND [2] new onset    Negative: [1] Chest pain lasts > 5 minutes AND [2] history of heart disease  (i.e., heart attack, bypass surgery, angina, angioplasty, CHF)    Negative: [1] Chest pain AND [2] took nitrogylcerin AND [3] pain was not relieved    Negative: Sounds like a life-threatening emergency to the triager    Negative: Symptom is main concern  (e.g., headache, chest pain)    Negative: Low blood pressure is main concern    Protocols used: HEART RATE AND HEARTBEAT [id number], CHEST PAIN-A-AH, HIGH BLOOD PRESSURE-A-AH    Wendy Rodriguez RN Triage Nurse Advisor     , father

## 2021-06-10 NOTE — PROGRESS NOTES
[facility] Clinic Follow Up Note    Assessment/Plan:  1. Preop general physical exam  As noted in the previous note, patient is medically stable for anesthesia prior to her ablation for atrial flutter.  She is advised to continue on her Eliquis as directed by cardiology.  She will take her last dose of metoprolol 24 hours prior to her procedure.    - HM2(CBC w/o Differential)  - Basic Metabolic Panel    2. Atrial flutter, unspecified type (H)  As above    3. Type 2 diabetes mellitus without complication, without long-term current use of insulin (H)  She has type 2 diabetes mellitus without complication.  She has been reluctant to start any medications.  Her weight is up today.  Recommendation: We will recheck labs.  - Glycosylated Hemoglobin A1c    4. Acquired hypothyroidism  She is due for a thyroid [place]ascade  - Thyroid [place]    5. Bilateral impacted cerumen  Ear exam reveals bilateral cerumen  - Ear wax removal    6. Need for pneumococcal vaccine  Have discussed preventative medicine.  She should have a 23 valent pneumococcal pneumonia vaccine and flu shot in September.  - Pneumococcal polysaccharide vaccine 23-valent 3 yo or older, subq/IM; Future          Sandra Ade Waters MD    Chief Complaint:  Chief Complaint   Patient presents with     Pre-op Exam       History of Present Illness:  Claudia is a 64 y.o. female who is here today for preoperative examination prior to a cardiac ablation.  She is feeling very nervous about her procedure.  Currently, she is feeling well.  She denies any new problems.  She has no symptoms of an infectious disease nature.  Aside from some dyspnea with significant exertion-i.e. going up 2 flights of stairs-she is feeling fine.  She notes occasional extrasystoles.    Pertinent anesthesia history is reviewed.  There is a family history of DVT in her father-which occurred postoperatively.  She has no personal history of untoward reactions to local or general anesthetic agents.  She  "has no personal history of a bleeding disorder or procoagulant disorder.  She does not have a history of primary coronary artery disease, chronic lung or kidney disease.  She is a diabetic and is overweight.  She is able to achieve 4 METS of activity.  Is a non-smoker.    Review of Systems:  A comprehensive review of systems was performed and was otherwise negative    PFSH:  Social History: She is single and lives independently.  She has a significant other  Social History     Tobacco Use   Smoking Status Never Smoker   Smokeless Tobacco Never Used       Past History:   Past Medical History:   Diagnosis Date     Anxiety      Essential hypertension      Hypothyroid      Overflow incontinence        Current Outpatient Medications   Medication Sig Dispense Refill     apixaban ANTICOAGULANT (ELIQUIS) 5 mg Tab tablet Take 1 tablet (5 mg total) by mouth 2 (two) times a day. 60 tablet 3     metoprolol succinate (TOPROL-XL) 50 MG 24 hr tablet TAKE 1/2 TABLET BY MOUTH 2 TIMES A DAY (Patient taking differently: Take 50 mg by mouth daily. ) 90 tablet 3     multivitamin capsule Take 1 capsule by mouth daily.       SYNTHROID 75 mcg tablet TAKE ONE TABLET BY MOUTH DAILY AT 6:00AM 90 tablet 3     No current facility-administered medications for this visit.        Family History: Family history of DVT    Physical Exam:  General Appearance:   Appears well and in no acute distress  Vitals:    08/21/20 0926   BP: 112/64   Patient Site: Left Arm   Patient Position: Sitting   Cuff Size: Adult Regular   Pulse: 66   SpO2: 96%   Weight: 214 lb 4 oz (97.2 kg)   Height: 5' 11\" (1.803 m)     Wt Readings from Last 3 Encounters:   08/21/20 214 lb 4 oz (97.2 kg)   06/23/20 210 lb (95.3 kg)   03/02/20 214 lb (97.1 kg)     Body mass index is 29.88 kg/m .    EYES: Eyelids, conjunctiva, and sclera were normal. Pupils were normal. Cornea, iris, and lens were normal bilaterally.  HEAD, EARS, NOSE, MOUTH, AND THROAT: Head and face were normal. Hearing " was normal to voice and the ears were normal to external exam. Nose appearance was normal and there was no discharge. Oropharynx was normal.  TMs were normal.  NECK: Neck appearance was normal. There were no neck masses and the thyroid was not enlarged.  RESPIRATORY: Breathing pattern was normal and the chest moved symmetrically.   Lung sounds were equal bilaterally.  CARDIOVASCULAR: Heart rate and rhythm were normal.  S1 and S2 were normal and there were no extra sounds or murmurs. Peripheral pulses in arms and legs were normal.  Jugular venous pressure was normal.  There was no peripheral edema.  GASTROINTESTINAL: The abdomen was normal in contour.  Bowel sounds were present.   Palpation detected no tenderness, mass, or enlarged organs.   MUSCULOSKELETAL: Skeletal configuration was normal and muscle mass was normal for age. Joint appearance was overall normal.  LYMPHATIC: There were no enlarged nodes.  SKIN/HAIR/NAILS: Skin color was normal.  There were no abnormal skin lesions.  Hair and nails were normal.  NEUROLOGIC: The patient was alert and oriented to person, place, time, and circumstance. Speech was normal. Cranial nerves were normal. Motor strength was normal for age. The patient was normally coordinated.  PSYCHIATRIC:  Mood and affect were normal and the patient had normal recent and remote memory. The patient's judgment and insight were normal.

## 2021-06-10 NOTE — PROGRESS NOTES
Bilateral ear wash done, small amount of wax removed from both ears without difficulty pt tolerated well

## 2021-06-10 NOTE — ANESTHESIA POSTPROCEDURE EVALUATION
Patient: Claudia Colunga  Procedure(s) with comments:  EP Ablation Atrial Flutter (Right) - 6AM ADMIT, CONS SEDATION W/CV RN SUPPORTED PRECEDEX, NO DEVICE, CARLINE NOTIFIED-7/14, H&P-PMD, TEACH-EP RNS  Precedex wtih CV RN  Intraprocedure External Cardioversion  Anesthesia type: general    Patient location: Telemetry/Step Down Unit  Last vitals:   Vitals Value Taken Time   BP 97/53 8/25/2020  1:31 PM   Temp 36.5  C (97.7  F) 8/25/2020 12:10 PM   Pulse 62 8/25/2020  1:40 PM   Resp 14 8/25/2020  1:40 PM   SpO2 100 % 8/25/2020  1:35 PM   Vitals shown include unvalidated device data.  Post vital signs: stable  Level of consciousness: awake and responds to simple questions  Post-anesthesia pain: pain controlled  Post-anesthesia nausea and vomiting: no  Pulmonary: unassisted, return to baseline  Cardiovascular: stable and blood pressure at baseline  Hydration: adequate  Anesthetic events: no    QCDR Measures:  ASA# 11 - Tete-op Cardiac Arrest: ASA11B - Patient did NOT experience unanticipated cardiac arrest  ASA# 12 - Tete-op Mortality Rate: ASA12B - Patient did NOT die  ASA# 13 - PACU Re-Intubation Rate: NA - No ETT / LMA used for case  ASA# 10 - Composite Anes Safety: ASA10A - No serious adverse event    Additional Notes:

## 2021-06-10 NOTE — PROGRESS NOTES
[facility]  [address]  [place] [address]  Dept: [phone]  Dept Fax: [phone]  Primary Provider: Devin Waters MD  Pre-op Performing Provider: DEVIN WATERS    PREOPERATIVE EVALUATION:  Today's date: 8/21/2020    Claudia Colunga is a 64 y.o. female who presents for a preoperative evaluation.    Surgical Information:  Surgery Details 8/21/2020   Surgery/Procedure: Cardiac Ablation   Surgery Location: [facility]   Surgeon: Dr Adams   Surgery Date: 8/25/20   Time of Surgery: TBD   Where patient plans to recover: at home with family       Subjective     HPI related to upcoming procedure: Claudia is a pleasant 64-year-old female who is here today for a preoperative physical examination prior to a cardiac ablation procedure.  Of note, her history is significant for paroxysmal atrial flutter with rapid rate-now on metoprolol and Eliquis.  Additionally, she is a type II diabetic and has been reluctant to take medications.  She has hypothyroidism and a history of eosinophilic esophagitis.    Today, she is feeling well.  She has not experienced any symptoms of an infectious disease nature.  She does report some dyspnea ascending 2-3 sets of stairs.  She is not sure if this is from her beta-blocker medication or deconditioning.  She does report that she has occasional palpitations.  She does not believe that she has had any prolonged runs of atrial flutter recently but has difficulty perceiving this.  She denies chest pain, syncope.    Pertinent anesthesia history is reviewed: She denies any untoward reactions to local or anesthetic agents.  There is no history of bleeding disorder or procoagulant disorder although she is currently on Eliquis.  There is a family history of DVT in her father who had a postoperative DVT following open heart surgery.  She has no history of chronic lung, kidney disease.  She is a type II diabetic.  She is able to achieve 4  METS of activity.    Preop Questions 8/21/2020   Have you ever had a heart attack or stroke? No   Have you ever had surgery on your heart or blood vessels, such as a stent placement, a coronary artery bypass, or surgery on an artery in your head, neck, heart, or legs? No   Do you have chest pain with activity? No   Do you have a history of  heart failure? No   Do you currently have a cold, bronchitis or symptoms of other infection? No   Do you have a cough, shortness of breath, or wheezing? No   Do you or anyone in your family have previous history of blood clots? YES -father had a postoperative DVT following open heart surgery   Do you or does anyone in your family have a serious bleeding problem such as prolonged bleeding following surgeries or cuts? No   Have you ever had problems with anemia or been told to take iron pills? No   Have you had any abnormal blood loss such as black, tarry or bloody stools, or abnormal vaginal bleeding? No   Have you ever had a blood transfusion? No   Are you willing to have a blood transfusion if it is medically needed before, during, or after your surgery? Yes   Have you or any of your relatives ever had problems with anesthesia? No   Do you have sleep apnea, excessive snoring or daytime drowsiness? No   Do you have any artifical heart valves or other implanted medical devices like a pacemaker, defibrillator, or continuous glucose monitor? No   Do you have artificial joints? No   Are you allergic to latex? No   Is there any chance that you may be pregnant? No             Review of Systems  A comprehensive review of systems is reviewed.  Patient denies chest pain, shortness of breath, bowel or bladder issues, constitutional symptoms of fever, chills, night sweats.  The remainder of the review is performed and is otherwise negative.    Patient Active Problem List    Diagnosis Date Noted     Atrial flutter (H) 07/14/2020     Typical atrial flutter (H) 06/23/2020     Statin declined  01/20/2020     Type 2 diabetes mellitus without complication, without long-term current use of insulin (H) 10/09/2019     Vitamin D deficiency      Eosinophilic gastroenteritis      Eosinophilic esophagitis 08/14/2014     Anxiety      Hypothyroidism      Essential hypertension      Past Medical History:   Diagnosis Date     Anxiety      Essential hypertension      Hypothyroid      Overflow incontinence      Past Surgical History:   Procedure Laterality Date     OVARIAN CYST SURGERY       TN BIOPSY OF BREAST, INCISIONAL       TN BIOPSY OF BREAST, INCISIONAL       Current Outpatient Medications   Medication Sig Dispense Refill     apixaban ANTICOAGULANT (ELIQUIS) 5 mg Tab tablet Take 1 tablet (5 mg total) by mouth 2 (two) times a day. 60 tablet 3     metoprolol succinate (TOPROL-XL) 50 MG 24 hr tablet TAKE 1/2 TABLET BY MOUTH 2 TIMES A DAY (Patient taking differently: Take 50 mg by mouth daily. ) 90 tablet 3     multivitamin capsule Take 1 capsule by mouth daily.       SYNTHROID 75 mcg tablet TAKE ONE TABLET BY MOUTH DAILY AT 6:00AM 90 tablet 3     ascorbic acid, vitamin C, (ASCORBIC ACID) 250 mg Chew Chew 500 mg every 6 (six) hours as needed.       No current facility-administered medications for this visit.        Allergies   Allergen Reactions     Lisinopril Rash     Penicillins Rash       Social History     Tobacco Use     Smoking status: Never Smoker     Smokeless tobacco: Never Used   Substance Use Topics     Alcohol use: Yes     Comment: rare, only with dinner out with friends      Family History   Problem Relation Age of Onset     Breast cancer Maternal cousin      Dementia Mother 91     Hip fracture Mother 93        went to nursing home after this     Stroke Mother 96        had to be fed by hand prior to her death     Colon cancer Father      Aortic aneurysm Father         abdominal, repaired     Cerebral aneurysm Father      Peripheral vascular disease Father 96        ruptured femoral aneurysm?      "Diabetes type II Sister      Heart disease Neg Hx      Social History     Substance and Sexual Activity   Drug Use No           Objective   /64 (Patient Site: Left Arm, Patient Position: Sitting, Cuff Size: Adult Regular)   Pulse 66   Ht 5' 11\" (1.803 m)   Wt 214 lb 4 oz (97.2 kg)   SpO2 96%   BMI 29.88 kg/m    Physical Exam    GENERAL APPEARANCE: healthy, alert and no distress     EYES: EOMI,  PERRL     HENT: ear canals and TM's normal and nose and mouth without ulcers or lesions     NECK: no adenopathy, no asymmetry, masses, or scars and thyroid normal to palpation     RESP: lungs clear to auscultation - no rales, rhonchi or wheezes     CV: regular rates and rhythm, normal S1 S2, no S3 or S4 and no murmur, click or rub     ABDOMEN:  soft, nontender, no HSM or masses and bowel sounds normal     MS: extremities normal- no gross deformities noted, no evidence of inflammation in joints, FROM in all extremities.     SKIN: no suspicious lesions or rashes     NEURO: Normal strength and tone, sensory exam grossly normal, mentation intact and speech normal     PSYCH: mentation appears normal. and affect normal/bright     LYMPHATICS: No cervical adenopathy      Laboratory studies are updated and are pending  Recent Labs   Lab Test 06/23/20  0222 01/15/20  0738  11/27/18  0800   HGB 14.6  --   --  14.2     --   --  289    142   < > 144   K 3.9 4.3   < > 4.7   CREATININE 0.82 0.75   < > 0.70    < > = values in this interval not displayed.        PRE-OP Diagnostics:   Labs pending at this time. Results will be reviewed when available.  No EKG this visit, completed in the last 90 days.         Assessment & Plan      The patient will proceed with scheduled procedure.  Medications were reviewed by cardiology nurse.  She will continue with Eliquis as ordered.  She will take her last dose of metoprolol 24 hours prior to her scheduled procedure.  She will otherwise be n.p.o.    The proposed surgical procedure " is considered LOW risk.    REVISED CARDIAC RISK INDEX   The patient has the following serious cardiovascular risks for perioperative complications:  Diabetes Mellitus (on Insulin) = 1 point    INTERPRETATION: 1 point: Class II (low risk - 0.9% complication rate)      ICD-10-CM    1. Preop general physical exam  Z01.818 HM2(CBC w/o Differential)     Basic Metabolic Panel   2. Atrial flutter, unspecified type (H)  I48.92    3. Type 2 diabetes mellitus without complication, without long-term current use of insulin (H)  E11.9 Glycosylated Hemoglobin A1c   4. Acquired hypothyroidism  E03.9 Thyroid Cascade   5. Bilateral impacted cerumen  H61.23 Ear wax removal       The patient has the following additional risks and recommendations for perioperative complications:     - No identified additional risk factors other than previously addressed     MEDICATION INSTRUCTIONS:        No follow-ups on file.    Signed Electronically by: Sandra Waters MD    Copy of this evaluation report is provided to requesting physician.    [facility] Preop Guidelines    Revised Cardiac Risk Index

## 2021-06-10 NOTE — ANESTHESIA CARE TRANSFER NOTE
Last vitals:   Vitals:    08/25/20 0956   BP: 97/72   Pulse: (!) 120   Resp: 16   Temp:    SpO2: 98%     Patient's level of consciousness is drowsy  Spontaneous respirations: yes  Maintains airway independently: yes  Dentition unchanged: yes  Oropharynx: oropharynx clear of all foreign objects    QCDR Measures:  ASA# 20 - Surgical Safety Checklist: WHO surgical safety checklist completed prior to induction    PQRS# 430 - Adult PONV Prevention: NA - Not adult patient, not GA or 3 or more risk factors NOT present  ASA# 8 - Peds PONV Prevention: NA - Not pediatric patient, not GA or 2 or more risk factors NOT present  PQRS# 424 - Tete-op Temp Management: NA - MAC anesthesia or case < 60 minutes  PQRS# 426 - PACU Transfer Protocol:NA - Patient did not go to PACU  ASA# 14 - Acute Post-op Pain: ASA14B - Patient did NOT experience pain >= 7 out of 10

## 2021-06-10 NOTE — ANESTHESIA PREPROCEDURE EVALUATION
Anesthesia Evaluation      No history of anesthetic complications     Airway   Mallampati: II   Pulmonary - negative ROS                          Cardiovascular   Exercise tolerance: > or = 4 METS  (+) hypertension, dysrhythmias (a flutter s/f ablation under conscious sedation - new onset afib, anesthesia requested for emergent cardioversion), ,      Neuro/Psych - negative ROS     Endo/Other    (+) diabetes mellitus type 2, hypothyroidism,      GI/Hepatic/Renal    (+) GERD well controlled, esophageal disease,       Other findings: Pt on precedex 0.5 and had received fentanyl 75mcg prior to our arrival - consent obtained emergently through two physician consent       Dental                         Anesthesia Plan  Planned anesthetic: general mask  Brief ga mask w/ brevital IV  ASA 3 - emergent   Induction: intravenous   Plan/risks discussed with: emergency two physician consent.    Post-op plan: routine recovery

## 2021-06-10 NOTE — PROGRESS NOTES
Pre-Intra-Post education and instructions as listed below were reviewed with Patient via phone  Pt has H&P on 8/21 in Epic  COVID 8/22 8/19/2020 12:51 PM  Olinda Quijano RN

## 2021-06-11 NOTE — TELEPHONE ENCOUNTER
Dr. Adams,     I called and spoke with patient, she went to the ER yesterday for palpitations.  Records unremarkable.  Patient feels well today, she doesn't think she overdid anything or was dehydrated.  Just felt like she heart was jumping all over.    We discussed the healing process post ablation.    I have asked her to keep a log of these episodes and call with concerns.    Further recommendations?  Thanks  Fátima

## 2021-06-11 NOTE — TELEPHONE ENCOUNTER
Pt called stated she had  Ablation procedure  last weak for atrial flutter. Pt stated all day I had rapid heart rate on and off with mild chest discomfort. Pt stated after the procedure all week, I was okay. Pt stated write now I'm feeling the rapid pulse. Writer asked pt if she has pulse oximeter, and she stated yes, writer asked if she can check it while I'm on the line and pt said yes with pulse of 104 now, and noon it was 106. BP at noon was 150/95. Pt was advised to call 911, and pt sated she has someone to take her to the ER. Pt was again told if you get worse please call 911, and pt sated okay, and she will go to [facility] ER.    Manuel Hawkins RN  COVID 19 Nurse Triage Plan/Patient Instructions    Please be aware that novel coronavirus (COVID-19) may be circulating in the community. If you develop symptoms such as fever, cough, or SOB or if you have concerns about the presence of another infection including coronavirus (COVID-19), please contact your health care provider or visit [email or web address].     Disposition/Instructions    Call to EMS/911 recommended. Follow protocol based instructions.pt refused stated she has someone to take her to the ER.    Bring Your Own Device:  Please also bring your smart device(s) (smart phones, tablets, laptops) and their charging cables for your personal use and to communicate with your care team during your visit.      Thank you for taking steps to prevent the spread of this virus.  o Limit your contact with others.  o Wear a simple mask to cover your cough.  o Wash your hands well and often.    Resources    [facility]: About COVID-19: [email or web address]    CDC: What to Do If You're Sick: www.cdc.gov/coronavirus/2019-ncov/about/steps-when-sick.html    CDC: Ending Home Isolation: www.cdc.gov/coronavirus/2019-ncov/hcp/disposition-in-home-patients.html     CDC: Caring for Someone:  www.cdc.gov/coronavirus/2019-ncov/if-you-are-sick/care-for-someone.html     [facility]: Interim Guidance for Hospital Discharge to Home: www.health.[facility].[place]/diseases/coronavirus/hcp/hospdischarge.pdf    [facility] clinical trials (COVID-19 research studies): [email or web address]-clinical-trials     Below are the COVID-19 hotlines at the [place] Department of [facility] ([facility]). Interpreters are available.   o For health questions: Call 349-464-7388 or 1-913.623.5667 (7 a.m. to 7 p.m.)  o For questions about schools and childcare: Call 007-679-3195 or 1-152.932.3315 (7 a.m. to 7 p.m.)       Reason for Disposition    Shock suspected (e.g., cold/pale/clammy skin, too weak to stand, low BP, rapid pulse)    Additional Information    Negative: Passed out (i.e., lost consciousness, collapsed and was not responding)    Protocols used: HEART RATE AND HEARTBEAT [id number]

## 2021-06-11 NOTE — TELEPHONE ENCOUNTER
Called and spoke with patient.  Reviewed Dr. Adams's recommendations.  She agrees to call with issues or concerns or long lasting or frequent episodes of palpitations.  She has our contact information.  TELLO

## 2021-06-11 NOTE — TELEPHONE ENCOUNTER
Who is calling:  Patient  Reason for Call:    Patient is questioning if she is supposed to get the Pneumo 13 or 23 vaccine this year. Patient is questioning what she had last year.  Please reach out to patient and advise.  Date of last appointment with primary care: 8/21/2020  Okay to leave a detailed message: Yes

## 2021-06-11 NOTE — TELEPHONE ENCOUNTER
Patient is s/p Aflutter ablation with DND on 8/25.  Pt lft a voicemail to report an ER visit on 8/30 due to palpitations.  Review of ER records look unremarkable.    Voicemail left for patient to confirm she is feeling well now.  Will forward to DND as needed.  TELLO

## 2021-06-11 NOTE — TELEPHONE ENCOUNTER
Refill Approved    Rx renewed per Medication Renewal Policy. Medication was last renewed on 11/20/9.    Joan Drummond, [facility] Triage/Med Refill 9/21/2020     Requested Prescriptions   Pending Prescriptions Disp Refills     SYNTHROID 75 mcg tablet [Pharmacy Med Name: SYNTHROID 75 MCG TABLET] 90 tablet 1     Sig: TAKE 1 TABLET BY MOUTH ONCE DAILY AT 6AM       Thyroid Hormones Protocol Passed - 9/19/2020 11:42 AM        Passed - Provider visit in past 12 months or next 3 months     Last office visit with prescriber/PCP: 1/15/2020 Sandra Waters MD OR same dept: Visit date not found OR same specialty: 3/2/2020 Sharee Alberto MD  Last physical: 8/21/2020 Last MTM visit: Visit date not found   Next visit within 3 mo: Visit date not found  Next physical within 3 mo: Visit date not found  Prescriber OR PCP: Sandra Waters MD  Last diagnosis associated with med order: 1. Hypothyroidism  - SYNTHROID 75 mcg tablet [Pharmacy Med Name: SYNTHROID 75 MCG TABLET]; TAKE 1 TABLET BY MOUTH ONCE DAILY AT 6AM  Dispense: 90 tablet; Refill: 1    If protocol passes may refill for 12 months if within 3 months of last provider visit (or a total of 15 months).             Passed - TSH on file in past 12 months for patient age 12 & older     TSH   Date Value Ref Range Status   08/21/2020 1.37 0.30 - 5.00 uIU/mL Final

## 2021-06-12 NOTE — TELEPHONE ENCOUNTER
New Appointment Needed  What is the reason for the visit:    Discuss recent ablation and medication changes  Provider Preference: PCP only  How soon do you need to be seen?: within one week  Waitlist offered?: no  Okay to leave a detailed message:  Yes

## 2021-06-12 NOTE — PATIENT INSTRUCTIONS - HE
Claudia Colunga,    It was a pleasure to see you today at the [facility].     My recommendations after this visit include:    I recommend on Saturday, Oct 10 stop metoprolol and sotalol 80 mg 1 tab orally every 12 hours.  12 lead EKG in clinic on Monday October 12.    Please call if atrial fibrillation episodes more frequent .      To followup with me in 3 months.      My contact information:  Stefan Kim CNP  After Hours or Scheduling  [phone]  My Nurse---Luci Meza [phone]

## 2021-06-12 NOTE — TELEPHONE ENCOUNTER
Pt calling to inform she is cancelling EKG today that was to be after a sotalol start,   Pt read the insert and it scared her  Reviewed with Stefan and if pt does not want to take this is her choice then would stay on the metoprolol  Reviewed with pt and she is upset because she had ablation and is now in a fib and wants to know what to expect going forward told pt I would have Stefan review and we will get back to her

## 2021-06-12 NOTE — TELEPHONE ENCOUNTER
Claudia called in today with questions regarding sotalol start.  She picked up the sotalol from the pharmacy and read the handout.  It states that if she is supposed to be hospitalized to start sotalol.  I discussed we hospitalize only when I am concerned about bradycardia or needing to monitor heart rhythms with start of medication.  It [place] common [place] to start sotalol as an outpatient.  She has normal heart rates and blood pressure was actually high in clinic so I did not anticipate any side effects with switching from metoprolol succinate 50 mg orally daily to sotalol 80 mg p.o. twice daily.  She has been in the ER 2 times since her flutter ablation with complaints of palpitations.  When she arrives in the ER she is in sinus tachycardia.  I am suspicious that she is having atrial fib episodes because she reports heart rates in the 130s at home and clearly symptomatic with A. Fib.   I answered multiple questions that she had regarding medication and her ablation.  Claudia tells me that she is very hesitant to start new medications.  She is going to consider sotalol start and is going to message or call me when she is ready to start her sotalol.  2 days after she starts the sotalol, she will need to do twelve-lead EKG for QTC check.  After discussion, I believe she is intending on starting the sotalol, but not ready to commit when she would do that.  She assures me that all her questions have been answered to her satisfaction.

## 2021-06-12 NOTE — TELEPHONE ENCOUNTER
Wellness Screening Tool  Symptom Screening:  Do you have one of the following NEW symptoms:    Fever (subjective or >100.0)?  No    A new cough?  No    Shortness of breath?  No     Chills? No     New loss of taste or smell? No     Generalized body aches? No     New persistent headache? No     New sore throat? No     Nausea, vomiting, or diarrhea?  No    Within the past 2 weeks, have you been exposed to someone with a known positive illness below:    COVID-19 (known or suspected)?  No    Chicken pox?  No    Mealses?  No    Pertussis?  No    Patient notified of visitor policy- They may have one person accompany them to their appointment, but they will need to wear a mask and will be screened upon arrival for symptoms: Yes  Pt informed to wear a mask: Yes  Pt notified if they develop any symptoms listed above, prior to their appointment, they are to call the clinic directly at [phone] for further instructions.  Yes  Patient's appointment status: Patient will be seen in clinic as scheduled on 10/8

## 2021-06-12 NOTE — PROGRESS NOTES
Patient would like to be contacted via the following phone number [phone]      ASSESSMENT: Emergency department follow-up/status post atrial ablation    1. Palpitations  Seen in emergency department for 2 episodes of significant palpitations.  Each time/normal sinus rhythm on EKG.  Presumption of paroxysmal A. fib as this was seen with ablation.  She declined sotalol but would consider this.  Recommendations: We will proceed with cardiac monitor to verify paroxysmal atrial fibrillation if this is indeed occurring.  She will continue on her metoprolol for now.  Currently, feeling well.  Blood pressures and heart rate stable.  Encouraged reduction of caffeine and sweets.  Encourage meditation and lifestyle measures which would reduce anxiety.  He is going to try low-dose fish oil and magnesium.  Of note, she does have some underlying gastrointestinal issues and encourage caution with fish oil  Follow-up with electrophysiology and with me after monitor is complete  - GETACHEW Monitor Hook-Up; Future    2. Paroxysmal atrial fibrillation (H)  As above-had episode of atrial fibrillation that needed to be cardioverted during ablation.  Some presumption that she is having paroxysms of this currently.  Recommendation: Continue with anticoagulation  - GETACHEW Monitor Hook-Up; Future    3. S/P ablation of atrial flutter  As above  - GETACHEW Monitor Hook-Up; Future    4. Type 2 diabetes mellitus without complication, without long-term current use of insulin (H)  Encourage healthy diet and exercise.  Last glycohemoglobin was elevated.  She is a baker.  Encourage cessation of caffeine and sweets    5. Essential hypertension  Blood pressure controlled currently.  It is definitely related to high levels of anxiety.  Today, optimal    Follow-up after monitor    Preventive Health Care:      PLAN:  There are no Patient Instructions on file for this visit.    No follow-ups on file.      CHIEF COMPLAINT:  Chief Complaint   Patient presents with      Follow-up       HISTORY OF PRESENT ILLNESS:  Claudia is a 65 y.o. female contacting the clinic today via phone for discussion of her 2 episodes/visits to the emergency department following her successful cardiac ablation.  She states she is feeling frustrated that she did not get enough education about her specific procedure or the possibility of atrial fibrillation following this event.  Of note, she presented to the emergency department on 2 occasions with palpitations.  Each time, she was found to be in sinus tachycardia.  There was a presumption that perhaps she was having paroxysmal atrial fibrillation.  Laboratory studies from those visits were reviewed and were unremarkable.  She was provided reassurance.  She declined a switch from metoprolol to sotalol-after reading the packaging about sotalol.  She wondered whether this was not being monitored with a hospitalization.  Therefore, she remains on metoprolol.    Currently, she is okay.  She states her blood pressure and heart rate are currently stable.  She is working hard to reduce caffeine.  She is trying to keep her stress levels low but does admit that she is feeling very anxious with regard to the palpitations/pandemic, etc.  We did discuss the fact that remotely, when we were dealing with new onset of hypertension, she was in the ER frequently with anxiety associated with elevated blood pressures.  The discussed and recalled how anxiety has contributed to some of her symptoms in the past.    She is currently retired.  She is as above trying to exercise regularly and remain vigilant with the pandemic.    REVIEW OF SYSTEMS:     All other systems are negative.    PFSH:  Social History     Social History Narrative    She walks her poodle daily for 30-60 minutes. She enjoys going to the [employer] or [employer] to paddle in the pool.     She is single but has a significant other.  She walks her dog daily.  Unfortunately, she is not able to swim or  "workout at the [facility] currently    TOBACCO USE:  Social History     Tobacco Use   Smoking Status Never Smoker   Smokeless Tobacco Never Used       VITALS:  blood pressure 110/70  There were no vitals filed for this visit.  Wt Readings from Last 3 Encounters:   10/08/20 213 lb (96.6 kg)   10/06/20 209 lb (94.8 kg)   08/30/20 214 lb (97.1 kg)       PHYSICAL EXAM:  (observations via Phone)  Her voice sounds slightly anxious.  Vitals are as recorded per patient.      ADDITIONAL HISTORY SUMMARIZED (2): Reviewed emergency department visits as well as electrophysiology notes  CARE EVERYWHERE/ EXTRA INFORMATION (1):   No orders of the defined types were placed in this encounter.    RADIOLOGY TESTS (1):   LABS (1): Viewed labs  CARDIOLOGY/MEDICINE TESTS (1): Reviewed echocardiogram and ablation documents  INDEPENDENT REVIEW (2 each):     Total data points: 5+    Phone Start time: 9:36 AM  Phone End time: 9:56 AM    The visit lasted a total of 20 minutes     CA Intake Time: 5 min    I have reviewed and updated the patient's Past Medical History, Social History, Family History as appropriate.    MEDICATIONS: Reviewed and updated per CA and MD  Current Outpatient Medications   Medication Sig Dispense Refill     apixaban ANTICOAGULANT (ELIQUIS) 5 mg Tab tablet Take 1 tablet (5 mg total) by mouth 2 (two) times a day. 60 tablet 3     metoprolol succinate (TOPROL-XL) 50 MG 24 hr tablet Take 50 mg by mouth daily.       multivitamin capsule Take 1 capsule by mouth daily.       SYNTHROID 75 mcg tablet TAKE 1 TABLET BY MOUTH ONCE DAILY AT 6AM 90 tablet 3     sotaloL (BETAPACE) 80 MG tablet Take 1 tablet (80 mg total) by mouth every 12 (twelve) hours. 60 tablet 3     No current facility-administered medications for this visit.        The patient has been notified of following:     \"This telephone visit will be conducted via a call between you and your physician/provider. We have found that certain health care needs can be provided without " "the need for a physical exam.  This service lets us provide the care you need with a short phone conversation.  If a prescription is necessary we can send it directly to your pharmacy.  If lab work is needed we can place an order for that and you can then stop by our lab to have the test done at a later time.    Telephone visits are billed at different rates depending on your insurance coverage. During this emergency period, for some insurers they may be billed the same as an in-person visit.  Please reach out to your insurance provider with any questions.    If during the course of the call the physician/provider feels a telephone visit is not appropriate, you will not be charged for this service.\"    Patient has given verbal consent to a Telephone visit? Yes    Patient would like to receive their AVS by AVS Preference: Mail a copy.    Yi Candelario LPN       "

## 2021-06-12 NOTE — TELEPHONE ENCOUNTER
New Appointment Needed  What is the reason for the visit:    Same Date/Next Day Appt Request  What is the reason for your visit?:  follow up for ablation  Provider Preference: PCP only  How soon do you need to be seen?:   Friday 10/23 or Next Week  - Patient is currently schedule on 10/20, received a phone call to reschedule as pcp will be out of office. However, there are no openings until 11/17/20. Patient would like to know if she can have her phone appt before November. Please advise and reach out to patient.   Waitlist offered?: No  Okay to leave a detailed message:  Yes

## 2021-06-12 NOTE — TELEPHONE ENCOUNTER
Pt was seen today by Stefan see note and med plan  Pt is wondering if she could take any extra metoprolol today since HR stiil elevated  Pt is very anxious with this  Reviewed with Stefan and pt may take her metoprolol two times a day today and tomorrow

## 2021-06-13 NOTE — PROGRESS NOTES
Preoperative Consultation    Claudia Colunga   62 y.o.  female    Date of visit: 10/17/2017    This is a preoperative consultation requested by Dr. Monica Peres in preparation for colonoscopy on August 21, 2017 at [facility], fax [phone].    Assessment and Plan:  Claudia Colunga was seen in preoperative consultation in preparation for colonoscopy.  This is a low risk surgery and the patient has no increased risk for major cardiac complications based on exam and history and appears to be medically stable for the proposed surgery/procedure.    1. Pre-operative examination for internal medicine  Patient is medically stable for colonoscopy.  She may take her metoprolol and Synthroid with sips of water on the morning of her procedure without event.  - Electrocardiogram Perform and Read  - HM2(CBC w/o Differential)    2. Impaired fasting glucose  6 pound weight loss since last visit.  She is working very hard at diet and exercise.  Will update labs  - Glycosylated Hemoglobin A1c  - Basic Metabolic Panel    3. Acquired hypothyroidism  We will update labs  - Thyroid Stimulating Hormone (TSH)    4. Hypertension  Stable and controlled on current medication    5. Excessive cerumen in both ear canals  We will remove cerumen  - Ear cerumen removal; Future      Patient Active Problem List   Diagnosis     Hypothyroidism     Hypertension     Palpitations     Anxiety     Overflow Incontinence     Skin Disorder     Urinary Frequency     Vitamin D Deficiency     Eosinophilic Gastroenteritis     Abdominal Pain In The Right Upper Belly (RUQ)     Belching       HPI:   Claudia is a delightful 62-year-old female who is asked to have a physical examination prior to her screening colonoscopy which is scheduled later this month.  Of note, she has no chief complaints.  Her history is significant for generalized anxiety which is markedly improved over the past year.  Of note, her history is also positive for hypertension  and palpitations.  These problems have been quiesced sent over the past year.  She is on low-dose metoprolol with good response.  She is otherwise without complaints.    Pertinent anesthesia history is reviewed.  She denies any symptoms of infectious diseases, cough, shortness of breath or any chest pain or palpitations.  Of note, she has had colonoscopies in the past.  She denies any untoward reaction to both local and general anesthetic agents.    Review of systems:  A comprehensive review of systems was performed and was otherwise negative    Current Outpatient Prescriptions   Medication Sig Dispense Refill     aspirin (ASPIRIN LOW DOSE) 81 MG EC tablet Take 81 mg by mouth daily.       cholecalciferol, vitamin D3, 5,000 unit Tab Take 1 tablet by mouth daily.       metoprolol succinate (TOPROL-XL) 25 MG TAKE 1 TABLET TWICE DAILY-AND HALF TAB EXTRA 90 tablet 1     multivitamin capsule Take 1 capsule by mouth daily.       SYNTHROID 75 mcg tablet TAKE ONE TABLET BY MOUTH DAILY AT 6:00AM 90 tablet 3     No current facility-administered medications for this visit.        Allergies   Allergen Reactions     Lisinopril      Penicillins        Recent antiplatelet use: Yes, aspirin  Steroid use in the past year: No    Past difficulty with anesthesia:  no    Personal or family history of difficulty with anesthesia: no    Current cardiopulmonary symptoms: no        Past Surgical History:   Procedure Laterality Date     BREAST BIOPSY Left      RI BIOPSY OF BREAST, INCISIONAL      Description: Biopsy Breast Open;  Recorded: 08/23/2012;     RI BIOPSY OF BREAST, INCISIONAL      Description: Biopsy Breast Open;  Recorded: 10/22/2014;     RI REMOVAL OF OVARIAN CYST(S)      Description: Ovarian Cystectomy;  Recorded: 08/23/2012;     Family History   Problem Relation Age of Onset     Breast cancer Cousin      Social History   Substance Use Topics     Smoking status: Never Smoker     Smokeless tobacco: Not on file     Alcohol use Yes  "       Physical Exam:    General Appearance:   She appears well and in no acute distress    Vitals:    10/17/17 0836   BP: 110/74   Patient Site: Left Arm   Patient Position: Sitting   Cuff Size: Adult Large   Pulse: 72   Weight: 215 lb 12.8 oz (97.9 kg)   Height: 5' 10\" (1.778 m)     Wt Readings from Last 3 Encounters:   10/17/17 215 lb 12.8 oz (97.9 kg)   12/15/16 221 lb (100.2 kg)   12/13/16 221 lb (100.2 kg)       EYES: Eyelids, conjunctiva, and sclera were normal. Pupils were normal. Cornea, iris, and lens were normal bilaterally.  HEAD, EARS, NOSE, MOUTH, AND THROAT: Head and face were normal. Hearing was normal to voice and the ears were normal to external exam. Nose appearance was normal and there was no discharge. Oropharynx was normal.  TMs were normal.  NECK: Neck appearance was normal. There were no neck masses and the thyroid was not enlarged.  RESPIRATORY: Breathing pattern was normal and the chest moved symmetrically.   Lung sounds were equal bilaterally.  CARDIOVASCULAR: Heart rate and rhythm were normal.  S1 and S2 were normal and there were no extra sounds or murmurs. Peripheral pulses in arms and legs were normal.  Jugular venous pressure was normal.  There was no peripheral edema.  GASTROINTESTINAL: The abdomen was normal in contour.  Bowel sounds were present.   Palpation detected no tenderness, mass, or enlarged organs.   MUSCULOSKELETAL: Skeletal configuration was normal and muscle mass was normal for age. Joint appearance was overall normal.  LYMPHATIC: There were no enlarged nodes.  SKIN/HAIR/NAILS: Skin color was normal.  There were no abnormal skin lesions.  Hair and nails were normal.  NEUROLOGIC: The patient was alert and oriented to person, place, time, and circumstance. Speech was normal. Cranial nerves were normal. Motor strength was normal for age. The patient was normally coordinated.  PSYCHIATRIC:  Mood and affect were normal and the patient had normal recent and remote memory. The " patient's judgment and insight were normal.    EKG unchanged from prior  Results for orders placed or performed in visit on 10/17/17   Electrocardiogram Perform and Read   Result Value Ref Range    SYSTOLIC BLOOD PRESSURE  mmHg    DIASTOLIC BLOOD PRESSURE  mmHg    VENTRICULAR RATE 68 BPM    ATRIAL RATE 68 BPM    P-R INTERVAL 176 ms    QRS DURATION 150 ms    Q-T INTERVAL 446 ms    QTC CALCULATION (BEZET) 474 ms    P Axis 11 degrees    R AXIS -34 degrees    T AXIS -4 degrees    MUSE DIAGNOSIS       Normal sinus rhythm  Left axis deviation  Right bundle branch block  Abnormal ECG  When compared with ECG of 27-MAR-2014 14:11,  No significant change was found         Sandra Waters MD  Internal Medicine  [facility]  Contact me at [phone]

## 2021-06-13 NOTE — TELEPHONE ENCOUNTER
Spoke with pt and relayed pcp message.  Pt understanding and agreeable.  Pt declined to schedule an appt at this time.

## 2021-06-13 NOTE — TELEPHONE ENCOUNTER
She may have more extra beats if she reduces her metoprolol.  If she is not light headed or dizzy, I would recommend that she keep the meds the same.  It is very reassurring that no atrial fib was seen on the heart monitor, it is customary to stay the course with the eliquis for now.  Does she have a follow up with cardiology?  She can follow up with me in early December if she would like

## 2021-06-13 NOTE — TELEPHONE ENCOUNTER
Please let Claudia know that her know that there heart monitor was unremarkable.  A few NON worrisome extra beats noted ,but no arrythmia

## 2021-06-13 NOTE — TELEPHONE ENCOUNTER
Spoke with pt and relayed pcp message.  Pt understanding.    But wondering if she does/does not have afib.  Wondering if she needs to still be taking eliquis?    Also wanted to report her BP has been running a bit low, 104/76.  Did report while she was wearing the Candy monitor she cut her metoprolol in half to see how that would affect her BP.  Reports it had no change to her BP.

## 2021-06-14 NOTE — PATIENT INSTRUCTIONS - HE
Claudia Colunga,    It was a pleasure to see you today at the [facility].     My recommendations after this visit include:    Please call if atrial fibrillation episodes more frequent and I discussed I would probably put you on the sotalol.      To followup with me in 6 months.      My contact information:  Stefan Kim, NEIL  After Hours or Scheduling  [phone]  My Nurse---Luci Meza [phone]

## 2021-06-14 NOTE — TELEPHONE ENCOUNTER
Wellness Screening Tool  Symptom Screening:  Do you have one of the following NEW symptoms:    Fever (subjective or >100.0)?  No    A new cough?  No    Shortness of breath?  No     Chills? No     New loss of taste or smell? No     Generalized body aches? No     New persistent headache? No     New sore throat? No     Nausea, vomiting, or diarrhea?  No    Within the past 2 weeks, have you been exposed to someone with a known positive illness below:    COVID-19 (known or suspected)?  No    Chicken pox?  No    Mealses?  No    Pertussis?  No    Patient notified of visitor policy- No visitors are allowed to accompany the patient at this time. Yes  Pt informed to wear a mask: Yes  Pt notified if they develop any symptoms listed above, prior to their appointment, they are to call the clinic directly at [phone] for further instructions.  Yes  Patient's appointment status: Patient will be seen in clinic as scheduled on 1/14

## 2021-06-15 NOTE — PROGRESS NOTES
[facility]    Assessment/Plan:  1. Lymph node enlargement  Left submandibular area-oral cavity clear.  No other lymph nodes involved.  Is mildly tender.  Recommendation: We will proceed with hemogram and ultrasound of the neck.  No treatment at this juncture.    - US Neck Limited; Future  - HM2(CBC w/o Differential)  - Comprehensive Metabolic Panel    2. Paroxysmal atrial fibrillation (H)  Stable since ablation.  Remains on beta-blocker and Eliquis.  Continue the same    3. Type 2 diabetes mellitus without complication, without long-term current use of insulin (H)  Monitoring with lifestyle.  Due for labs  - Comprehensive Metabolic Panel  - Glycosylated Hemoglobin A1c    4. Essential hypertension  Stable on current medication    5. Acquired hypothyroidism  Due for labs  - Thyroid Cascade    6. Vitamin D deficiency  Due for follow-up  - Vitamin D, Total (25-Hydroxy)          Sandra Waters MD    Chief Complaint:  Chief Complaint   Patient presents with     Mass     Left side of neck       History of Present Illness:  Claudia is a 65 y.o. female who is here today for evaluation of a small mass/swelling in the left jawline.  She states she has noticed this for the past few days.  There are no associated symptoms such as teeth pain, respiratory tract infection, fever or chills.  She states she continues to have reflux-but this is not new.  She states she is otherwise well but nervous about the coronavirus.  Her most recent dental exam was in the fall.  She has no teeth pain whatsoever.    She reports that she has not had any further episodes of atrial fib or flutter.  She believes that her heart has quieted down and therefore she does not need the sotalol.  She is on metoprolol and Eliquis.  She states she does bleed quite a bit from the Eliquis.    She requests an update on diabetes and thyroid.  She denies any polyuria or polydipsia.  She does not have any new symptoms of low  thyroid.    Review of Systems:  A comprehensive review of systems was performed and was otherwise negative    PFSH:  Social History: Is single.  She has a significant other.  She is retired.  Social History     Tobacco Use   Smoking Status Never Smoker   Smokeless Tobacco Never Used       Past History:   Past Medical History:   Diagnosis Date     Anxiety      Atrial flutter (H)      Diabetes mellitus, type II (H)      Eosinophilic esophagitis      Essential hypertension      Hypothyroid      Overflow incontinence        Current Outpatient Medications   Medication Sig Dispense Refill     ELIQUIS 5 mg Tab tablet TAKE 1 TABLET BY MOUTH TWICE A DAY 60 tablet 3     metoprolol succinate (TOPROL-XL) 50 MG 24 hr tablet Take 50 mg by mouth daily.       multivitamin capsule Take 1 capsule by mouth daily.       SYNTHROID 75 mcg tablet TAKE 1 TABLET BY MOUTH ONCE DAILY AT 6AM 90 tablet 3     No current facility-administered medications for this visit.        Family History:     Physical Exam:  General Appearance:   He appears well and in no acute distress  Vitals:    02/09/21 1253   BP: 128/82   Patient Site: Left Arm   Patient Position: Sitting   Cuff Size: Adult Large   Pulse: 78   Temp: 98.4  F (36.9  C)   TempSrc: Tympanic   SpO2: 98%   Weight: 208 lb 11.2 oz (94.7 kg)     Wt Readings from Last 3 Encounters:   02/09/21 208 lb 11.2 oz (94.7 kg)   01/14/21 210 lb (95.3 kg)   10/08/20 213 lb (96.6 kg)     Body mass index is 29.11 kg/m .    Oral cavity is clear.  There is a small swelling in the left submandibular area.  Nontender  Remainder of neck exam is negative    Data Review:    Analysis and Summary of Old Records (2): Reviewed EP notes    Records Requested (1):       Other History Summarized (from other people in the room) (2):     Radiology Tests Summarized (XRAY/CT/MRI/DXA) (1): Ordered ultrasound    Labs Reviewed (1): Ordered labs    Medicine Tests Reviewed (EKG/ECHO/COLONOSCOPY/EGD) (1):     Independent Review of EKG  or X-RAY (2):

## 2021-06-15 NOTE — TELEPHONE ENCOUNTER
Lump on jaw line. Sore today.    Left side of jaw, pea size.   Wants to be seen tomorrow.  Can she be worked into schedule?    Patient request call back from team.    Lacey Bravo RN [facility] Triage Nurse Advisor    Reason for Disposition    Patient wants to be seen    Additional Information    Negative: Shock suspected (e.g., cold/pale/clammy skin, too weak to stand, low BP, rapid pulse)    Negative: Similar pain previously and it was from 'heart attack'    Negative: Similar pain previously from 'angina' and not relieved by nitroglycerin    Negative: Difficult to awaken or acting confused (e.g., disoriented, slurred speech)    Negative: Sounds like a life-threatening emergency to the triager    Negative: Followed an injury to neck (e.g., MVA, sports, impact or collision)    Negative: Chest pain    Negative: Lymph node in the neck is swollen or painful to the touch    Negative: Sore throat is the main symptom    Negative: Difficulty breathing or unusual sweating (e.g., sweating without exertion)    Negative: Chest pain lasting longer than 5 minutes    Negative: Stiff neck (can't touch chin to chest) and has headache    Negative: Stiff neck (can't touch chin to chest) and fever    Negative: Weakness of an arm or hand    Negative: Problems with bowel or bladder control    Negative: Patient sounds very sick or weak to the triager    Negative: SEVERE pain (e.g., excruciating, unable to do any normal activities)    Negative: Head is twisting to one side (or ask 'is it turning against your will?')    Negative: Fever > 103 F (39.4 C)    Negative: Fever > 100.0 F (37.8 C) and IVDA (intravenous drug abuse)    Negative: Fever > 100.0 F (37.8 C) and has diabetes mellitus or a weak immune system (e.g., HIV positive, cancer chemotherapy, organ transplant, splenectomy, chronic steroids)    Negative: Numbness in an arm or hand (i.e., loss of sensation)    Negative: Painful rash with multiple small blisters grouped together (i.e.,  dermatomal distribution or 'band' or 'stripe')    Negative: High-risk adult (e.g., history of cancer, HIV, or IV drug abuse)    Protocols used: NECK PAIN OR [id number]

## 2021-06-15 NOTE — TELEPHONE ENCOUNTER
Spoke with pt to get more information.  Reports small pea sized lump has been on her jaw line for several weeks.  Would like pcp to look at it.  Okay with waiting for pcp to return.  Appt scheduled for Tuesday next week.

## 2021-06-15 NOTE — TELEPHONE ENCOUNTER
RN cannot approve Refill Request    RN can NOT refill this medication med is not covered by policy/route to provider. Last office visit: 2/9/2021 Sandra Waters MD Last Physical: 8/21/2020 Last MTM visit: Visit date not found Last visit same specialty: 2/9/2021 Sandra Waters MD.  Next visit within 3 mo: Visit date not found  Next physical within 3 mo: Visit date not found      Vilma Delgado, [facility] Triage/Med Refill 3/7/2021    Requested Prescriptions   Pending Prescriptions Disp Refills     metoprolol succinate (TOPROL-XL) 50 MG 24 hr tablet [Pharmacy Med Name: METOPROLOL SUCC ER 50 MG TAB] 90 tablet 2     Sig: TAKE ONE HALF TABLET ORALLY TWICE DAILY       Beta-Blockers Refill Protocol Passed - 3/7/2021  9:38 AM        Passed - PCP or prescribing provider visit in past 12 months or next 3 months     Last office visit with prescriber/PCP: 2/9/2021 Sandra Waters MD OR same dept: 2/9/2021 Sandra Waters MD OR same specialty: 2/9/2021 Sandra Waters MD  Last physical: 8/21/2020 Last MTM visit: Visit date not found   Next visit within 3 mo: Visit date not found  Next physical within 3 mo: Visit date not found  Prescriber OR PCP: Sandra Waters MD  Last diagnosis associated with med order: 1. Typical atrial flutter (H)  - ELIQUIS 5 mg Tab tablet [Pharmacy Med Name: ELIQUIS 5 MG TABLET]; TAKE 1 TABLET BY MOUTH TWICE A DAY  Dispense: 60 tablet; Refill: 3    If protocol passes may refill for 12 months if within 3 months of last provider visit (or a total of 15 months).             Passed - Blood pressure filed in past 12 months     BP Readings from Last 1 Encounters:   02/09/21 128/82                ELIQUIS 5 mg Tab tablet [Pharmacy Med Name: ELIQUIS 5 MG TABLET] 60 tablet 3     Sig: TAKE 1 TABLET BY MOUTH TWICE A DAY       Apixaban/Rivaroxaban/Dabigatran Refill Protocol Failed - 3/7/2021  9:38 AM        Failed - Route to appropriate pool/provider     Last Anticoagulation Summary:            Passed - Renal function test in last year     Creatinine   Date Value Ref Range Status   02/09/2021 0.77 0.60 - 1.10 mg/dL Final             Passed - PCP or prescribing provider visit in past 12 months       Last office visit with prescriber/PCP: 2/9/2021 Sandra Waters MD OR same dept: 2/9/2021 Sandra Waters MD OR same specialty: 2/9/2021 Sandra Waters MD  Last physical: 8/21/2020 Last MTM visit: Visit date not found   Next visit within 3 mo: Visit date not found  Next physical within 3 mo: Visit date not found  Prescriber OR PCP: Sandra Waters MD  Last diagnosis associated with med order: 1. Typical atrial flutter (H)  - ELIQUIS 5 mg Tab tablet [Pharmacy Med Name: ELIQUIS 5 MG TABLET]; TAKE 1 TABLET BY MOUTH TWICE A DAY  Dispense: 60 tablet; Refill: 3    If protocol passes may refill for 12 months if within 3 months of last provider visit (or a total of 15 months).

## 2021-06-16 PROBLEM — I48.0 PAROXYSMAL ATRIAL FIBRILLATION (H): Status: ACTIVE | Noted: 2020-10-08

## 2021-06-16 PROBLEM — I48.3 TYPICAL ATRIAL FLUTTER (H): Status: ACTIVE | Noted: 2020-06-23

## 2021-06-16 PROBLEM — Z53.20 STATIN DECLINED: Chronic | Status: ACTIVE | Noted: 2020-01-20

## 2021-06-16 PROBLEM — E11.9 TYPE 2 DIABETES MELLITUS WITHOUT COMPLICATION, WITHOUT LONG-TERM CURRENT USE OF INSULIN (H): Chronic | Status: ACTIVE | Noted: 2019-10-09

## 2021-06-17 NOTE — TELEPHONE ENCOUNTER
Telephone Encounter by Olinda Avina RN at 8/31/2020  4:02 PM     Author: Olinda Avina RN Service: -- Author Type: Registered Nurse    Filed: 8/31/2020  4:02 PM Encounter Date: 8/31/2020 Status: Signed    : Olinda Avina RN (Registered Nurse)       Vick Adams MD Westlund, Jessica T, RN    Caller: Unspecified (Today, 10:49 AM)               She could be having paroxysmal atrial fibrillation.  If episodes are brief and self-limited, no further work-up is indicated at this time.  If episodes are moderately frequent or long-lasting and troubling to her would recommend a 14-day event monitor.  Okay to follow-up with me as scheduled.  dd

## 2021-06-18 NOTE — PROGRESS NOTES
[facility] Follow Up Note    Assessment/Plan:  1. Hypertension  Under excellent control on current medications.  Recommendation: Continue the same    2. Acquired hypothyroidism  We will update labs  - Thyroid Stimulating Hormone (TSH); Future    3.  Generalized anxiety  Of note, this is in remission with reduction of work hours, beta-blocker, exercise.  Recommendation: Continue the same    4. Impaired fasting glucose  She is working hard to reduce processed food and simple sugars.  Will update labs  - HM2(CBC w/o Differential)  - Lipid Profile  - Comprehensive Metabolic Panel  - Glycosylated Hemoglobin A1c    5. Bilateral impacted cerumen  With slight diminished hearing.  - Ear cerumen removal; Future      For health maintenance, shingles vaccine is ordered    Sandra Waters MD    Chief Complaint:  Chief Complaint   Patient presents with     Follow-up       History of Present Illness:  Claudia is a 62 y.o. female who is here today for follow-up with regard to her usual medications.  Of note, her history is significant for generalized anxiety disorder which has been with improved control lately.  Additionally, she has hypertension.  Today, she reports really no chief complaint.  She has been working part-time and is very happy in her job.  She is very sad today, however, because she needs to put her elderly dog to sleep this afternoon.  The dog has been instrumental in helping her manage her anxiety.  She does request a note to allow her to have a dog for anxiety in her town home.    With regard to blood sugars, she states she has made some attempts at reducing sugars in her diet.  Her weight, however is unchanged.    She denies any polyuria polydipsia    Review of Systems:  A comprehensive review of systems was performed and was otherwise negative    PFSH:  Social History: She is single.  She has a significant other  History   Smoking Status     Never Smoker   Smokeless Tobacco     Never Used        Past History: As noted in the snapshot  Current Outpatient Prescriptions   Medication Sig Dispense Refill     aspirin (ASPIRIN LOW DOSE) 81 MG EC tablet Take 81 mg by mouth daily.       cholecalciferol, vitamin D3, 5,000 unit Tab Take 1 tablet by mouth daily.       metoprolol succinate (TOPROL-XL) 50 MG 24 hr tablet Take 0.5 tablets (25 mg total) by mouth 2 (two) times a day. 45 tablet 3     multivitamin capsule Take 1 capsule by mouth daily.       SYNTHROID 75 mcg tablet TAKE ONE TABLET BY MOUTH DAILY AT 6:00AM 90 tablet 3     No current facility-administered medications for this visit.        Family History: Noncontributory today    Physical Exam:  General Appearance:   She looks fantastic.  She is pleasant and well-appearing and in no acute distress  Vitals:    06/01/18 0841   BP: 108/76   Patient Site: Left Arm   Patient Position: Sitting   Cuff Size: Adult Regular   Pulse: 78   Weight: 216 lb (98 kg)     Wt Readings from Last 3 Encounters:   06/01/18 216 lb (98 kg)   10/17/17 215 lb 12.8 oz (97.9 kg)   12/15/16 221 lb (100.2 kg)     Body mass index is 30.99 kg/(m^2).  EYES: Eyelids, conjunctiva, and sclera were normal. Pupils were normal. Cornea, iris, and lens were normal bilaterally.  HEAD, EARS, NOSE, MOUTH, AND THROAT: Head and face were normal. Hearing was normal to voice and the ears were normal to external exam. Nose appearance was normal and there was no discharge. Oropharynx was normal.  TMs were obstructed by cerumen.  NECK: Neck appearance was normal. There were no neck masses and the thyroid was not enlarged.  RESPIRATORY: Breathing pattern was normal and the chest moved symmetrically.   Lung sounds were equal bilaterally.  CARDIOVASCULAR: Heart rate and rhythm were normal.  S1 and S2 were normal and there were no extra sounds or murmurs. Peripheral pulses in arms and legs were normal.  Jugular venous pressure was normal.  There was no peripheral edema.  GASTROINTESTINAL: The abdomen was  normal in contour.  Bowel sounds were present.   Palpation detected no tenderness, mass, or enlarged organs.   MUSCULOSKELETAL: Skeletal configuration was normal and muscle mass was normal for age. Joint appearance was overall normal.  LYMPHATIC: There were no enlarged nodes.  SKIN/HAIR/NAILS: Skin color was normal.  There were no abnormal skin lesions.  Hair and nails were normal.  NEUROLOGIC: The patient was alert and oriented to person, place, time, and circumstance. Speech was normal. Cranial nerves were normal. Motor strength was normal for age. The patient was normally coordinated.  PSYCHIATRIC:  Mood and affect were normal and the patient had normal recent and remote memory. The patient's judgment and insight were normal.

## 2021-06-18 NOTE — LETTER
Letter by Sandra Waters MD at      Author: Sandra Waters MD Service: -- Author Type: --    Filed:  Encounter Date: 3/13/2019 Status: (Other)       Claudia Colunga  [address] [place]  [facility] [address]             March 13, 2019         Dear Ms. Colunga,    Below are the results from your recent visit:    Resulted Orders   Glycosylated Hemoglobin A1c   Result Value Ref Range    Hemoglobin A1c 6.2 (H) 3.5 - 6.0 %   Basic Metabolic Panel   Result Value Ref Range    Sodium 140 136 - 145 mmol/L    Potassium 4.4 3.5 - 5.0 mmol/L    Chloride 106 98 - 107 mmol/L    CO2 27 22 - 31 mmol/L    Anion Gap, Calculation 7 5 - 18 mmol/L    Glucose 109 70 - 125 mg/dL    Calcium 9.3 8.5 - 10.5 mg/dL    BUN 14 8 - 22 mg/dL    Creatinine 0.80 0.60 - 1.10 mg/dL    GFR MDRD Af Amer >60 >60 mL/min/1.73m2    GFR MDRD Non Af Amer >60 >60 mL/min/1.73m2    Narrative    Fasting Glucose reference range is 70-99 mg/dL per  American Diabetes Association (ADA) guidelines.   Microalbumin, Random Urine   Result Value Ref Range    Microalbumin, Random Urine 1.53 0.00 - 1.99 mg/dL    Creatinine, Urine 185.6 mg/dL    Microalbumin/Creatinine Ratio Random Urine 8.2 <=19.9 mg/g    Narrative    Microalbumin, Random Urine  <2.0 mg/dL . . . . . . . . Normal  3.0-30.0 mg/dL . . . . . . Microalbuminuria  >30.0 mg/dL . . . . . .  . Clinical Proteinuria    Microalbumin/Creatinine Ratio, Random Urine  <20 mg/g . . . . .. . . . Normal   mg/g . . . . . . . Microalbuminuria  >300 mg/g . . . . . . . . Clinical Proteinuria         Claudia, it was nice to see how well you have done with regard to your diabetes.  Please continue to be very vigilant.  Otherwise, we may need its to start medications. [place]!!    Please call with questions or contact us using [employer].    Sincerely,        Electronically signed by Sandra Waters MD

## 2021-06-19 NOTE — LETTER
Letter by Sandra Waters MD at      Author: Sandra Waters MD Service: -- Author Type: --    Filed:  Encounter Date: 8/19/2019 Status: (Other)       Claudia Colunga  [address]  [place] [facility] [address]    August 19, 2019    Dear Claudia    In reviewing your records, we have determined a gap in your preventive services. Based on your age and health history, we recommend the follow service.     ? Immunization  ? Diabetic check  ? Lab work  ? Med check    If you have had the service elsewhere, please contact us so we can update our records. Please let us know if you have transferred your care to another clinic.    Please call [phone] to schedule this appointment.    We believe that a strong preventive care program, including regular physicals and follow-up care is an important part of a healthy lifestyle and we are committed to helping you maintain your health.    Thank you for choosing us as your health care provider.    Sincerely,   [facility]  [address] [facility] [address]  Phone Number:  [phone]

## 2021-06-19 NOTE — LETTER
Letter by Sandra Waters MD at      Author: Sandra Wtaers MD Service: -- Author Type: --    Filed:  Encounter Date: 10/9/2019 Status: Signed         Claudia Colunga  [address] [place]  [facility] [address]             October 9, 2019         Dear Ms. Colunga,    Below are the results from your recent visit:    Resulted Orders   Glycosylated Hemoglobin A1c   Result Value Ref Range    Hemoglobin A1c 6.6 (H) 3.5 - 6.0 %   Basic Metabolic Panel   Result Value Ref Range    Sodium 142 136 - 145 mmol/L    Potassium 4.1 3.5 - 5.0 mmol/L    Chloride 108 (H) 98 - 107 mmol/L    CO2 24 22 - 31 mmol/L    Anion Gap, Calculation 10 5 - 18 mmol/L    Glucose 131 (H) 70 - 125 mg/dL    Calcium 9.3 8.5 - 10.5 mg/dL    BUN 12 8 - 22 mg/dL    Creatinine 0.79 0.60 - 1.10 mg/dL    GFR MDRD Af Amer >60 >60 mL/min/1.73m2    GFR MDRD Non Af Amer >60 >60 mL/min/1.73m2    Narrative    Fasting Glucose reference range is 70-99 mg/dL per  American Diabetes Association (ADA) guidelines.   Thyroid Cascade   Result Value Ref Range    TSH 2.03 0.30 - 5.00 uIU/mL   Lipid Cascade FASTING   Result Value Ref Range    Cholesterol 164 <=199 mg/dL    Triglycerides 97 <=149 mg/dL    HDL Cholesterol 48 (L) >=50 mg/dL    LDL Calculated 97 <=129 mg/dL       Claudia, some good news!  You have lowered your LDL cholesterol to under 100.  That is our goal with your diabetes.    Please call with questions or contact us using [employer].    Sincerely,        Electronically signed by Sandra Waters MD

## 2021-06-19 NOTE — LETTER
Letter by Sandra Waters MD at      Author: Sandra Waters MD Service: -- Author Type: --    Filed:  Encounter Date: 8/19/2019 Status: (Other)       Claudia Colunga  [address] [facility] [address]    August 19, 2019    Dear Claudia    In reviewing your records, we have determined a gap in your preventive services. Based on your age and health history, we recommend the follow service.     ? General Physical  ? Physical with a Pap Smear  ? Colon cancer screening  ? Mammogram  ? Immunization  ? Diabetic check  ? Blood pressure/cardiovascular check  ? Asthma check  ? Cholesterol test  ? Lab work  ? Med check    If you have had the service elsewhere, please contact us so we can update our records. Please let us know if you have transferred your care to another clinic.    Please call [phone] to schedule this appointment.    We believe that a strong preventive care program, including regular physicals and follow-up care is an important part of a healthy lifestyle and we are committed to helping you maintain your health.    Thank you for choosing us as your health care provider.    Sincerely,   [facility] INTER[facility] MEDICINE  [address] [address]  Phone Number:  [phone]

## 2021-06-20 NOTE — LETTER
Letter by Sandra Waters MD at      Author: Sandra Waters MD Service: -- Author Type: --    Filed:  Encounter Date: 1/16/2020 Status: Signed         Claudia Colunga  [address] [place]  [facility] [address]             January 16, 2020         Dear Ms. Colunga,    Below are the results from your recent visit:    Resulted Orders   Glycosylated Hemoglobin A1c   Result Value Ref Range    Hemoglobin A1c 6.4 (H) 3.5 - 6.0 %   Microalbumin, Random Urine   Result Value Ref Range    Microalbumin, Random Urine 2.35 (H) 0.00 - 1.99 mg/dL    Creatinine, Urine 237.0 mg/dL    Microalbumin/Creatinine Ratio Random Urine 9.9 <=19.9 mg/g    Narrative    Microalbumin, Random Urine  <2.0 mg/dL . . . . . . . . Normal  3.0-30.0 mg/dL . . . . . . Microalbuminuria  >30.0 mg/dL . . . . . .  . Clinical Proteinuria    Microalbumin/Creatinine Ratio, Random Urine  <20 mg/g . . . . .. . . . Normal   mg/g . . . . . . . Microalbuminuria  >300 mg/g . . . . . . . . Clinical Proteinuria       Basic Metabolic Panel   Result Value Ref Range    Sodium 142 136 - 145 mmol/L    Potassium 4.3 3.5 - 5.0 mmol/L    Chloride 107 98 - 107 mmol/L    CO2 27 22 - 31 mmol/L    Anion Gap, Calculation 8 5 - 18 mmol/L    Glucose 120 70 - 125 mg/dL    Calcium 9.1 8.5 - 10.5 mg/dL    BUN 12 8 - 22 mg/dL    Creatinine 0.75 0.60 - 1.10 mg/dL    GFR MDRD Af Amer >60 >60 mL/min/1.73m2    GFR MDRD Non Af Amer >60 >60 mL/min/1.73m2    Narrative    Fasting Glucose reference range is 70-99 mg/dL per  American Diabetes Association (ADA) guidelines.   Vitamin D, Total (25-Hydroxy)   Result Value Ref Range    Vitamin D, Total (25-Hydroxy) 38.8 30.0 - 80.0 ng/mL    Narrative    Deficiency <10.0 ng/mL  Insufficiency 10.0-29.9 ng/mL  Sufficiency 30.0-80.0 ng/mL  Toxicity (possible) >100.0 ng/mL       Claudia you are spilling protein from your kidneys from your diabetes.  This further indicates the need for aggressive compliance with diabetic management.  The ADA recommends  all diabetics be on a statin medicine to prevent damage to the blood vessels that causer heart attacks in diabetics.  When your scope is done, we should continue to work on optimizing your situation.    Please call with questions or contact us using [employer].    Sincerely,        Electronically signed by Sandra Waters MD

## 2021-06-20 NOTE — LETTER
Letter by Sandra Waters MD at      Author: Sandra Waters MD Service: -- Author Type: --    Filed:  Encounter Date: 8/21/2020 Status: (Other)         Claudia Colunga  [address]  [facility] [address]             August 21, 2020         Dear Ms. Colunga,    Below are the results from your recent visit:    Resulted Orders   HM2(CBC w/o Differential)   Result Value Ref Range    WBC 5.9 4.0 - 11.0 thou/uL    RBC 4.41 3.80 - 5.40 mill/uL    Hemoglobin 14.0 12.0 - 16.0 g/dL    Hematocrit 42.2 35.0 - 47.0 %    MCV 96 80 - 100 fL    MCH 31.8 27.0 - 34.0 pg    MCHC 33.2 32.0 - 36.0 g/dL    RDW 11.4 11.0 - 14.5 %    Platelets 299 140 - 440 thou/uL    MPV 7.4 7.0 - 10.0 fL   Basic Metabolic Panel   Result Value Ref Range    Sodium 141 136 - 145 mmol/L    Potassium 4.3 3.5 - 5.0 mmol/L    Chloride 106 98 - 107 mmol/L    CO2 24 22 - 31 mmol/L    Anion Gap, Calculation 11 5 - 18 mmol/L    Glucose 114 70 - 125 mg/dL    Calcium 8.9 8.5 - 10.5 mg/dL    BUN 11 8 - 22 mg/dL    Creatinine 0.76 0.60 - 1.10 mg/dL    GFR MDRD Af Amer >60 >60 mL/min/1.73m2    GFR MDRD Non Af Amer >60 >60 mL/min/1.73m2    Narrative    Fasting Glucose reference range is 70-99 mg/dL per  American Diabetes Association (ADA) guidelines.   Glycosylated Hemoglobin A1c   Result Value Ref Range    Hemoglobin A1c 6.5 (H) <=5.6 %      Comment:      Normal <5.7% Prediabete 5.7-6.4% Diabletes 6.5% or higher - adopted from ADA consensus guidelines   Thyroid Cascade   Result Value Ref Range    TSH 1.37 0.30 - 5.00 uIU/mL       Claudia, your lab work is stable for surgery.  As you can see, your diabetes is a bit up.  It will be exceedingly important for you to be compliant with the diabetic diet i.e. reducing starches and sugars.  We will discuss this after you have completed your ablation procedure.    Good luck.  Please let me know if you need anything!    Please call with questions or contact us using [employer].    Sincerely,        Electronically signed by  Sandra Waters MD

## 2021-06-20 NOTE — LETTER
Letter by Fozia Herbert RN at      Author: Fozia Herbert RN Service: -- Author Type: --    Filed:  Encounter Date: 6/24/2020 Status: (Other)       6/24/2020        Claudia Colunga  [address] [place]  [facility] [address]    This letter provides a written record that you were tested for COVID-19 on 6/23/20.     Your result was negative. This means that we didnt find the virus that causes COVID-19 in your sample. A test may show negative when you do actually have the virus. This can happen when the virus is in the early stages of infection, before you feel illness symptoms.    If you have symptoms   Stay home and away from others (self-isolate) until you meet ALL of the guidelines below:    Youve had no fever--and no medicine that reduces fever--for 3 full days (72 hours). And ?    Your other symptoms have gotten better. For example, your cough or breathing has improved. And?    At least 10 days have passed since your symptoms started.    During this time:    Stay home. Dont go to work, school or anywhere else.     Stay in your own room, including for meals. Use your own bathroom if you can.    Stay away from others in your home. No hugging, kissing or shaking hands. No visitors.    Clean high touch surfaces often (doorknobs, counters, handles, etc.). Use a household cleaning spray or wipes. You can find a full list on the EPA website at www.epa.gov/pesticide-registration/list-n-disinfectants-use-against-sars-cov-2.    Cover your mouth and nose with a mask, tissue or washcloth to avoid spreading germs.    Wash your hands and face often with soap and water.    Going back to work  Check with your employer for any guidelines to follow for going back to work.    Employers: This document serves as formal notice that your employee tested negative for COVID-19, as of the testing date shown above.

## 2021-06-20 NOTE — LETTER
Letter by Sharee Alberto MD at      Author: Sharee Alberto MD Service: -- Author Type: --    Filed:  Encounter Date: 3/6/2020 Status: (Other)         Claudia Colunga  [address]  [place]  [facility] [address]             March 6, 2020         Dear Ms. Colunga,    Below are the results from your recent visit:    Celiac screen is negative.  Lyme screen is negative.  Autoimmune markers are not elevated.    If rash is not improved, see dermatologist.    Resulted Orders   Celiac(Gluten)Antibody Panel   Result Value Ref Range    Gliadin IgA 3.7 0.0-<7.0 U/mL    Gliadin IgG 2.3 0.0-<7.0 U/mL    Tissue Transglutaminase IgG AB <0.6 0.0-<7.0 U/mL    Tissue Transglutaminase IgA AB 0.3 0.0-<7.0 U/mL    Immunoglobulin A 142 65 - 400 mg/dL    Narrative      < 7 U/mL = Negative    7-10 U/mL = Equivocal    > 10 U/mL = Positive    Positive results for the tTG and/or gliadin antibodies indicate possible celiac disease and a small intestinal biopsy my be indicated. Antibody levels decrease in patients on gluten-free diets; therefore, negative results do not exclude celiac disease. Total serum IgA is measured to identify selective IgA deficiency, which is present in up to 10% of celiac disease patients. Such patients would have negative results on IgA assays, but may have positive results on IgG antibody assays.   Antinuclear Antibody (CASSIUS) Cascade   Result Value Ref Range    CASSIUS Screen Cascade 0.3 <=2.9 U    Narrative    <1.0 negative  1.1-2.9 weakly positive  3.0-5.9 positive ( reflex)  > or=6.0 strongly positive   Anti-Neutrophil Cytoplasmic Joceline, IgG (ANCA IFA)   Result Value Ref Range    Anti-Neutrophil Cytoplasmic Ab, IgG <1:20 <1:20      Comment:      The ANCA IFA is <1:20; therefore, no further testing will be   performed.  INTERPRETIVE INFORMATION: Anti-Neutrophil Cyto Ab, IgG    Neutrophil Cytoplasmic Antibodies (C-ANCA = granular cytoplasmic   staining, P-ANCA = perinuclear staining) are found in the serum of   over 90  percent of patients with certain necrotizing systemic   vasculitides, and usually in less than 5 percent of patients with   collagen vascular disease or arthritis.  Performed by [facility],  [address] [place] [address] [phone]  [email or web address], Carter Goyal MD, Lab. Director   Lyme Antibody Cascade   Result Value Ref Range    Lyme Antibody Cascade 0.03 <0.90 Index Value    Narrative    Interpretation of Lyme Disease Total Antibody (IgG/IgM)  <0.90 Test Value=Negative  No detectable antibodies to B. burgdorferi. Patients  in early stages of infection may not produce  detectable levels of antibody. Antibiotic therapy  in early disease may prevent antibody production  from reaching detectable levels. Patients with  clinical history and/or symptoms suggestive of Lyme  disease but with negative test results should be  retested in 2-4 weeks.  0.90-<1.10 Test Value=Borderline  Suggests the presence of antibodies to B.  burgdorferi. Recommend repeat collection in 2-4  weeks.  >=1.10 Test Value=Positive  Indicates the presence of antibodies to B.  burgdorferi. False positive results can occur with  sera from syphilis patients. Cross-reactivity may  occur with relapsing fever, [place] Spotted  fever, other spirochetal diseases, erythematosus,  EBV infection, or CMV infection. Clinical symptoms,  epidemiology of the case and other laboratory tests  should allow for distinction of these conditions from  Lyme disease.       Please call with questions or contact us using [employer].    Sincerely,        Electronically signed by Sharee Alberto MD

## 2021-06-20 NOTE — LETTER
Letter by Olinda Avina RN at      Author: Olinda Avina RN Service: -- Author Type: --    Filed:  Encounter Date: 7/14/2020 Status: (Other)       Claudia Colunga  [address] [place]  [facility] [address]                          Dear Claudia Colunga,    Important Information for Your Procedure    You are having a Ablation Procedure:    Your procedure is scheduled for Tuesday 8/25    Please arrive at 6:00 am for registration and preporation for your procedure.   Getting Ready for Your Procedure:    You will need to contact your primary doctor Sandra Waters MD ([phone]), and schedule a pre-operative history within 30 days of your procedure.    You will need to bring a current list of your medications with you for our admissions process the day of your procedure, please do not bring any of your own medications  with you to the hospital    The hospital cannot store your valuables, so please leave them at home    You will need to take off your jewelry prior to your procedure, we advise leaving your jewelry at home, as we cannot store your valuables    Please shower the morning of your procedure, or the night before    This is a same day procedure, in which you can expect to go home the same day. In any unforseen circumstances this plan can change, but is unlikely.    Please make arrangements for transportation home, as you will not be able to drive following your procedure  The Night Prior to Your Procedure:    Please do not have anything to eat or drink after Midnight (12:00am) prior to your procedure    It is important to stay well hydrated, and consume balanced meals the day prior to your procedure  Arriving for Your Procedure:    Please arrive at [facility], located at: [address] [place], [place] [id number]     Due to our current COVID 19 restriction,  is no longer available to park your car. We apologize for this unconvinced, we are taking all steps necessary to keep patients  and staff safe during this time    If you park in the ramp located on [address], take the elevator to level one. Enter the doors to the atrium/lobby area and check in at the main reception desk.     Please check in at the main reception desk in the lobby    You will be assisted to Card[facility] on the third floor.  Going Home:    The physician and/or nurse will review any restrictions, follow-up requirements, and medication instructions prior to going home from the hospital in detail    Listed below are the restrictions that you can expect after your procedure:  o For 3 days after your procedure you will have the following restrictions:    No driving    Avoid hard work or tiring activities    No aggressive exercise    No yard work such as raking, mowing the lawn, shoveling snow or snow plowing    No jogging, biking, or sexual activity    No heaving lifting greater than 10 pounds  o You may shower the day after your procedure  o You may use a hot tub or swim 5 days after your procedure  Clinic Contact Information:    You can contact our main clinic line at any time with questions, concerns, or if you need further information: [facility] [phone]    If you have further questions in regards to the scheduling of you procedure, please contact [facility] at [phone]    Medication prior to your procedure:      PLEASE HOLD YOUR METOPROLOL THE NIGHT BEFORE AND THE MORNING OF THE PROCEDURE and Please take your morning medications the day of your procedure with sips of water, except any multivitamins or supplements.    Continue taking you anticoagulation medication eliquis as prescribed, we are not going to stop your anticoagulation prior to your procedure and it is important for you to remain on this medication., You may not be able to have this procedure if you skip a dose of your eliquis within 30 days of your procedure. We encourage you to make sure you  are taking this medication without skipping any doses, if you happen to miss a dose prior to your procedure please contact us to make further arrangements. and If you have any questions regarding your medication prior to your procedure please contact me at the number listed below.        Sincerely,  Olinda Avina RN  Please call with any questions or concerns regarding the procedure at : [phone]              Information on Supraventricular Tachycardia Ablations    Your Hearts Electrical System   Electrical signals activate the heart muscle which pumps the heart. The heart has a special system that creates and sends electrical signals. The 2 upper chambers (atria) are the first to be activated and squeeze the blood to the 2 lower chambers (ventricles). The electrical signal passes through the connector in the middle of the heart, activating the 2 lower chambers (ventricles), which then pump blood to the lungs and body.            Understanding Supraventricular Tachycardia  Supraventricular tachycardia (SVT) is an abnormally rapid heart rhythm from the upper chambers of the heart, the atria.  This rhythm can be quite fast but is not life-threatening.  It may cause feelings of rapid pounding in the chest, lightheadedness, chest discomfort, shortness of breath and rarely fainting.  Episodes vary in frequency and duration and sometimes require a visit to the Emergency Department for medication to stop the rapid beating.    Treatment of Supraventricular Tachycardia  The main goal of treatment of SVT is relief of symptoms by preventing or minimizing episodes of rapid heat beating.  Medications such as beta blockers may be effective but often need to be taken on a regular basis indefinitely and may be associated with side effects such as fatigue.  A catheter ablation is a procedure that permanently affects the extra pathways responsible for the abnormal circuits causing rapid heart beating.  The ablation  procedure typically lasts about 3 hours done on an outpatient basis with over 98% likelihood of never having the rapid heart beating again or having to take medicines for rapid heart beating.      Supraventricular Tachycardia that may be treated with catheter ablation:               What is Catheter Ablation?             A Catheter Ablation is a procedure that treats certain types of abnormal heart rhythms (arrhythmia). There are several components to the procedure, but the final purpose is target and destroy (ablate) small areas of your heart muscle that are causing the arrhythmia.     Why is an Ablations Important?  A catheter ablation is an effective way to treat some types of abnormal heart rhythms. An ablation is a relatively low risk procedure that may permanently cure your abnormal heart rhythm. An ablation can help avoid lifetime of medications and give patients the ability to return back to their normal activity and live an active life.    Why is Catheter Ablation Done?  Sometimes, the hearts electrical system does not work properly.  This can cause abnormal heart rhythms, called arrhythmias.  During an arrhythmia, the heart may beat too fast, too slowly, or irregularly.  Your doctor has recommended catheter ablation to treat a rapid (fast) heart rhythm, or tachycardia.      How Catheter Ablation Is Done    Catheter ablation uses thin, flexible wires called electrode catheters to find and destroy (ablate) problem cells. Heres how the procedure is done:        The catheters allow an ECG to be obtained from the inside of the heart so the type of abnormal pathways can be identified. The focus of your ablation is the left and/or right atrium of your heart. An ablation catheter is then moved into this area of the abnormal pathways to freeze or heat (ablate) the muscle fibers causing the abnormal heart rhythm.   If AV roberto reentry is found (Figure 1) a freezing catheter is used at the X. Freezing can be stopped  if the catheter is too close to the normal connection (the AV node) without permanent damage.  If AV reentry is found (Figure 2) a heating catheter crosses the septum (S) to burn the accessory pathway (AP).  Blood thinners are used to prevent blood clots and stroke with the catheter on this side of the heart.  About 20 minutes after the ablation, attempts are made to restart the rapid heart beating with pacing.  More ablation is done if SVT can still be started.  If SVT cannot be started the procedure is finished and there is less than 2% chance it will ever recur.       Your Experience during Catheter Ablation  In most cases, catheter ablations are done in an electrophysiology (EP) lab. Depending on your arrhythmia it often takes 2-6 hours.     The procedural area: You will be transferred back to the procedure room once you have been appropriately prepped by the nursing staff and you are ready for your ablation.     Sedation: Youll receive medication to prevent pain and also a medication to help you relax or sedate you during the procedure.     Inserting the catheters: You will typically have one located near your shoulder vein, in addition 2-3 in your left and right groin. Catheters are inserted through these punctures and guided to the heart with the help of x-ray monitors.    Finishing up: When the procedure is finished, the catheters are taken out of your body. Pressure is applied to the puncture sites to help them close. Youre then taken to a recovery room to rest.      Risks and Complications  The risks of catheter ablation are fairly low compared to the benefits you receive. Possible risks and complications include:    Bleeding or bruising    Blood clots    A slow heart rhythm (requiring a permanent pacemaker)    Perforation of the heart muscle, blood vessel, or lung (may require an emergency procedure)    Damage to a heart valve (rare)    Stroke or heart attack, also known as acute myocardial infarction, or  AMI (rare)  Death (extremely rare)  Before your Catheter Ablation  Before your catheter ablation, you will meet with the electrophysiologist (specially trained heart doctor) who will do the procedure. The provider or a registered nurse will provide you with detailed instructions on how to prepare for this procedure, some of these instructions are listed below.    You will likely be told to stop or change your heart rhythm medications for a period of time before your ablation.     You may have testing done several days prior to your ablation or the morning of your ablation, such as an ECG, x-ray, blood tests, or echocardiogram.     You will not be allowed to eat or drink 8 hours before your ablation. You will be given further instructions by your physician or a registered nurse regarding the medication you will take the morning of your procedure.    You will need to arrange to have a  home from the hospital; you will not be permitted to drive after your procedure due to the sedation that you receive.     You are allowed to bring personal items and clothing to the hospital, but please refrain from bringing any valuables as the hospital is not responsible for any lost or stolen items.    You will need to bring a list of the names and dosages of the medication you are taking to the hospital.    It is important to mention to your doctor or registered nurse if you have any allergies, reactions to anesthesia, or have had history of bleeding problems.  Arriving at the Hospital the morning of your Catheter Ablation  You will need to check in at the 1st floor entrance at the [facility] the morning of your ablation, you will then be escorted to the 3rd floor where they will prepare you for your ablation and where your ablation will be performed.    When you arrive on the floor the doctor or registered nurse will meet with you prior to your ablation, this is a good time to ask questions and  address any concerns you may have. You will then be asked to sign the consent form for your ablation, if this has not already been done.    The nursing staff will begin to prepare you for your procedure:    A nurse will shave and cleanse the area where the ablation catheters will be placed. These areas are most commonly the left and right groin sites (the fold between your thigh and abdomen), and in some cases the chest, arm, and neck. This is done to reduce the risk of infection.      The nursing staff will start an intravenous (IV) line into a vein in your arm, which allows the staff to give you medication and sedatives to help you relax prior and during your ablation.    In some cases the nursing staff will need to place a catheter that will drain urine from your bladder (Faulkner Catheter), which is required due to the length and complexity of the ablation you are having.    After Catheter Ablation  Recovery immediately after your ablation in the hospital  After your catheter ablation procedure, youll be taken to a recovery room. You may need to lie flat for 2-6 hours while the insertion sites close up. During this time youll be monitored by a nurse, and you will be given medication to make you comfortable. You may go home later that day, or you may stay in the hospital overnight.  Going Home  When its time to go home, your will need to have an adult family member or friend drive you. Most people can walk, climb stairs, and perform light activity soon after catheter ablation. You can most likely return to your full routine within a few days. But you may be told to avoid running, heavy lifting, and other strenuous activities for a short time. Please make sure to follow any specific activity restrictions provided by the medical staff at the time of your discharge from the hospital.    Doctor's typically advise that you not drive for 24 hours after the procedure.     Avoid heavy physical activity and heavy lifting for  several days after the procedure to allow your body to heal.    Ask your doctor when you can expect to return to work.    Take your temperature and check your incision for signs of infection (redness, swelling, drainage, or warmth) every day for a week. It is normal to have a small bruise or lump where the catheter was inserted.    Take your medications exactly as directed. Dont skip doses or stop medication without consulting your physician prior.    Learn to take your own pulse and keep a record of your results.    Follow-Up  After most ablations you will have a follow up visit to see how you are doing, to assess your rhythm after your ablation, and to address any medication changes if necessary. In many cases, one ablation is enough to treat an arrhythmia. But sometimes the problem returns or another is found. If this happens, you may need a second catheter ablation. Tell your healthcare provider if you have any new or returning symptoms.  Common Symptoms after Catheter Ablation  In the first few weeks after catheter ablation, you may feel mild chest fullness or aching. You may also feel as if your heart is skipping beats or your heartbeat may feel faster than normal. You may think that your heart rhythm problem is about to return. These sensations are normal and usually go away with time. Talk to your healthcare provider if youre concerned.    When to Call Your Doctor    Increased bleeding, bruising, or pain at the insertion site    Shortness of breath or chest pain    Difficulty with your speech or walking, or any visual disturbance    Lightheaded, dizziness, or feeling faint    Coldness, swelling, or numbness of the arm or leg near the insertion site    A bruise or lump at the insertion site that is larger than a walnut    A fever over 100 F    Symptoms of your arrhythmia      Instructions for Patients Scheduled for Cardiac Procedures During the   COVID-19 Pandemic        Your Provider has determined that your  condition warrants going ahead with your procedure at this time.    You will need to be tested for COVID-19 48-72 hours (2-3 days) prior to your procedure.    We highly encourage you to get tested for COVID-19 at one of our designated [facility] testing sites. We process the tests within our lab, which allows us to get the results back quickly.     If you choose to get tested at a non-[facility] location, you will need to contact your primary care provider to make those arrangements to ensure the results are available to your cardiologist before you arrive for your procedure. If we DO NOT receive the results in time, your procedure may be postponed or canceled. The results will need to get faxed to [phone].    After testing, you will need to remain in self-quarantine until your procedure.    If you are notified of a positive COVID-19 result; you will need to call your provider at [phone] for further recommendations.      If the test is positive; PLEASE DO NOT PRESENT FOR YOUR PROCEDURE until you have been given further instructions.     You will not be called with negative results, so arrive as instructed unless otherwise notified.    Don't:    Don't invite visitors or friends into your home.    Don't leave your home unless absolutely necessary.    Don't share utensils and other household items with others in the home.  Do:    Wash your hands regularly with soap and water and use hand  with at least 60% alcohol if you don't have easy access to soap and water.     Disinfect surface areas daily, including doorknobs, electronics - especially phones, laptops and other devices.     Wash utensils and other items thoroughly.     Separate yourself from others in the household as best you can, including pets.    Keep your hands away from your face.     Practice all other prevention tips the CDC recommends, including covering your coughs and sneezes with a tissue or your sleeve and immediately  throwing the tissue into the trash and washing your hands.    Call the clinic if you develop any of the following symptoms before your procedure:    Fever     Cough    Shortness of breath    Sore throat    Runny or stuffy nose    Muscle or body aches    Headaches    Fatigue    Vomiting and diarrhea    These steps will help keep you and your family, other patients and hospital staff safe from COVID-19

## 2021-06-21 NOTE — PROGRESS NOTES
[facility] Follow Up Note    Assessment/Plan:  1. Hypertension  Under excellent control  Recommendation: Continue current indications.    2. Acquired hypothyroidism  We will update labs  - Thyroid Cascade    3. Vitamin D deficiency  We will update labs    4. Preventative health care  Mammography today.  Bone density when able.  Colonoscopy is up-to-date.  Will provide flu shot and Prevnar 13 today  - DXA Bone Density Scan; Future    5. Type 2 diabetes mellitus without complication, without long-term current use of insulin (H)  Weight has not improved.  She is exercising regularly.  She continues to bake but has done it and reduce quantities.  - HM2(CBC w/o Differential)  - Comprehensive Metabolic Panel; Future  - Glycosylated Hemoglobin A1c  - Lipid Cascade FASTING    6. Gastroesophageal reflux disease without esophagitis  Persistent symptoms of GERD with occasional cough.  Recommendations: Daily antacid such as Pepcid AC.  Of note, she is reluctant to take daily omeprazole.  Referral placed for endoscopy  - Ambulatory referral for Upper GI Endoscopy      Follow-up in 6 months    Sandra Waters MD    Chief Complaint:  Chief Complaint   Patient presents with     Cough     Follow-up     Heartburn       History of Present Illness:  Claudia is a 63 y.o. female who is here today for follow-up of her usual medical problems.  Of note, since last visit, her weight has not improved.  She does continue to exercise regularly.  Continues to bake.  She denies any polyuria or polydipsia.    With regard to blood pressure and heart rate, she is doing well.  She has not had any further chief complaints of palpitations.    With regard to reflux, she continues to hear a squeak in the chest when she pushes on her upper abdomen.  She is working hard on antireflux measures such as head of the bed elevation.  She still drinks coffee and eats chocolate.    Review of Systems:  A comprehensive review of systems was performed  and was otherwise negative    PFSH:  Social History: She is single.  She is semiretired.  She works for the [employer] and is worried about her job.  She has a significant other.  Social History     Tobacco Use   Smoking Status Never Smoker   Smokeless Tobacco Never Used       Past History:   Past Medical History:   Diagnosis Date     Anxiety      HTN (hypertension)      Hypothyroid        Current Outpatient Medications   Medication Sig Dispense Refill     aspirin (ASPIRIN LOW DOSE) 81 MG EC tablet Take 81 mg by mouth daily.       cholecalciferol, vitamin D3, 5,000 unit Tab Take 1 tablet by mouth daily.       metoprolol succinate (TOPROL-XL) 50 MG 24 hr tablet TAKE HALF TABLET ( 25 MG TOTAL) BY MOUTH 2 TIMES A DAY 90 tablet 1     multivitamin capsule Take 1 capsule by mouth daily.       SYNTHROID 75 mcg tablet TAKE ONE TABLET BY MOUTH DAILY AT 6:00AM 90 tablet 1     No current facility-administered medications for this visit.        Family History:   Family History   Problem Relation Age of Onset     Breast cancer Cousin          Physical Exam:  General Appearance:   He appears well and in no acute distress  Vitals:    11/27/18 0708   BP: 106/80   Patient Site: Left Arm   Patient Position: Sitting   Cuff Size: Adult Large   Pulse: 78   SpO2: 96%   Weight: 220 lb 12.8 oz (100.2 kg)     Wt Readings from Last 3 Encounters:   11/27/18 220 lb 12.8 oz (100.2 kg)   06/01/18 216 lb (98 kg)   10/17/17 215 lb 12.8 oz (97.9 kg)     Body mass index is 31.68 kg/m .    EYES: Eyelids, conjunctiva, and sclera were normal. Pupils were normal. Cornea, iris, and lens were normal bilaterally.  HEAD, EARS, NOSE, MOUTH, AND THROAT: Head and face were normal. Hearing was normal to voice and the ears were normal to external exam. Nose appearance was normal and there was no discharge. Oropharynx was normal.  TMs were normal.  NECK: Neck appearance was normal. There were no neck masses and the thyroid was not enlarged.  RESPIRATORY:  Breathing pattern was normal and the chest moved symmetrically.   Lung sounds were equal bilaterally.  CARDIOVASCULAR: Heart rate and rhythm were normal.  S1 and S2 were normal and there were no extra sounds or murmurs. Peripheral pulses in arms and legs were normal.  Jugular venous pressure was normal.  There was no peripheral edema.  GASTROINTESTINAL: The abdomen was normal in contour.  Bowel sounds were present.   Palpation detected no tenderness, mass, or enlarged organs.   MUSCULOSKELETAL: Skeletal configuration was normal and muscle mass was normal for age. Joint appearance was overall normal.  LYMPHATIC: There were no enlarged nodes.  SKIN/HAIR/NAILS: Skin color was normal.  There were no abnormal skin lesions.  Hair and nails were normal.  NEUROLOGIC: The patient was alert and oriented to person, place, time, and circumstance. Speech was normal. Cranial nerves were normal. Motor strength was normal for age. The patient was normally coordinated.  PSYCHIATRIC:  Mood and affect were normal and the patient had normal recent and remote memory. The patient's judgment and insight were normal.

## 2021-06-23 NOTE — PROGRESS NOTES
Assessment: pt seen for initial visit. Newly diagnosed. Reviewed DM patho. A1c and BG goals, risks and complications.   Reviewed DM meal plan. Cho vs non cho foods, portions and healthy eating. Carb counting and label reading. Pt notes she has been reading labels, looking at sugar and trying to cut back. She does drink about 1 can of regular pepsi/day. discussed trying Pepsi One. She is eating 3 meals/day. Also bakes a lot and has been trying to cut back but notes this is hard for her. Discussed using flour alternatives and stevia as a replacement for sugar.     Discussed the importance of exercise and how this can help lower BG as well. She has been trying to increase activity as well.   Pt has a meter at home but reports she stopped checking because she forgot what the BG goals were and felt she was doing ok. She explains she really doesn't want to check BG at home. She wonders if she can try the Eddie CGM. Reviewed how this works and that it may not be covered. She would like to try for it. Will send to pharmacy. If it is not covered or too expensive she agrees to start checking BG again at home.     Of note - pt states she has to pay a $60 co-pay for our visits. She may go to \[facility]\"" clinic instead, then she would only have to pay a $30 co-pay.     Plan: start monitoring BG again, rotating before and 2 hours after meals. Look at cho portions, goal 3-4/meal and 1-2/snacks. Continue to work on increasing activity. Try pepsi One. Call with any concerns.     Subjective and Objective:      Claudia NAVARRO Colunga is referred by Dr. Waters for Diabetes Education.     Lab Results   Component Value Date    HGBA1C 6.6 (H) 11/27/2018       Current diabetes medications:  None       Follow up:   Will f/u PRN due to cost. May call with any questions/concerns.       Education:     Monitoring   Meter (per above goals): Discussed  Monitoring: Assessed and Discussed  BG goals: Discussed and Literature provided    Nutrition  Management  Nutrition Management: Assessed, Discussed and Literature provided  Weight: Discussed and Literature provided  Portions/Balance: Assessed, Discussed and Literature provided  Carb ID/Count: Assessed, Discussed and Literature provided  Label Reading: Assessed, Discussed and Literature provided  Heart Healthy Fats: Assessed, Discussed and Literature provided  Menu Planning: Assessed, Discussed and Literature provided  Dining Out: Assessed and Discussed  Physical Activity: Assessed, Discussed and Literature provided    Diabetes Disease Process: Discussed and Literature provided    Acute Complications: Prevent, Detect, Treat:  Hypoglycemia: Discussed and Literature provided  Hyperglycemia: Assessed, Discussed and Literature provided  Sick Days: Discussed and Literature provided  Driving: Not addressed    Chronic Complications  Foot Care:Discussed and Literature provided  Skin Care: Discussed  Eye: Discussed and Literature provided  ABC: Discussed and Literature provided  Teeth:Literature provided  Goal Setting and Problem Solving: Assessed, Discussed and Literature provided  Barriers: Assessed and Discussed  Psychosocial Adjustments: Assessed, Discussed and Literature provided      Time spent with the patient: 30 mins for diabetes education and counseling.   Previous Education: no  Visit Type:JUAN CARLOS Dillard  1/17/2019

## 2021-06-24 NOTE — PROGRESS NOTES
"[facility] Follow Up Note    Assessment/Plan:  1. Type 2 diabetes mellitus without complication, without long-term current use of insulin (H)  With diabetic education, she has worked on lifestyle changes.  She has lost approximately 8 and a half pounds since last visit.  She is trying to exercise more regularly.  Current glycohemoglobin is at 6.2.  She was also given a prescription for a diabetic sensor.  She does not know how to work this and would like some instruction.  Discussion was had today with regard to initiation of a statin medication.  She would \"like to hold off for now \".  We will readdress this at next appointment.  Blood pressure is under good control.  She is on an aspirin per day.    - Glycosylated Hemoglobin A1c  - Basic Metabolic Panel  - Microalbumin, Random Urine  - Amb Referral to Medication Management    2. Stefanieborygmi  Continues with over active bowel sounds.  She has seen [place] [facility] in the past.  She is worried about her liver, gallbladder and is concerned about a cough.  Repeated exams have been unremarkable.  Recommendations: Will update diagnostics as below.  Hyperactive bowel sounds may be associated with liver or pancreatic abnormality.  We will do upper GI with small bowel follow-through.  She is reluctant to have an EGD as was recommended by GI.  - US Abdomen Complete; Future  - XR Upper GI W Small Bowel Follow Through; Future    3. Acquired hypothyroidism  We will update labs    4. Hypertension  Stable on current medications          Sandra Waters MD    Chief Complaint:  Chief Complaint   Patient presents with     Follow-up     patient would like a muscle relaxer        History of Present Illness:  Claudia is a 63 y.o. female who has had impaired fasting glucose for some time.  In late 2018, her glycohemoglobin approach 6.6.  Therefore, her diagnosis change.  She was referred to a diabetic educator.  She is finally \"getting serious \"about this DX.  She states " "she is learning to cook and eat foods in a better way.  She is avoiding chemicals and processed foods.  She is trying to exercise more vigilantly.  She does report that she is going to be fully retired in the next month.  This will leave her time to begin an exercise class etc.  Of note, her diabetic educator did provide her with a glucose sensor.  She does not know how to insert this and wanted to know if I thought this was a good idea.  She denies any polyuria or polydipsia.  She denies any numbness or tingling of her lower extremities.    Additional health issues reviewed.  She continues to have borborygmi.  She has very loud bowel sounds.  They are disturbing to her.  She has been seen by other physicians for this and no discrete etiology could be elucidated.  It was suggested by GI that she have a endoscopy.  She has had one in the past and does not want to proceed with something \"so invasive \".  She denies any abdominal pain.  There has been no change in the symptoms even with weight loss.    Review of Systems:  A comprehensive review of systems was performed and was otherwise negative    PFSH:  Social History: He is single.  She is retiring.  She does have a significant other in her life.  She enjoys young children.  Social History     Tobacco Use   Smoking Status Never Smoker   Smokeless Tobacco Never Used       Past History: Generalized anxiety disorder, hypertension and palpitations  Current Outpatient Medications   Medication Sig Dispense Refill     aspirin (ASPIRIN LOW DOSE) 81 MG EC tablet Take 81 mg by mouth daily.       cholecalciferol, vitamin D3, 5,000 unit Tab Take 1 tablet by mouth daily.       flash glucose scanning reader (FREESTYLE EDSON 14 DAY READER) Misc Use 1 each As Directed daily. 1 each 1     flash glucose sensor (FREESTYLE EDSON 14 DAY SENSOR) Kit Use 1 each As Directed daily. To change every 14 days 2 kit 2     metoprolol succinate (TOPROL-XL) 50 MG 24 hr tablet TAKE HALF TABLET ( 25 " "MG TOTAL) BY MOUTH 2 TIMES A DAY 90 tablet 1     multivitamin capsule Take 1 capsule by mouth daily.       SYNTHROID 75 mcg tablet TAKE ONE TABLET BY MOUTH DAILY AT 6:00AM 90 tablet 1     No current facility-administered medications for this visit.        Family History: Her parents lived until their 90s    Physical Exam:  General Appearance:   Appears well and in no acute distress  Vitals:    03/12/19 0955   BP: 116/84   Patient Site: Left Arm   Patient Position: Sitting   Cuff Size: Adult Large   Pulse: 78   SpO2: 96%   Weight: 213 lb 6 oz (96.8 kg)   Height: 5' 11\" (1.803 m)     Wt Readings from Last 3 Encounters:   03/12/19 213 lb 6 oz (96.8 kg)   11/27/18 220 lb 12.8 oz (100.2 kg)   06/01/18 216 lb (98 kg)     Body mass index is 29.76 kg/m .        "

## 2021-06-29 NOTE — PROGRESS NOTES
Progress Notes by Mel Kim CNP at 10/8/2020 10:30 AM     Author: Mel Kim CNP Service: -- Author Type: Nurse Practitioner    Filed: 10/8/2020 12:01 PM Encounter Date: 10/8/2020 Status: Signed    : Mel Kim CNP (Nurse Practitioner)            Thank you, Dr. Waters, for asking the [facility] team to see Ms. Claudia Colunga to evaluate typical atrial flutter post ablation.    Assessment/Recommendations     Assessment/Plan:    Diagnoses and all orders for this visit:    Paroxysmal atrial fibrillation (H) and having short episodes of paroxysmal A. fib per patient since ablation.  Recommended to stop metoprolol succinate 50 mg 1 tab orally every day and start sotalol 80 mg p.o. every 12 hours starting Saturday, October 10.  I discussed association between atrial fib and flutter.  I discussed natural progression with atrial fib and clearly symptomatic.  Therefore I recommend rhythm control.  Due to heart sensitivity to higher heart rates I recommended to go to sotalol versus using class Ic antiarrhythmics.  This will also help with hypertension.  I discussed association between JODI and atrial fib.  Rare snoring reported.  -     sotaloL (BETAPACE) 80 MG tablet; Take 1 tablet (80 mg total) by mouth every 12 (twelve) hours.  Dispense: 60 tablet; Refill: 3  -     [facility] - Today; Future; Expected date: 10/12/2020    Typical atrial flutter (H) and no symptoms of recurrence of atrial flutter since right CTI flutter ablation on August 25, 2020.  Documented to have atrial fib then.    Essential hypertension and systolic and diastolic hypertension seen today.  Switching from metoprolol to sotalol which should treat hypertension as well.  Reassess on Monday with twelve-lead EKG.    [id number] score of 4 and on Eliquis.  Follow up in clinic with me in 3 months and twelve-lead EKG on Monday, October 12 for QTC check with new sotalol start.     History of Present  "Illness/Subjective     Claudia Colunga is a very pleasant 65 y.o. female who comes in today for EP follow-up after right CTI flutter ablation on 8/25/2020.  Claudia Colunga has a known history of typical atrial flutter and was very symptomatic with it.  She therefore had flutter ablation with Dr. Adams and denies any complications post procedure.  She was cardioverted during ablation for atrial fib which developed after flutter was treated.  She complains of palpitations and went to the ER last week with an episode.  By the time she got to the ER, she was in sinus.  She is also concerned about hypertension seen on multiple occasions now.  She remains on metoprolol succinate 50 mg orally every day.  Claudia also has a history of hypertension, type 2 diabetes and hypothyroidism.  Her parents both lived to advanced age.  She is back golfing but complains of shortness of breath and heart racing with doing a flight of stairs.  Outside of atrial fib episode, she denies any cardiac symptoms.  Tachycardic symptoms are very bothersome to her and it causes her to cut back on activity as she is worried about \"damaging my heart \".    Cardiographics (reviewed):  Results for orders placed during the hospital encounter of 06/23/20   Echo Complete [ECH10] 06/23/2020    Narrative   1.Left ventricle ejection fraction is normal. The calculated left   ventricular ejection fraction is 72%.    2.Mild concentric left ventricular hypertrophy with mild left atrial   enlargement.    3.TAPSE is normal, which is consistent with normal right ventricular   systolic function.    4.No hemodynamically significant valvular heart abnormalities.    5.When compared to the previous study dated 11/15/2013, no significant   change.              Problem List:  Patient Active Problem List   Diagnosis   ? Hypothyroidism   ? Essential hypertension   ? Anxiety   ? Vitamin D deficiency   ? Eosinophilic gastroenteritis   ? Type 2 diabetes mellitus without " complication, without long-term current use of insulin (H)   ? Statin declined   ? Typical atrial flutter (H)   ? Eosinophilic esophagitis   ? Paroxysmal atrial fibrillation (H)     Revi  e  Physical Examination Review of Systems   w dipak  Vitals:    10/08/20 1035   BP: (!) 146/108   Pulse: 78   Resp: 16   SpO2: 97%     Body mass index is 29.71 kg/m .  Wt Readings from Last 3 Encounters:   10/08/20 213 lb (96.6 kg)   10/06/20 209 lb (94.8 kg)   08/30/20 214 lb (97.1 kg)     General Appearance:   Alert, well-appearing and in no acute distress.   HEENT: Atraumatic, normocephalic.  No scleral icterus, normal conjunctivae; mucous membranes pink and moist.     Chest: Chest symmetric, spine straight.   Lungs:   Respirations unlabored: Lungs are clear to auscultation.   Cardiovascular:   Normal first and second heart sounds with no murmurs, rubs, or gallops.  Regular, regular.  Radial and posterior tibial pulses are intact.  Normal JVD, no edema.       Extremities: No cyanosis or clubbing   Musculoskeletal: Moves all extremities   Skin: Warm, dry, intact.    Neurologic: Mood and affect are appropriate, alert and oriented to person, place, time, and situation    General: WNL  Eyes: WNL  Ears/Nose/Throat: WNL  Lungs: WNL  Heart: Chest Pain, Irregular Heartbeat(Palpitations)  Stomach: WNL  Bladder: WNL  Muscle/Joints: Joint Pain  Skin: WNL  Nervous System: WNL  Mental Health: WNL     Blood: Easy Bruising       Medical History  Surgical History Family History Social History     Past Medical History:   Diagnosis Date   ? Anxiety    ? Atrial flutter (H)    ? Diabetes mellitus, type II (H)    ? Eosinophilic esophagitis    ? Essential hypertension    ? Hypothyroid    ? Overflow incontinence     Past Surgical History:   Procedure Laterality Date   ? EP ABLATION AFLUTTER Right 8/25/2020    Procedure: EP Ablation Atrial Flutter;  Surgeon: Vick Adams MD;  Location: [facility];  Service: Cardiology   ? OVARIAN CYST  SURGERY     ? KS BIOPSY OF BREAST, INCISIONAL     ? KS BIOPSY OF BREAST, INCISIONAL      Family History   Problem Relation Age of Onset   ? Breast cancer Maternal cousin    ? Dementia Mother 91   ? Hip fracture Mother 93        went to nursing home after this   ? Stroke Mother 96        had to be fed by hand prior to her death   ? Colon cancer Father    ? Aortic aneurysm Father         abdominal, repaired   ? Cerebral aneurysm Father    ? Peripheral vascular disease Father 96        ruptured femoral aneurysm?   ? Diabetes type II Sister    ? Heart disease Neg Hx     Social History     Tobacco Use   Smoking Status Never Smoker   Smokeless Tobacco Never Used     Social History     Substance and Sexual Activity   Alcohol Use Yes    Comment: rare, only with dinner out with friends        Medications  Allergies     Current Outpatient Medications   Medication Sig Dispense Refill   ? apixaban ANTICOAGULANT (ELIQUIS) 5 mg Tab tablet Take 1 tablet (5 mg total) by mouth 2 (two) times a day. 60 tablet 3   ? multivitamin capsule Take 1 capsule by mouth daily.     ? SYNTHROID 75 mcg tablet TAKE 1 TABLET BY MOUTH ONCE DAILY AT 6AM 90 tablet 3   ? sotaloL (BETAPACE) 80 MG tablet Take 1 tablet (80 mg total) by mouth every 12 (twelve) hours. 60 tablet 3     No current facility-administered medications for this visit.       Allergies   Allergen Reactions   ? Lisinopril Rash   ? Penicillins Rash      Medical, surgical, family, social history, and medications were all reviewed and updated as necessary.   Lab Results    Chemistry/lipid CBC Cardiac Enzymes/BNP/TSH/INR     Lab Results   Component Value Date    CREATININE 0.82 10/06/2020    BUN 12 10/06/2020     10/06/2020    K 4.1 10/06/2020     10/06/2020    CO2 27 10/06/2020     Creatinine (mg/dL)   Date Value   10/06/2020 0.82   08/31/2020 0.79   08/25/2020 0.75   08/21/2020 0.76     No results found for: BNP Lab Results   Component Value Date    WBC 8.5 10/06/2020    HGB  13.9 10/06/2020    HCT 40.5 10/06/2020    MCV 94 10/06/2020     10/06/2020     Lab Results   Component Value Date    INR 0.92 03/27/2014      Lab Results   Component Value Date    CHOL 164 10/09/2019    HDL 48 (L) 10/09/2019    LDLCALC 97 10/09/2019    TRIG 97 10/09/2019          Total Time- 45 minutes with greater than 50% spent talking to patient and family regarding patient's relevant diagnoses.  This note has been dictated using voice recognition software. Any grammatical, typographical, or context distortions are unintentional and inherent to the software.

## 2021-06-29 NOTE — PROGRESS NOTES
"Progress Notes by Mel Kim CNP at 7/8/2020  1:30 PM     Author: Mel Kim CNP Service: -- Author Type: Nurse Practitioner    Filed: 7/8/2020  2:10 PM Encounter Date: 7/8/2020 Status: Signed    : Mel Kim CNP (Nurse Practitioner)       The patient has been notified of following:     \"This telephone visit will be conducted via a call between you and your physician/provider. We have found that certain health care needs can be provided without the need for a physical exam.  This service lets us provide the care you need with a phone conversation.  If a prescription is necessary we can send it directly to your pharmacy.  If lab work is needed we can place an order for that and you can then stop by our lab to have the test done at a later time. If during the course of the call the physician/provider feels a telephone visit is not appropriate, you will not be charged for this service.\"         HEART CARE PHONE ENCOUNTER        Appointment is being conducted as a telephone visit, to reduce risk of exposure given the current status of Coronovirus in our community. This telephone visit is being conducted via a call between the patient and physician/provider. Health care needs are being provided without a physical exam.     Assessment/Recommendations   Assessment & PLAN:     Diagnoses and all orders for this visit:    Essential hypertension and unable to evaluate BP due to no home blood pressure monitoring.    Typical atrial flutter (H) and directly symptomatic with palpitations.  She reports rare snoring; discussed association between JODI and atrial arrhythmias.  I discussed EP study with right CTI flutter ablation procedure.  I quoted her 1% risk of cardiac perforation and less than 1% risk of permanent pacemaker with procedure as most significant risks.  I discussed that some bruising in her right groin is not unusual.  I discussed restrictions post procedure. I answered multiple " questions regarding procedure.  Will be done with conscious sedation.  After discussion, Lillian wants to proceed with ablation.  I quoted her 99% chance of success of curing right typical atrial flutter but still carries about a 50% chance that she could develop atrial fib in the future which is why we may keep her on anticoagulation despite ablation as she has high risk of stroke.  The decision regarding anticoagulation will be made by Dr. Adams.  I discussed his experience with ablation.  After discussion she wants to proceed with scheduling with him if he approves.    [id number] score of 4 and on Eliquis.  Discussed not discontinue Eliquis prior to ablation procedure and important to stay on this medication and use it consistently leading into ablation will stay on Eliquis for at least a month post ablation.  I discussed she may need to be on anticoagulation long-term due to high risk of stroke and high association between atrial flutter and atrial fib.  Follow up in clinic as planned after ablation.    Total time of call between patient and provider was 23 minutes .  Start [id number] and end time [id number].       History of Present Illness/Subjective    Claudia Colunga is a 64 y.o. female who is being evaluated via a billable telephone visit.    Claudia Colunga is following up in EP clinic for consult regarding newly diagnosed typical atrial flutter on 6/23/2020 and converted with medications during hospitalization.  She may have had a previous episode about a month before that.  She was walking and her heart started to race.  It  raced for less than an hour.  She does not remember any other symptoms other than feeling of racing heart.  On June 23 she felt her heart racing when she was getting ready for bed.  She waited an hour and her heart rate did not come down so she called the ER and was advised to come in.  She was admitted.  She denies any episodes of the heart racing since discharge from the hospital.  She has  done some internet reading and is interested in ablation.  She is taking metoprolol succinate 50 mg p.o. in am and advised to move to bedtime.  Claudia's medical history is also significant for hypertension and type 2 diabetes.  She also has hypothyroidism and anxiety.  I have reviewed and updated the patient's Past Medical History, Social History, Family History and Medication List.   Cardiographics reviewed in prep for this visit as follows:  June 2020 Echo shows EF 72%.  Mild left atrial enlargement.  No significant valve disease.  6/23/20 twelve-lead EKG shows typical atrial flutter with RVR of 112 and QTC of 515.  Typical atrial flutter also documented later on that same day.  TSH normal.  Creatinine 0.82 and potassium 3.9.  Physical Examination not perform given phone encounter Review of Systems                                                Medical History  Surgical History Family History Social History   Past Medical History:   Diagnosis Date   ? Anxiety    ? Essential hypertension    ? Hypothyroid    ? Overflow incontinence     Past Surgical History:   Procedure Laterality Date   ? OVARIAN CYST SURGERY     ? ND BIOPSY OF BREAST, INCISIONAL     ? ND BIOPSY OF BREAST, INCISIONAL      Family History   Problem Relation Age of Onset   ? Breast cancer Maternal cousin    ? Dementia Mother 91   ? Hip fracture Mother 93        went to nursing home after this   ? Stroke Mother 96        had to be fed by hand prior to her death   ? Colon cancer Father    ? Aortic aneurysm Father         abdominal, repaired   ? Cerebral aneurysm Father    ? Peripheral vascular disease Father 96        ruptured femoral aneurysm?   ? Diabetes type II Sister    ? Heart disease Neg Hx     Social History     Socioeconomic History   ? Marital status: Single     Spouse name: Not on file   ? Number of children: 0   ? Years of education: Not on file   ? Highest education level: Not on file   Occupational History   ? Occupation: administration      Employer: RETIRED     Comment: [employer]   Social Needs   ? Financial resource strain: Not on file   ? Food insecurity     Worry: Not on file     Inability: Not on file   ? Transportation needs     Medical: Not on file     Non-medical: Not on file   Tobacco Use   ? Smoking status: Never Smoker   ? Smokeless tobacco: Never Used   Substance and Sexual Activity   ? Alcohol use: Yes     Comment: rare, only with dinner out with friends   ? Drug use: No   ? Sexual activity: Not on file   Lifestyle   ? Physical activity     Days per week: 7 days     Minutes per session: 40 min   ? Stress: Not on file   Relationships   ? Social connections     Talks on phone: Not on file     Gets together: Not on file     Attends Yarsanism service: Not on file     Active member of club or organization: Not on file     Attends meetings of clubs or organizations: Not on file     Relationship status: Not on file   ? Intimate partner violence     Fear of current or ex partner: Not on file     Emotionally abused: Not on file     Physically abused: Not on file     Forced sexual activity: Not on file   Other Topics Concern   ? Not on file   Social History Narrative    She walks her poodle daily for 30-60 minutes. She enjoys going to the [place] to paddle in the pool.          Medications  Allergies   Current Outpatient Medications   Medication Sig Dispense Refill   ? apixaban ANTICOAGULANT (ELIQUIS) 5 mg Tab tablet Take 1 tablet (5 mg total) by mouth 2 (two) times a day. 60 tablet 3   ? ascorbic acid, vitamin C, (ASCORBIC ACID) 250 mg Chew Chew 500 mg every 6 (six) hours as needed.     ? flash glucose scanning reader (FREESTYLE EDSON 14 DAY READER) Misc Use 1 each As Directed daily. 1 each 1   ? flash glucose sensor (FREESTYLE EDSON 14 DAY SENSOR) Kit Use 1 each As Directed daily. To change every 14 days 2 kit 2   ? metoprolol succinate (TOPROL-XL) 50 MG 24 hr tablet TAKE 1/2 TABLET BY MOUTH 2 TIMES A DAY (Patient taking  differently: Take 50 mg by mouth daily. ) 90 tablet 3   ? multivitamin capsule Take 1 capsule by mouth daily.     ? SYNTHROID 75 mcg tablet TAKE ONE TABLET BY MOUTH DAILY AT 6:00AM 90 tablet 3     No current facility-administered medications for this visit.     Allergies   Allergen Reactions   ? Lisinopril Rash   ? Penicillins Rash         Lab Results    Chemistry/lipid CBC Cardiac Enzymes/BNP/TSH/INR   Lab Results   Component Value Date    CHOL 164 10/09/2019    HDL 48 (L) 10/09/2019    LDLCALC 97 10/09/2019    TRIG 97 10/09/2019    CREATININE 0.82 06/23/2020    BUN 11 06/23/2020    K 3.9 06/23/2020     06/23/2020     06/23/2020    CO2 24 06/23/2020    Lab Results   Component Value Date    WBC 8.9 06/23/2020    HGB 14.6 06/23/2020    HCT 44.2 06/23/2020    MCV 95 06/23/2020     06/23/2020    Lab Results   Component Value Date    CKMB 2 01/16/2014    TROPONINI <0.01 06/23/2020    TSH 1.35 06/23/2020    INR 0.92 03/27/2014        Mel Kim

## 2021-06-30 ENCOUNTER — COMMUNICATION - HEALTHEAST (OUTPATIENT)
Dept: SCHEDULING | Facility: CLINIC | Age: 66
End: 2021-06-30

## 2021-06-30 ENCOUNTER — COMMUNICATION - HEALTHEAST (OUTPATIENT)
Dept: CARDIOLOGY | Facility: CLINIC | Age: 66
End: 2021-06-30

## 2021-06-30 NOTE — PROGRESS NOTES
Progress Notes by Antwon Chandra at 3/4/2021 12:30 PM     Author: Antwon Chandra Service: -- Author Type: Patient Access    Filed: 3/4/2021  1:41 PM Encounter Date: 3/4/2021 Status: Signed    : Antwon Chandra (Patient Access)       Parrot Study Consent Note    Study Purpose: Atrial Fibrillation Algorithm Clinical Validation Study, prospective, multi-center, non-significant risk     Claudia Colunga was called today, 03/04/21, to discuss participation in the Parrot Study. The consent form version 3.0 was reviewed with subject. Subject was provided with a virtual copy of the consent form via e-channel e-consenting software.    The consent discuss included:    Study description and purpose     Qualifications for participation    Screening visit    Study procedures and follow up visits    Participant responsibilities     Study restrictions    Length of study    Study data    Potential risks and discomforts    New information    Potential benefits of participation    Incidental findings    Alternate therapies    Compensation for participation    Cost/Compensation for research related injury    Study Funding    Withdrawal participation    Confidentiality     Study contacts    Legal right    The subject was provided time to review the consent form and consider participation her questions were answered to her satisfaction. The patient has voluntarily agreed to participate in the above noted study.     The consent form version 3.0 and HIPPA form version 3.0 was signed 03/04/21 via e-channel e-consenting software. A copy of the signed consent form is delivered to patient via email from e-channel e-consent software. A copy will be placed in subject's medical record.     Past Medical History:   Diagnosis Date   ? Atrial flutter (H)    ? Diabetes mellitus, type II (H)    ? Essential hypertension    ? Hypothyroid        [unfilled]      Current Outpatient Medications:   ?  ELIQUIS 5 mg Tab tablet, TAKE 1 TABLET  BY MOUTH TWICE A DAY, Disp: 60 tablet, Rfl: 3  ?  metoprolol succinate (TOPROL-XL) 50 MG 24 hr tablet, Take 50 mg by mouth daily., Disp: , Rfl:   ?  SYNTHROID 75 mcg tablet, TAKE 1 TABLET BY MOUTH ONCE DAILY AT 6AM, Disp: 90 tablet, Rfl: 3    Allergies   Allergen Reactions   ? Lisinopril Rash   ? Penicillins Rash       Antwon Chandra

## 2021-06-30 NOTE — PROGRESS NOTES
Progress Notes by Mel Kim CNP at 1/14/2021  8:30 AM     Author: Mel Kim CNP Service: -- Author Type: Nurse Practitioner    Filed: 1/14/2021  9:49 AM Encounter Date: 1/14/2021 Status: Signed    : Mel Kim CNP (Nurse Practitioner)            Thank you, Dr. Waters, for asking the [facility] team to see Ms. Claudia Colunga to evaluate paroxysmal atrial fibrillation.    Assessment/Recommendations     Assessment/Plan:    Diagnoses and all orders for this visit:    Paroxysmal atrial fibrillation (H) and symptomatic.  Claudia is to call me if she is having more episodes of atrial fib and then I would definitely recommend that she stop metoprolol and start sotalol.  QTC check after fifth dose or next available day in clinic.  I reviewed the call system in our clinic and encouraged her to use this versus going to the ER  or urgent care unless significant symptoms.  I also discussed pulmonary vein isolation ablation as an option for treatment.  I discussed the difference between the pathophysiology of typical atrial flutter and atrial fibrillation and how ablation differs for these.  Several questions answered.  Essential hypertension and blood pressure at goal.  Right typical atrial flutter and had ablation and no evidence of recurrence since ablation.      [id number] score of 4 and on Eliquis.  Follow up in clinic with me in 6 months.     History of Present Illness/Subjective     Claudia Colunga is a very pleasant 65 y.o. female who comes in today for EP follow-up for paroxysmal atrial fibrillation.  Claudia Colunga has a known history of typical atrial flutter and was very symptomatic with it.  She therefore had flutter ablation with Dr. Adams and denies any complications post procedure.  She was cardioverted during ablation for atrial fib which developed after flutter was treated.    Claudia has gone several times to the ER for atrial fibrillation.  She was  last in the emergency room in October and documented in sinus tachycardia at that time.  She is very sensitive to her heart rhythm and knows when she is having premature and skipped beats.  She also feels rapid heart rate and this concerned that she may be back in A. fib.  I saw her in clinic on October 8 and wanted to switch her from metoprolol succinate 50 mg every day to sotalol 80 mg p.o. every 12 hours.  She picked up the medication but she never started it because she read in the handout that she should be hospitalized for start.  Those concerns were addressed earlier.  She feels palpitations off and on but no significant episodes since then.  She is worried at times that she could be having atrial fib just because her heart rate is faster.  Discussed chronotropic response to activity in normal rhythm.  Also discussed that it is possible that she has PAT and if episode longer than several minutes could try Valsalva maneuver.  I discussed Valsalva maneuver does not help for atrial fibrillation.        Cardiographics (reviewed):  Results for orders placed during the hospital encounter of 06/23/20   Echo Complete [ECH10] 06/23/2020    Narrative   1.Left ventricle ejection fraction is normal. The calculated left   ventricular ejection fraction is 72%.    2.Mild concentric left ventricular hypertrophy with mild left atrial   enlargement.    3.TAPSE is normal, which is consistent with normal right ventricular   systolic function.    4.No hemodynamically significant valvular heart abnormalities.    5.When compared to the previous study dated 11/15/2013, no significant   change.             Cardiac testing personally reviewed:  10/6/2020 twelve-lead EKG shows sinus tachycardia of 101 and QTC of 484 ms.  November 20 27-day GETACHEW monitor shows sinus rhythm with bundle branch block and average heart rate 74.  Heart rate range 50-1 2 0.  No A. fib seen.  Rare ectopy.     Problem List:  Patient Active Problem List    Diagnosis   ? Hypothyroidism   ? Essential hypertension   ? Anxiety   ? Vitamin D deficiency   ? Eosinophilic gastroenteritis   ? Type 2 diabetes mellitus without complication, without long-term current use of insulin (H)   ? Statin declined   ? Typical atrial flutter (H)   ? Eosinophilic esophagitis   ? Paroxysmal atrial fibrillation (H)     Revi  e  Physical Examination Review of Systems   leonard quesada  Vitals:    01/14/21 0832   BP: 138/80   Pulse: 80   Resp: 16   SpO2: 96%     Body mass index is 29.29 kg/m .  Wt Readings from Last 3 Encounters:   01/14/21 210 lb (95.3 kg)   10/08/20 213 lb (96.6 kg)   10/06/20 209 lb (94.8 kg)     General Appearance:   Alert, well-appearing and in no acute distress.   HEENT: Atraumatic, normocephalic.  No scleral icterus, normal conjunctivae; mucous membranes pink and moist.     Chest: Chest symmetric, spine straight.   Lungs:   Respirations unlabored: Lungs are clear to auscultation.   Cardiovascular:   Normal first and second heart sounds with no murmurs, rubs, or gallops.  Regular, regular.  Radial and posterior tibial pulses are intact.  Normal JVD, no edema.       Extremities: No cyanosis or clubbing   Musculoskeletal: Moves all extremities   Skin: Warm, dry, intact.    Neurologic: Mood and affect are appropriate, alert and oriented to person, place, time, and situation    General: Weight Loss  Eyes: WNL  Ears/Nose/Throat: WNL  Lungs: WNL  Heart: Irregular Heartbeat  Stomach: WNL  Bladder: WNL  Muscle/Joints: Joint Pain  Skin: WNL  Nervous System: WNL  Mental Health: WNL     Blood: WNL       Medical History  Surgical History Family History Social History     Past Medical History:   Diagnosis Date   ? Anxiety    ? Atrial flutter (H)    ? Diabetes mellitus, type II (H)    ? Eosinophilic esophagitis    ? Essential hypertension    ? Hypothyroid    ? Overflow incontinence     Past Surgical History:   Procedure Laterality Date   ? EP ABLATION AFLUTTER Right 8/25/2020    Procedure:  EP Ablation Atrial Flutter;  Surgeon: Vick Adams MD;  Location: [facility] Cath Lab;  Service: Cardiology   ? OVARIAN CYST SURGERY     ? MI BIOPSY OF BREAST, INCISIONAL     ? MI BIOPSY OF BREAST, INCISIONAL      Family History   Problem Relation Age of Onset   ? Breast cancer Maternal cousin    ? Dementia Mother 91   ? Hip fracture Mother 93        went to nursing home after this   ? Stroke Mother 96        had to be fed by hand prior to her death   ? Colon cancer Father    ? Aortic aneurysm Father         abdominal, repaired   ? Cerebral aneurysm Father    ? Peripheral vascular disease Father 96        ruptured femoral aneurysm?   ? Diabetes type II Sister    ? Heart disease Neg Hx     Social History     Tobacco Use   Smoking Status Never Smoker   Smokeless Tobacco Never Used     Social History     Substance and Sexual Activity   Alcohol Use Yes    Comment: rare, only with dinner out with friends        Medications  Allergies     Current Outpatient Medications   Medication Sig Dispense Refill   ? ELIQUIS 5 mg Tab tablet TAKE 1 TABLET BY MOUTH TWICE A DAY 60 tablet 3   ? metoprolol succinate (TOPROL-XL) 50 MG 24 hr tablet Take 50 mg by mouth daily.     ? multivitamin capsule Take 1 capsule by mouth daily.     ? SYNTHROID 75 mcg tablet TAKE 1 TABLET BY MOUTH ONCE DAILY AT 6AM 90 tablet 3   ? sotaloL (BETAPACE) 80 MG tablet Take 1 tablet (80 mg total) by mouth every 12 (twelve) hours. 60 tablet 3     No current facility-administered medications for this visit.       Allergies   Allergen Reactions   ? Lisinopril Rash   ? Penicillins Rash      Medical, surgical, family, social history, and medications were all reviewed and updated as necessary.   Lab Results    Chemistry/lipid CBC Cardiac Enzymes/BNP/TSH/INR     Lab Results   Component Value Date    CREATININE 0.82 10/06/2020    BUN 12 10/06/2020     10/06/2020    K 4.1 10/06/2020     10/06/2020    CO2 27 10/06/2020     Creatinine (mg/dL)   Date Value    10/06/2020 0.82   08/31/2020 0.79   08/25/2020 0.75   08/21/2020 0.76     No results found for: BNP Lab Results   Component Value Date    WBC 8.5 10/06/2020    HGB 13.9 10/06/2020    HCT 40.5 10/06/2020    MCV 94 10/06/2020     10/06/2020     Lab Results   Component Value Date    INR 0.92 03/27/2014      Lab Results   Component Value Date    CHOL 164 10/09/2019    HDL 48 (L) 10/09/2019    LDLCALC 97 10/09/2019    TRIG 97 10/09/2019          Total Time- 45 minutes spent on date of encounter doing chart review, history and exam, documentation and further activities as noted above.  This note has been dictated using voice recognition software. Any grammatical, typographical, or context distortions are unintentional and inherent to the software.

## 2021-06-30 NOTE — PROGRESS NOTES
Progress Notes by Antwon Chandra at 3/4/2021 12:30 PM     Author: Antwon Chandra Service: -- Author Type: Patient Access    Filed: 3/4/2021  1:41 PM Encounter Date: 3/4/2021 Status: Signed    : Antwon Chandra (Patient Access)         [facility] Study Inclusion / Exclusion Criteria    Study Purpose: Atrial Fibrillation Algorithm Clinical Validation Study, prospective, multi-center, non significant risk     Protocol Version V 3.0 - 10 December 2020    Subject Cohort: 3.0    Inclusion Criteria-all must be Yes  Yes/No Cohort Subject must meet all inclusion criteria:    Yes   All 1. Able to read, understand, and provide written informed consent.   Yes All 2. Willing and able to participate in the study procedures as described in the consent form.    Yes All 3. Be 22 years of age and older   Yes    All 4. Able to communicate effectively with and follow instructions from study staff   Yes All 5. Able to wear the wrist device for duration of study participation     NA Cohort 1 6. Have no known medical history of AF    N/A Cohort 2 7. have no known medical history of AF and active diagnosis of at least one of the following arrhythmias within the past 2 years:   A. Frequent PACs, defined as at least 1% of total beats of atrial ectopic beats by 24-48 hour Holter, ambulatory ECG monitor, or implantable loop recorder)   B. Frequent PVCs, defined as at least 1% of total beats of ventricular ectopic beats by 24-48 hour Holter, ambulatory ECG monitor, or implantable loop recorder   C. SVT, which will include atrial tachycardia, atrioventricular roberto re-entrant tachycardia, atrioventricular re-entrant tachycardia by 12-lead ECG or 24-48 hour Holter, ambulatory ECG monitor, or implantable loop recorder   D. NSVT, defined as three or more consecutive ventricular beats at a rate of at least 100 beats per minute and lasting no more than 30 seconds, by 12-lead ECG or 24-48 hour Holter, ambulatory ECG monitor, or implantable loop  recorder     Yes Cohort 3 and 4 8. have a known diagnosis of AF at the time of screening (confirmed by electronic medical record (EMR) or self-report) and have had a recent episode of AF, or confirmed AF on ECG, in the past 12 months     NA Cohort 4 9. have a known diagnosis of permanent AF at the time of screening (confirmed by EMR or self-report) and have had a recent episode of AF, or confirmed AF on ECG, in the past 12 months    Yes NA 10. Meet additional binning based on demographics             Exclusion Criteria-all must be no  Yes/No Criteria # Subject must not meet any exclusion criteria:    No All 1. Physical disability that prevents safe and adequate testing.   No All 2. Mental impairment resulting in limited ability to cooperate.   No All 3. Known uncontrolled medical conditions, Such as (but not limited to) significant anemia, important electrolyte imbalance and untreated or uncontrolled thyroid disease   No All 4. Open wound(s) on the wrist and / or forearm   No All 5. Tattoos, large moles, or scars on the wrist at the wrist device location    No All 6. Skin conditions on either wrist that would preclude subject from wearing a wristband on either wrist    Yes All 7. Known allergy or sensitivity to medical adhesives, isopropyl alcohol, wristbands, or ECG patch.   No All 8. Medical history or physical assessment finding that makes the subjects inappropriate for participation according to investigator(s)   No All 9. Participation in a previous study that used a wrist-worn sensor device with a simultaneous ECG reference patch    No All 10. Implantable cardiac devices such as a Pacemaker or Implantable Cardioverter defibrillator    No All 11. Clinically significant hand tremors, as judged by the investigator    No All 12. Acute illness including COVID and other respiratory illnesses    No Cohort 3 and 4 13. Subject with known history of AF on a rhythm control medications with history of complete AF rhythm  control (I.e history of zero AF burden) will be excluded from Cohort 3 and 4.  (Subjects enrolled into Cohort 2 can be on rate control medications)      Subject meets inclusion criteria, but cannot be included in the study due to significant sensitivity to medical adhesives. Subject will not be included in the study.    Antwon Chandra

## 2021-06-30 NOTE — PROGRESS NOTES
Progress Notes by Belinda Michaels CNP at 3/4/2021 12:30 PM     Author: Belinad Michaels CNP Service: -- Author Type: Nurse Practitioner    Filed: 3/4/2021  1:41 PM Encounter Date: 3/4/2021 Status: Signed    : Belinda Michaels CNP (Nurse Practitioner)           Study Purpose: Atrial Fibrillation Algorithm Clinical Validation Study, prospective, multi-center, non significant risk       Physical Examination performed via live video encounter   General Appearance:   Alert, well appearing,no distress, normal body habitus, upright.   HENT/Mouth: Atraumatic normocephalic membranes moist, mucous membranes pink and moist  No nasal discharge or   bleeding gums.    EYES:  no scleral icterus, normal conjunctivae   Neck: no evidence of thyromegaly.  Supple.    Chest/Lungs:   No audible wheezing equal chest wall expansion. Non labored breathing.  No cough.   Cardiovascular:   No evidence of elevated jugular venous pressure.      Abdomen:  no evidence of abdominal distention. No observe juandice.     Extremities: no cyanosis or clubbing noted.   No visible edema bilaterally   Skin:   skin dry, no visible ecchymosis  No markings of the bilateral wrists.   Neurologic: Mood affect appropriate to place time and person   Normal arm motion bilateral, no tremors.  No evidence of focal defect.              COVID: No symptoms, chills, shortness of breath, or difficulty breathing, muscle or body aches, headache, loss of taste or smell, sore throat, runny nose, congestion, nausea, vomiting or diarrhea.according to the [place] Department of Health and Human Services based on the CARES Act.     Patient states when she has her cardiac ablation, she wore a patch for the amount of time during the procedure and when it was removed it was red for several days more concentrated on the perimeter of the patch. Patient wore ECG patches for one week with out redness.  Patient was recommended not to be in this study due to the sensitivity of her skin.   Patient is aware.        Belinda Michaels, CNP

## 2021-07-04 NOTE — TELEPHONE ENCOUNTER
Telephone Encounter by Delores Meza LPN at 6/30/2021  9:24 AM     Author: Delores Meza LPN Service: -- Author Type: Licensed Nurse    Filed: 6/30/2021  9:30 AM Encounter Date: 6/30/2021 Status: Signed    : Delores Meza LPN (Licensed Nurse)       Pt call for Stefan last seen in clinic 1/2021  Pt states issue with BP going up over weekend 150-160 over 90 to 100 HR's have been good  P states she has been outside in the heat a lot,is hydrated felt over heated  Pt states she awoke last alicia felt heart was racy but ok found BP mckayla 160 over 102,so pt took an extra 1/2 tab of her metoprolol per Dr Waters who follows her for her BP  Reviewed with pt she should be staying out of the heat stated she does have air conditioning and should call and follow up with Dr Waters and pt agrees to plan

## 2021-07-04 NOTE — TELEPHONE ENCOUNTER
Telephone Encounter by Clair Null RN at 6/30/2021  9:26 AM     Author: Clair Null RN Service: -- Author Type: Registered Nurse    Filed: 6/30/2021  9:34 AM Encounter Date: 6/30/2021 Status: Signed    : Clair Null RN (Registered Nurse)       Claudia woke up at 2 am this morning and her blood pressure was 160/102. She took her metoprolol then .  Upon waking this am she took her bp again and it was 152/101 . She denies dizziness or other symtpoms but she did have a headache on Monday.  Otherwise just feeling tired. Does take eliquis.  Per protocol she should be seen within 3 days due to elevated readings.  Patient given home care measures per protocol.  Also told when to call back if further symptoms.  Patient agrees to this plan. Sent to scheduling.       COVID 19 Nurse Triage Plan/Patient Instructions    Please be aware that novel coronavirus (COVID-19) may be circulating in the community. If you develop symptoms such as fever, cough, or SOB or if you have concerns about the presence of another infection including coronavirus (COVID-19), please contact your health care provider or visit  [email or web address].    Disposition/Instructions    In-Person Visit with provider recommended. Reference Visit Selection Guide.    Thank you for taking steps to prevent the spread of this virus.  o Limit your contact with others.  o Wear a simple mask to cover your cough.  o Wash your hands well and often.    Resources    [facility]: About COVID-19: [email or web address]    CDC: What to Do If You're Sick: www.cdc.gov/coronavirus/2019-ncov/about/steps-when-sick.html    CDC: Ending Home Isolation: www.cdc.gov/coronavirus/2019-ncov/hcp/disposition-in-home-patients.html     CDC: Caring for Someone: www.cdc.gov/coronavirus/2019-ncov/if-you-are-sick/care-for-someone.html     [facility]: Interim Guidance for Hospital Discharge to Home:  www.health.[facility].mn.us/diseases/coronavirus/hcp/hospdischarge.pdf    [facility] clinical trials (COVID-19 research studies): clinicalaffairs.[facility]/umn-clinical-trials     Below are the COVID-19 hotlines at the [place] Department of Health ([facility]). Interpreters are available.   o For health questions: Call 252-281-0435 or 1-694.643.1523 (7 a.m. to 7 p.m.)  o For questions about schools and childcare: Call 859-871-4663 or 1-823.210.7162 (7 a.m. to 7 p.m.)     Reason for Disposition  ? Systolic BP >= 160 OR Diastolic >= 100    Additional Information  ? Negative: Systolic BP >= 160 OR Diastolic >= 100, and any cardiac or neurologic symptoms (e.g., chest pain, difficulty breathing, unsteady gait, blurred vision)  ? Negative: Sounds like a life-threatening emergency to the triager  ? Negative: Pregnant > 20 weeks or postpartum (< 6 weeks after delivery) and new hand or face swelling  ? Negative: Pregnant > 20 weeks and BP > 140/90  ? Negative: Patient sounds very sick or weak to the triager  ? Negative: BP Systolic BP >= 140 OR Diastolic >= 90 and postpartum (from 0 to 6 weeks after delivery)  ? Negative: Systolic BP >= 180 OR Diastolic >= 110, and missed most recent dose of blood pressure medication  ? Negative: Systolic BP >= 180 OR Diastolic >= 110  ? Negative: Patient wants to be seen  ? Negative: Ran out of BP medications  ? Negative: Taking BP medications and feels is having side effects (e.g., impotence, cough, dizziness)    Protocols used: HIGH BLOOD PRESSURE-A-OH

## 2021-07-04 NOTE — TELEPHONE ENCOUNTER
"Telephone Encounter by Delores Meza LPN at 6/30/2021  9:24 AM     Author: Delores Meza LPN Service: -- Author Type: Licensed Nurse    Filed: 6/30/2021  9:24 AM Encounter Date: 6/30/2021 Status: Signed    : Delores Meza LPN (Licensed Nurse)       ----- Message from Olinda Quijano RN sent at 6/30/2021  8:45 AM CDT -----  Regarding: FW: STEFAN PATIENT  Luci-  Would you mind following up with this pt?  Pt has been seeing Stefan Baumannss  ----- Message -----  From: Megha Shea HUC  Sent: 6/30/2021   8:32 AM CDT  To: [facility] Rn Support Pool  Subject: STEFAN PATIENT                                      General phone call:    Caller: PATIENT    Primary cardiologist: DNONOFRE    Detailed reason for call: PATIENT HAVING \"HIGH BP PROBLEMS\" PLEASE CALL AND ADVISE.    New or active symptoms? YES    Best phone number: [phone]    Best time to contact: ANY    Ok to leave a detailed message? YES    Device? NO    Additional Info:               "

## 2021-07-07 ENCOUNTER — OFFICE VISIT - HEALTHEAST (OUTPATIENT)
Dept: INTERNAL MEDICINE | Facility: CLINIC | Age: 66
End: 2021-07-07

## 2021-07-07 DIAGNOSIS — Z98.890 S/P ABLATION OF ATRIAL FLUTTER: ICD-10-CM

## 2021-07-07 DIAGNOSIS — E03.9 ACQUIRED HYPOTHYROIDISM: ICD-10-CM

## 2021-07-07 DIAGNOSIS — M62.838 NECK MUSCLE SPASM: ICD-10-CM

## 2021-07-07 DIAGNOSIS — Z86.79 S/P ABLATION OF ATRIAL FLUTTER: ICD-10-CM

## 2021-07-07 DIAGNOSIS — N28.1 COMPLEX RENAL CYST: ICD-10-CM

## 2021-07-07 DIAGNOSIS — I10 ESSENTIAL HYPERTENSION: ICD-10-CM

## 2021-07-07 DIAGNOSIS — I11.9 HYPERTENSIVE HEART DISEASE WITHOUT HEART FAILURE: ICD-10-CM

## 2021-07-07 DIAGNOSIS — E55.9 VITAMIN D DEFICIENCY: ICD-10-CM

## 2021-07-07 DIAGNOSIS — E11.9 TYPE 2 DIABETES MELLITUS WITHOUT COMPLICATION, WITHOUT LONG-TERM CURRENT USE OF INSULIN (H): ICD-10-CM

## 2021-07-07 LAB
CHOLEST SERPL-MCNC: 175 MG/DL
CREAT UR-MCNC: 354 MG/DL
HBA1C MFR BLD: 6.3 %
HDLC SERPL-MCNC: 41 MG/DL
LDLC SERPL CALC-MCNC: 98 MG/DL
MICROALBUMIN UR-MCNC: 1.9 MG/DL (ref 0–1.99)
MICROALBUMIN/CREAT UR: 5.4 MG/G
TRIGL SERPL-MCNC: 180 MG/DL

## 2021-07-07 RX ORDER — CYCLOBENZAPRINE HCL 5 MG
5 TABLET ORAL 3 TIMES DAILY PRN
Qty: 20 TABLET | Refills: 0 | Status: SHIPPED | OUTPATIENT
Start: 2021-07-07 | End: 2022-03-23

## 2021-07-07 ASSESSMENT — MIFFLIN-ST. JEOR: SCORE: 1570.09

## 2021-07-07 NOTE — PROGRESS NOTES
Progress Notes by Sandra Waters MD at 7/7/2021 10:00 AM     Author: Sandra Waters MD Service: -- Author Type: Physician    Filed: 7/7/2021 10:48 AM Encounter Date: 7/7/2021 Status: Signed    : Sandra Waters MD (Physician)       [facility] Follow Up Note    Assessment/Plan:  1. Type 2 diabetes mellitus without complication, without long-term current use of insulin (H)  Glycohemoglobin at 6.3.  - Glycosylated Hemoglobin A1c  Patient averse to beginning type II diabetic medications.  She would like to continue to work on lifestyle-diet and exercise.  Return in 3 months for repeat labs  - Microalbumin, Random Urine  - Lipid [place] FASTING    2. Essential hypertension  Pressure escalation recently with associated anxiety.  Now on metoprolol 50 mg twice daily.  Blood pressures reviewed and are optimal.  Heart rate in the 60s.  Recommendation: Continue current plan.  Follow-up in 3 months.  Encourage MyChart messaging if her blood pressure should begin to increase, I could offer her guidance that may prevent ER admission or visit.  We will update echo as she had some signs of LVH on previous echo a year ago    - Echo Complete; Future  - Lipid [place] FASTING    3. Acquired hypothyroidism  Stable    4. Vitamin D deficiency  Stable    5. Complex renal cyst  History of complex renal cyst.  Due for imaging and follow-up with Dr. Redmond.  Images ordered  - CT Abdomen Without Oral With and Without IV Contrast; Future    6. S/P ablation of atrial flutter  No documented recurrence of paroxysmal A. fib or flutter.  She is on metoprolol.  She is also on Eliquis.  She has questions today regarding the watchman device.  I have explained how this could be helpful in allowing her to discontinue anticoagulation.  She is not happy with the atrial fibrillation clinic.  She would like to discuss this with a general cardiologist.  - Echo Complete; Future    7. Hypertensive heart disease without heart  failure    - Echo Complete; Future    8. Neck muscle spasm  Intermittent neck spasms on the right  - cyclobenzaprine (FLEXERIL) 5 MG tablet; Take 1 tablet (5 mg total) by mouth 3 (three) times a day as needed for muscle spasms.  Dispense: 20 tablet; Refill: 0          Sandra Waters MD    Chief Complaint:  Chief Complaint   Patient presents with   ? Follow-up   ? Hypertension       History of Present Illness:  Claudia is a 65 y.o. female who is here today for follow-up after having an emergency visit for elevated blood pressure.  She cannot identify any precipitating factors.  The notes were reviewed from her ER visit and labs.  She had a follow-up with the family physician who increased her metoprolol to 50 mg twice daily.  She has been on this without any difficulty.  She has not yet resumed exercise.  Her home blood pressure readings are reviewed and they are all under 120.  She denies any lightheadedness or dizziness.  She denies any exertional dyspnea.  She does not think she has had any recurrent atrial fib or flutter.  She has questions today about the watchman device.    She expresses some frustration with the cardiology clinic.  She does not feel they have been responsive to her calls.  She would like to have a reference for someone different.    Her diabetes is reviewed today.  She admits that she could do better with regard to diet.  She has not been exercising.    She continues to have some intermittent neck spasms and would like a muscle relaxant.      Review of Systems:  A comprehensive review of systems was performed and was otherwise negative    PFSH:  Social History: Tired and single.  She has a significant other  Social History     Tobacco Use   Smoking Status Never Smoker   Smokeless Tobacco Never Used       Past History:   Current Outpatient Medications   Medication Sig Dispense Refill   ? ELIQUIS 5 mg Tab tablet TAKE 1 TABLET BY MOUTH TWICE A DAY 60 tablet 3   ? metoprolol succinate (TOPROL-XL)  "50 MG 24 hr tablet TAKE ONE HALF TABLET ORALLY TWICE DAILY 90 tablet 2   ? multivitamin capsule Take 1 capsule by mouth daily.     ? SYNTHROID 75 mcg tablet TAKE 1 TABLET BY MOUTH ONCE DAILY AT 6AM 90 tablet 3   ? cyclobenzaprine (FLEXERIL) 5 MG tablet Take 1 tablet (5 mg total) by mouth 3 (three) times a day as needed for muscle spasms. 20 tablet 0     No current facility-administered medications for this visit.        Family History:     Physical Exam:  General Appearance:   Peers well in no acute distress  Vitals:    07/07/21 0945   BP: 114/82   Patient Site: Left Arm   Patient Position: Sitting   Cuff Size: Adult Large   Pulse: 60   SpO2: 98%   Weight: 205 lb 14.4 oz (93.4 kg)   Height: 5' 11\" (1.803 m)     Wt Readings from Last 3 Encounters:   07/07/21 205 lb 14.4 oz (93.4 kg)   06/30/21 202 lb (91.6 kg)   05/06/21 210 lb 4.8 oz (95.4 kg)     Body mass index is 28.72 kg/m .    Blood pressure is quite stable today.  Heart rate 60s    Data Review:    Analysis and Summary of Old Records (2): Detailed review of the ER and family practice records    Records Requested (1):       Other History Summarized (from other people in the room) (2):     Radiology Tests Summarized (XRAY/CT/MRI/DXA) (1):     Labs Reviewed (1): Reviewed glycohemoglobin  Medicine Tests Reviewed (EKG/ECHO/COLONOSCOPY/EGD) (1):     Independent Review of EKG or X-RAY (2):              "

## 2021-07-10 VITALS
WEIGHT: 205.9 LBS | SYSTOLIC BLOOD PRESSURE: 114 MMHG | HEART RATE: 60 BPM | BODY MASS INDEX: 28.82 KG/M2 | HEIGHT: 71 IN | OXYGEN SATURATION: 98 % | DIASTOLIC BLOOD PRESSURE: 82 MMHG

## 2021-07-11 ENCOUNTER — HEALTH MAINTENANCE LETTER (OUTPATIENT)
Age: 66
End: 2021-07-11

## 2021-07-14 DIAGNOSIS — I48.3 TYPICAL ATRIAL FLUTTER (H): ICD-10-CM

## 2021-07-17 NOTE — TELEPHONE ENCOUNTER
Routing refill request to provider for review/approval because:  Drug not on the [facility] refill protocol     Last Written Prescription Date:  3/7/21  Last Fill Quantity: 60,  # refills: 3   Last office visit provider:  7/7/21     Requested Prescriptions   Pending Prescriptions Disp Refills     ELIQUIS ANTICOAGULANT 5 MG tablet [Pharmacy Med Name: ELIQUIS 5 MG TABLET] 60 tablet 3     Sig: TAKE 1 TABLET BY MOUTH TWICE A DAY       Direct Oral Anticoagulant Agents Failed - 7/14/2021 12:01 AM        Failed - Recent (6 mo) or future (30 days) visit within the authorizing provider's specialty        Passed - Normal Platelets on file in past 12 months     Recent Labs   Lab Test 05/06/21  0251                  Passed - Medication is active on med list        Passed - Patient is 18-79 years of age        Passed - Serum creatinine less than or equal to 1.4 on file in past 12 mos     Recent Labs   Lab Test 05/06/21  0251   CR 0.75       Ok to refill medication if creatinine is low          Passed - Weight is greater than 60 kg for the past year     Wt Readings from Last 3 Encounters:   07/07/21 93.4 kg (205 lb 14.4 oz)   06/30/21 91.6 kg (202 lb)   02/09/21 94.7 kg (208 lb 11.2 oz)             Passed - No active pregnancy on record        Passed - No positive pregnancy test within past 12 months             corrine hunter RN 07/17/21 3:30 PM

## 2021-07-18 RX ORDER — APIXABAN 5 MG/1
TABLET, FILM COATED ORAL
Qty: 60 TABLET | Refills: 3 | Status: SHIPPED | OUTPATIENT
Start: 2021-07-18 | End: 2021-11-04

## 2021-07-19 ENCOUNTER — TELEPHONE (OUTPATIENT)
Dept: INTERNAL MEDICINE | Facility: CLINIC | Age: 66
End: 2021-07-19

## 2021-07-19 DIAGNOSIS — I48.3 TYPICAL ATRIAL FLUTTER (H): ICD-10-CM

## 2021-07-19 NOTE — TELEPHONE ENCOUNTER
Reason for Call:  Medication or medication refill:    Do you use a [facility]?  Name of the pharmacy and phone number for the current request:    [facility] [address]    Name of the medication requested:   New Rx - pharmacy does not have new dosage    Metroprolol 50mg 1 tab twice daily     Other request: n/a    Can we leave a detailed message on this number? YES    Phone number patient can be reached at: Home number on file [phone] (home)    Best Time: anytime    Call taken on 7/19/2021 at 2:18 PM by Sandra Garcia

## 2021-07-20 RX ORDER — METOPROLOL SUCCINATE 50 MG/1
50 TABLET, EXTENDED RELEASE ORAL 2 TIMES DAILY
Qty: 180 TABLET | Refills: 3 | Status: SHIPPED | OUTPATIENT
Start: 2021-07-20 | End: 2022-03-29

## 2021-07-27 ENCOUNTER — TRANSFERRED RECORDS (OUTPATIENT)
Dept: HEALTH INFORMATION MANAGEMENT | Facility: CLINIC | Age: 66
End: 2021-07-27

## 2021-08-05 ENCOUNTER — HOSPITAL ENCOUNTER (OUTPATIENT)
Dept: CARDIOLOGY | Facility: HOSPITAL | Age: 66
Discharge: HOME | End: 2021-08-05
Attending: INTERNAL MEDICINE | Admitting: INTERNAL MEDICINE
Payer: COMMERCIAL

## 2021-08-05 DIAGNOSIS — Z86.79 S/P ABLATION OF ATRIAL FLUTTER: ICD-10-CM

## 2021-08-05 DIAGNOSIS — I10 ESSENTIAL HYPERTENSION: ICD-10-CM

## 2021-08-05 DIAGNOSIS — I11.9 HYPERTENSIVE HEART DISEASE WITHOUT HEART FAILURE: ICD-10-CM

## 2021-08-05 DIAGNOSIS — Z98.890 S/P ABLATION OF ATRIAL FLUTTER: ICD-10-CM

## 2021-08-05 LAB — LVEF ECHO: NORMAL

## 2021-08-05 PROCEDURE — 93306 TTE W/DOPPLER COMPLETE: CPT

## 2021-08-05 PROCEDURE — 93306 TTE W/DOPPLER COMPLETE: CPT | Mod: 26 | Performed by: INTERNAL MEDICINE

## 2021-08-20 ENCOUNTER — OFFICE VISIT (OUTPATIENT)
Dept: CARDIOLOGY | Facility: CLINIC | Age: 66
End: 2021-08-20
Payer: COMMERCIAL

## 2021-08-20 VITALS
RESPIRATION RATE: 16 BRPM | DIASTOLIC BLOOD PRESSURE: 74 MMHG | SYSTOLIC BLOOD PRESSURE: 126 MMHG | BODY MASS INDEX: 28.45 KG/M2 | HEART RATE: 70 BPM | WEIGHT: 204 LBS

## 2021-08-20 DIAGNOSIS — I48.0 PAROXYSMAL ATRIAL FIBRILLATION (H): Primary | ICD-10-CM

## 2021-08-20 PROCEDURE — 99214 OFFICE O/P EST MOD 30 MIN: CPT | Performed by: INTERNAL MEDICINE

## 2021-08-20 NOTE — PROGRESS NOTES
"  HEART CARE ENCOUNTER CONSULTATON NOTE      [facility]  [phone]      Assessment/Recommendations   Assessment:  1.  Atrial flutter: Typical atrial flutter status post ablation 8/25/2020 without recurrence  2.  Atrial fibrillation: Paroxysmal atrial fibrillation reportedly during her ablation required cardioversion.  Recurrent palpitations following cardioversion without documented atrial fibrillation.  Symptoms have improved since that time.  3.  Hypertension: Well-controlled  4.  Diabetes mellitus type 2    Plan:  1.  Continue Eliquis 5 mg twice daily for anticoagulation.  She has a [id number] score of four for age, gender, hypertension and diabetes  2.  Continue current dose of Toprol both for management of hypertension and atrial fibrillation  3.  Discussed watchman device today and she is going to contact us if she reconsiders it  May follow-up in 6 months time       History of Present Illness/Subjective    HPI: Claudia Colunga is a 65 year old female with history of hypertension, diabetes mellitus type 2, typical atrial flutter status post ablation done on 8/25/2020 with atrial fibrillation status post cardioversion during procedure who is here for a follow-up.  I have not seen her for quite some time.  In the meantime she has been following with Dr. Adams and Stefan Kim in atrial fibrillation clinic.  She wanted to reestablish care.  Following her procedure she has been doing quite well however she has noticed on and off palpitations to the point where Stefan Kim was suggesting sotalol.  Patient was concerned about side effects so she decided to stay on metoprolol.  She was kept on Eliquis due to her  [id number] = 4.  She had an event monitor that did not show any recurrent atrial fibrillation.  Lately she has been doing quite well.  She feels that things have \"calmed down\".  She only has palpitations lasting for a few minutes at a time and they only occur about once or " twice a month.  She has been holding off on exercise because of her concerns about her heart.  Denies chest pain or breathing difficulty.    Echocardiogram 8/5/2021  Interpretation Summary     Left ventricular size, wall motion and function are normal. The ejection  fraction is 60-65%.  There is mild to moderate concentric left ventricular hypertrophy.  The left atrium is borderline dilated.  No significant valve abnormalities  ______________________________________________________________________________             Results for orders placed during the hospital encounter of 06/23/20   Echo Complete [ECH10] 06/23/2020     Narrative   1.Left ventricle ejection fraction is normal. The calculated left   ventricular ejection fraction is 72%.    2.Mild concentric left ventricular hypertrophy with mild left atrial   enlargement.    3.TAPSE is normal, which is consistent with normal right ventricular   systolic function.    4.No hemodynamically significant valvular heart abnormalities.    5.When compared to the previous study dated 11/15/2013, no significant   change.          Physical Examination  Review of Systems   Vitals: /74 (BP Location: Left arm, Patient Position: Sitting, Cuff Size: Adult Regular)   Pulse 70   Resp 16   Wt 92.5 kg (204 lb)   BMI 28.45 kg/m    BMI= Body mass index is 28.45 kg/m .  Wt Readings from Last 3 Encounters:   08/20/21 92.5 kg (204 lb)   07/07/21 93.4 kg (205 lb 14.4 oz)   06/30/21 91.6 kg (202 lb)       General Appearance:   no distress, normal body habitus   ENT/Mouth: membranes moist, no oral lesions or bleeding gums.      EYES:  no scleral icterus, normal conjunctivae   Neck: no carotid bruits or thyromegaly   Chest/Lungs:   lungs are clear to auscultation, no rales or wheezing   Cardiovascular:   Regular. Normal first and second heart sounds with no murmurs Jugular venous pressure normal, no edema bilaterally    Abdomen:  no organomegaly, masses, bruits, or tenderness; bowel  sounds are present   Extremities: no cyanosis or clubbing   Skin: no xanthelasma, warm.    Neurologic: normal  bilateral, no tremors     Psychiatric: alert and oriented x3, calm        Please refer above for cardiac ROS details.        Medical History  Surgical History Family History Social History   Past Medical History:   Diagnosis Date     Anxiety      Atrial flutter (H)      Diabetes mellitus, type II (H)      Eosinophilic esophagitis      Essential hypertension      Hypothyroid      Overflow incontinence      Past Surgical History:   Procedure Laterality Date     BIOPSY OF BREAST, INCISIONAL       BIOPSY OF BREAST, INCISIONAL       EP ABLATION AFLUTTER Right 8/25/2020    Procedure: EP Ablation Atrial Flutter;  Surgeon: Vick Adams MD;  Location: [facility];  Service: Cardiology     OVARIAN CYST SURGERY       Family History   Problem Relation Age of Onset     Breast Cancer Maternal Cousin      Dementia Mother 91.00     Hip fracture Mother 93.00        went to nursing home after this     Cerebrovascular Disease Mother 96.00        had to be fed by hand prior to her death     Colon Cancer Father      Aortic aneurysm Father         abdominal, repaired     Cerebral aneurysm Father      Peripheral Vascular Disease Father 96.00        ruptured femoral aneurysm?     Diabetes Type 2  Sister      Heart Disease No family hx of         Social History     Socioeconomic History     Marital status: Single     Spouse name: Not on file     Number of children: 0     Years of education: Not on file     Highest education level: Not on file   Occupational History     Not on file   Tobacco Use     Smoking status: Never Smoker     Smokeless tobacco: Never Used   Substance and Sexual Activity     Alcohol use: Yes     Comment: Alcoholic Drinks/day: rare, only with dinner out with friends     Drug use: No     Sexual activity: Not on file   Other Topics Concern     Not on file   Social History Narrative    She walks  her poodle daily for 30-60 minutes. She enjoys going to the [employer] or [employer] to paddle in the pool.     Social Determinants of Health     Financial Resource Strain:      Difficulty of Paying Living Expenses:    Food Insecurity:      Worried About Running Out of Food in the Last Year:      Ran Out of Food in the Last Year:    Transportation Needs:      Lack of Transportation (Medical):      Lack of Transportation (Non-Medical):    Physical Activity:      Days of Exercise per Week:      Minutes of Exercise per Session:    Stress:      Feeling of Stress :    Social Connections:      Frequency of Communication with Friends and Family:      Frequency of Social Gatherings with Friends and Family:      Attends [employer] Services:      Active Member of Clubs or Organizations:      Attends Club or Organization Meetings:      Marital Status:    Intimate Partner Violence:      Fear of Current or Ex-Partner:      Emotionally Abused:      Physically Abused:      Sexually Abused:            Medications  Allergies   Current Outpatient Medications   Medication Sig Dispense Refill     cyclobenzaprine (FLEXERIL) 5 MG tablet [CYCLOBENZAPRINE (FLEXERIL) 5 MG TABLET] Take 1 tablet (5 mg total) by mouth 3 (three) times a day as needed for muscle spasms. 20 tablet 0     ELIQUIS ANTICOAGULANT 5 MG tablet TAKE 1 TABLET BY MOUTH TWICE A DAY 60 tablet 3     metoprolol succinate ER (TOPROL-XL) 50 MG 24 hr tablet Take 1 tablet (50 mg) by mouth 2 times daily 180 tablet 3     multivitamin capsule [MULTIVITAMIN CAPSULE] Take 1 capsule by mouth daily.       SYNTHROID 75 mcg tablet [SYNTHROID 75 MCG TABLET] TAKE 1 TABLET BY MOUTH ONCE DAILY AT 6AM 90 tablet 3       Allergies   Allergen Reactions     Lisinopril Rash     Penicillins Rash          Lab Results    Chemistry/lipid CBC Cardiac Enzymes/BNP/TSH/INR   Recent Labs   Lab Test 07/07/21  0954   CHOL 175   HDL 41*   LDL 98   TRIG 180*     Recent Labs   Lab Test 07/07/21  0954  10/09/19  0727 11/27/18  0800   LDL 98 97 115     Recent Labs   Lab Test 05/06/21  0251      POTASSIUM 4.2   CHLORIDE 107   CO2 24   *   BUN 11   CR 0.75   GFRESTIMATED >60   AMARJIT 9.0     Recent Labs   Lab Test 05/06/21  0251 02/09/21  1322 10/06/20  1550   CR 0.75 0.77 0.82     Recent Labs   Lab Test 07/07/21  0954 02/09/21  1322 08/21/20  1024   A1C 6.3* 6.2* 6.5*          Recent Labs   Lab Test 05/06/21  0251   WBC 8.0   HGB 14.6   HCT 44.4   MCV 96        Recent Labs   Lab Test 05/06/21  0251 02/09/21  1322 10/06/20  1550   HGB 14.6 14.5 13.9    Recent Labs   Lab Test 05/06/21  0251 06/23/20  0222   TROPONINI <0.01 <0.01     No results for input(s): BNP, NTBNPI, NTBNP in the last 13009 hours.  Recent Labs   Lab Test 02/09/21  1322   TSH 1.04     No results for input(s): INR in the last 90472 hours.     Barb Paulino MD

## 2021-09-05 ENCOUNTER — HEALTH MAINTENANCE LETTER (OUTPATIENT)
Age: 66
End: 2021-09-05

## 2021-10-19 ENCOUNTER — OFFICE VISIT (OUTPATIENT)
Dept: INTERNAL MEDICINE | Facility: CLINIC | Age: 66
End: 2021-10-19
Payer: COMMERCIAL

## 2021-10-19 ENCOUNTER — OFFICE VISIT (OUTPATIENT)
Dept: PHARMACY | Facility: CLINIC | Age: 66
End: 2021-10-19
Payer: COMMERCIAL

## 2021-10-19 VITALS
HEIGHT: 71 IN | WEIGHT: 200.9 LBS | BODY MASS INDEX: 28.13 KG/M2 | DIASTOLIC BLOOD PRESSURE: 78 MMHG | HEART RATE: 70 BPM | OXYGEN SATURATION: 97 % | SYSTOLIC BLOOD PRESSURE: 124 MMHG | RESPIRATION RATE: 16 BRPM

## 2021-10-19 DIAGNOSIS — E11.9 TYPE 2 DIABETES MELLITUS WITHOUT COMPLICATION, WITHOUT LONG-TERM CURRENT USE OF INSULIN (H): ICD-10-CM

## 2021-10-19 DIAGNOSIS — I48.0 PAROXYSMAL ATRIAL FIBRILLATION (H): Primary | ICD-10-CM

## 2021-10-19 DIAGNOSIS — I10 ESSENTIAL HYPERTENSION: ICD-10-CM

## 2021-10-19 DIAGNOSIS — N28.1 RENAL CYST: ICD-10-CM

## 2021-10-19 DIAGNOSIS — E55.9 VITAMIN D DEFICIENCY: ICD-10-CM

## 2021-10-19 DIAGNOSIS — R59.1 LYMPHADENOPATHY: ICD-10-CM

## 2021-10-19 DIAGNOSIS — E11.9 TYPE 2 DIABETES MELLITUS WITHOUT COMPLICATION, WITHOUT LONG-TERM CURRENT USE OF INSULIN (H): Primary | ICD-10-CM

## 2021-10-19 LAB
ALBUMIN SERPL-MCNC: 4 G/DL (ref 3.5–5)
ALP SERPL-CCNC: 66 U/L (ref 45–120)
ALT SERPL W P-5'-P-CCNC: 25 U/L (ref 0–45)
ANION GAP SERPL CALCULATED.3IONS-SCNC: 9 MMOL/L (ref 5–18)
AST SERPL W P-5'-P-CCNC: 21 U/L (ref 0–40)
BASOPHILS # BLD AUTO: 0 10E3/UL (ref 0–0.2)
BASOPHILS NFR BLD AUTO: 1 %
BILIRUB SERPL-MCNC: 1.6 MG/DL (ref 0–1)
BUN SERPL-MCNC: 13 MG/DL (ref 8–22)
CALCIUM SERPL-MCNC: 9.3 MG/DL (ref 8.5–10.5)
CHLORIDE BLD-SCNC: 106 MMOL/L (ref 98–107)
CO2 SERPL-SCNC: 25 MMOL/L (ref 22–31)
CREAT SERPL-MCNC: 0.76 MG/DL (ref 0.6–1.1)
EOSINOPHIL # BLD AUTO: 0.3 10E3/UL (ref 0–0.7)
EOSINOPHIL NFR BLD AUTO: 5 %
ERYTHROCYTE [DISTWIDTH] IN BLOOD BY AUTOMATED COUNT: 12.3 % (ref 10–15)
GFR SERPL CREATININE-BSD FRML MDRD: 81 ML/MIN/1.73M2
GLUCOSE BLD-MCNC: 110 MG/DL (ref 70–125)
HBA1C MFR BLD: 6.4 % (ref 0–5.6)
HCT VFR BLD AUTO: 43.1 % (ref 35–47)
HGB BLD-MCNC: 14 G/DL (ref 11.7–15.7)
IMM GRANULOCYTES # BLD: 0 10E3/UL
IMM GRANULOCYTES NFR BLD: 0 %
LYMPHOCYTES # BLD AUTO: 2.2 10E3/UL (ref 0.8–5.3)
LYMPHOCYTES NFR BLD AUTO: 32 %
MCH RBC QN AUTO: 31 PG (ref 26.5–33)
MCHC RBC AUTO-ENTMCNC: 32.5 G/DL (ref 31.5–36.5)
MCV RBC AUTO: 96 FL (ref 78–100)
MONOCYTES # BLD AUTO: 0.4 10E3/UL (ref 0–1.3)
MONOCYTES NFR BLD AUTO: 7 %
NEUTROPHILS # BLD AUTO: 3.8 10E3/UL (ref 1.6–8.3)
NEUTROPHILS NFR BLD AUTO: 56 %
PLATELET # BLD AUTO: 299 10E3/UL (ref 150–450)
POTASSIUM BLD-SCNC: 4.2 MMOL/L (ref 3.5–5)
PROT SERPL-MCNC: 7.4 G/DL (ref 6–8)
RBC # BLD AUTO: 4.51 10E6/UL (ref 3.8–5.2)
SODIUM SERPL-SCNC: 140 MMOL/L (ref 136–145)
WBC # BLD AUTO: 6.7 10E3/UL (ref 4–11)

## 2021-10-19 PROCEDURE — 80053 COMPREHEN METABOLIC PANEL: CPT | Performed by: INTERNAL MEDICINE

## 2021-10-19 PROCEDURE — 99214 OFFICE O/P EST MOD 30 MIN: CPT | Performed by: INTERNAL MEDICINE

## 2021-10-19 PROCEDURE — 36415 COLL VENOUS BLD VENIPUNCTURE: CPT | Performed by: INTERNAL MEDICINE

## 2021-10-19 PROCEDURE — 85025 COMPLETE CBC W/AUTO DIFF WBC: CPT | Performed by: INTERNAL MEDICINE

## 2021-10-19 PROCEDURE — 83036 HEMOGLOBIN GLYCOSYLATED A1C: CPT | Performed by: INTERNAL MEDICINE

## 2021-10-19 PROCEDURE — 99207 PR NO CHARGE LOS: CPT | Performed by: PHARMACIST

## 2021-10-19 ASSESSMENT — MIFFLIN-ST. JEOR: SCORE: 1542.41

## 2021-10-19 NOTE — PROGRESS NOTES
[facility] Follow Up Note    Assessment/Plan:  1. Paroxysmal atrial fibrillation (H)  Ongoing concerns regarding anticoagulation and atrial fibrillation.  She is on Eliquis 5 mg twice daily.  She is [id number] #4.  She would really like to reduce her Eliquis usage as she has been in sinus rhythm for greater than 1 year.    Recommendation: Consideration for watchman device.  We will contact Dr. Rowley regarding dosage reduction-but do not see this as a viable option.    - CBC with Platelets & Differential; Future  - Comprehensive metabolic panel; Future    2. Type 2 diabetes mellitus without complication, without long-term current use of insulin (H)  We will update A1c.  On no medications-diet controlled.  She would like to explore a erik or Dexcom monitor for better feedback.  She will talk to pharmacy today  - Hemoglobin A1c; Future    3. Essential hypertension  Controlled on current medications    4. Renal cyst  We will proceed with ultrasound  - US Kidney Right; Future    5. Vitamin D deficiency      6. Lymphadenopathy  We will proceed with ultrasound for follow-up of small nodular lymph node in the left submandibular area  - US Head Neck Soft Tissue; Future          Sandra Waters MD, MD    Chief Complaint:  Chief Complaint   Patient presents with     Follow Up     Diabetes       History of Present Illness:  Claudia is a 66 year old female who is here today for follow-up with regard to her usual medical problems.  Of note, Claudia has paroxysmal atrial fibrillation.  She is status post ablation.  She had one episode of breakthrough atrial flutter but has had no recurrences.  She remains on Eliquis and is quite concerned.  She notes occasional epistaxis and bleeding from her gums.  She is wondering if she could have a dosage reduction in and Eliquis.  Of note, she denies any breakthrough arrhythmia.  She was seen in the emergency department in May with elevated blood pressure.  She has done well on the  "increased dose of metoprolol.    With regard to type 2 diabetes, she continues to work on diet and exercise.  She is currently on no medications.  Her last A1c was 6.3.  She is interested in a Dexcom or erik device.    She has concerns regarding a persistent submandibular nodule.  She also has some concerns regarding persistent symptoms of reflux.  She is wondering about her gallstone.  He has deferred her EGD until the pandemic subsides.    She will be getting a Covid booster and flu shot at her pharmacist    Review of Systems:  A comprehensive review of systems was performed and was otherwise negative    PFSH:  Social History:   History   Smoking Status     Never Smoker   Smokeless Tobacco     Never Used       Past History:   Current Outpatient Medications   Medication Sig Dispense Refill     cyclobenzaprine (FLEXERIL) 5 MG tablet [CYCLOBENZAPRINE (FLEXERIL) 5 MG TABLET] Take 1 tablet (5 mg total) by mouth 3 (three) times a day as needed for muscle spasms. 20 tablet 0     ELIQUIS ANTICOAGULANT 5 MG tablet TAKE 1 TABLET BY MOUTH TWICE A DAY 60 tablet 3     metoprolol succinate ER (TOPROL-XL) 50 MG 24 hr tablet Take 1 tablet (50 mg) by mouth 2 times daily 180 tablet 3     multivitamin capsule [MULTIVITAMIN CAPSULE] Take 1 capsule by mouth daily.       SYNTHROID 75 mcg tablet [SYNTHROID 75 MCG TABLET] TAKE 1 TABLET BY MOUTH ONCE DAILY AT 6AM 90 tablet 3       Family History:     Physical Exam:  General Appearance:   Appears quite well and in no acute distress/weight down 4 pounds  Vitals:    10/19/21 1149   BP: 124/78   BP Location: Left arm   Patient Position: Sitting   Cuff Size: Adult Large   Pulse: 70   Resp: 16   SpO2: 97%   Weight: 91.1 kg (200 lb 14.4 oz)   Height: 1.803 m (5' 11\")     Wt Readings from Last 3 Encounters:   10/19/21 91.1 kg (200 lb 14.4 oz)   08/20/21 92.5 kg (204 lb)   07/07/21 93.4 kg (205 lb 14.4 oz)     Body mass index is 28.02 kg/m .    Is clear to auscultation and percussion  Cardiac " exam reveals regular rate and rhythm with no murmurs or gallops  Extremities without edema

## 2021-10-19 NOTE — PROGRESS NOTES
Clinical Pharmacy Consult:                                                    Claudia Colunga is a 66 year old female coming in for a clinical pharmacist consult.  She was referred to me from Dr. Waters.     Reason for Consult: Freestyle erik education    Discussion: During visit with Dr. Waters today they had discussed her interest in a continuous glucose monitor, namely the freestyle erik after seeing information online.  Of note she does have Medicare insurance, and not currently treated with insulin.  She would be willing to consider paying out-of-pocket for this device.  She does not have a smart phone.  I reviewed potential ongoing cost of freestyle erik including the reader once every 3 years and 28-day supply of sensors which would cost about $45.  I showed her the freestyle erik website with videos on how to utilize the device for further insight onto whether she would like to start 1 of these devices.  Notes that she likes to eat sweets, and she believes this would be helpful to monitor blood sugars.      Plan:  1. Educate on freestyle erik continuous glucose system, she will think about this and get back to her PCP    Deshawn Durham, PharmD, BCACP  Medication Management (MTM) Pharmacist  [facility]

## 2021-10-29 ENCOUNTER — TRANSFERRED RECORDS (OUTPATIENT)
Dept: HEALTH INFORMATION MANAGEMENT | Facility: CLINIC | Age: 66
End: 2021-10-29
Payer: COMMERCIAL

## 2021-10-29 LAB — RETINOPATHY: NEGATIVE

## 2021-11-03 DIAGNOSIS — I48.3 TYPICAL ATRIAL FLUTTER (H): ICD-10-CM

## 2021-11-03 DIAGNOSIS — E03.9 HYPOTHYROIDISM: ICD-10-CM

## 2021-11-04 RX ORDER — APIXABAN 5 MG/1
TABLET, FILM COATED ORAL
Qty: 60 TABLET | Refills: 3 | Status: SHIPPED | OUTPATIENT
Start: 2021-11-04 | End: 2022-03-08

## 2021-11-04 RX ORDER — LEVOTHYROXINE SODIUM 75 UG/1
TABLET ORAL
Qty: 90 TABLET | Refills: 1 | Status: SHIPPED | OUTPATIENT
Start: 2021-11-04 | End: 2022-05-01

## 2021-11-04 NOTE — TELEPHONE ENCOUNTER
"Last Written Prescription Date:  9/21/20  Last Fill Quantity: 90,  # refills: 3   Last office visit provider:  10/19/21     Requested Prescriptions   Pending Prescriptions Disp Refills     ELIQUIS ANTICOAGULANT 5 MG tablet [Pharmacy Med Name: ELIQUIS 5 MG TABLET] 60 tablet 3     Sig: TAKE 1 TABLET BY MOUTH TWICE A DAY       Direct Oral Anticoagulant Agents Passed - 11/4/2021  8:13 AM        Passed - Normal Platelets on file in past 12 months     Recent Labs   Lab Test 10/19/21  1231                  Passed - Medication is active on med list        Passed - Patient is 18-79 years of age        Passed - Serum creatinine less than or equal to 1.4 on file in past 12 mos     Recent Labs   Lab Test 10/19/21  1231   CR 0.76       Ok to refill medication if creatinine is low          Passed - Weight is greater than 60 kg for the past year     Wt Readings from Last 3 Encounters:   10/19/21 91.1 kg (200 lb 14.4 oz)   08/20/21 92.5 kg (204 lb)   07/07/21 93.4 kg (205 lb 14.4 oz)             Passed - No active pregnancy on record        Passed - No positive pregnancy test within past 12 months        Passed - Recent (6 mo) or future (30 days) visit within the authorizing provider's specialty           levothyroxine (SYNTHROID) 75 MCG tablet 90 tablet 3       Thyroid Protocol Passed - 11/4/2021  8:13 AM        Passed - Patient is 12 years or older        Passed - Recent (12 mo) or future (30 days) visit within the authorizing provider's specialty     Patient has had an office visit with the authorizing provider or a provider within the authorizing providers department within the previous 12 mos or has a future within next 30 days. See \"Patient Info\" tab in inbasket, or \"Choose Columns\" in Meds & Orders section of the refill encounter.              Passed - Medication is active on med list        Passed - Normal TSH on file in past 12 months     Recent Labs   Lab Test 02/09/21  1322   TSH 1.04              Passed - No " active pregnancy on record     If patient is pregnant or has had a positive pregnancy test, please check TSH.          Passed - No positive pregnancy test in past 12 months     If patient is pregnant or has had a positive pregnancy test, please check TSH.               Hector Ramos RN 11/04/21 12:53 PM

## 2021-11-04 NOTE — TELEPHONE ENCOUNTER
Patient calls back to follow-up on Synthroid refill request. Patient will be out today. TC does not see that medication was order. Med is now qued.     Patient is not aware of Apixaban prescription. Is this a new prescription that Dr. Waters recently put her on? Please advise.

## 2021-11-04 NOTE — TELEPHONE ENCOUNTER
"Routing refill request to provider for review/approval because:  Anticoagulation protocol - route to provider.    Last Written Prescription Date:  7/18/21  Last Fill Quantity: 60,  # refills: 3   Last office visit provider:  10/19/21     Requested Prescriptions   Pending Prescriptions Disp Refills     ELIQUIS ANTICOAGULANT 5 MG tablet [Pharmacy Med Name: ELIQUIS 5 MG TABLET] 60 tablet 3     Sig: TAKE 1 TABLET BY MOUTH TWICE A DAY       Direct Oral Anticoagulant Agents Passed - 11/4/2021  8:13 AM        Passed - Normal Platelets on file in past 12 months     Recent Labs   Lab Test 10/19/21  1231                  Passed - Medication is active on med list        Passed - Patient is 18-79 years of age        Passed - Serum creatinine less than or equal to 1.4 on file in past 12 mos     Recent Labs   Lab Test 10/19/21  1231   CR 0.76       Ok to refill medication if creatinine is low          Passed - Weight is greater than 60 kg for the past year     Wt Readings from Last 3 Encounters:   10/19/21 91.1 kg (200 lb 14.4 oz)   08/20/21 92.5 kg (204 lb)   07/07/21 93.4 kg (205 lb 14.4 oz)             Passed - No active pregnancy on record        Passed - No positive pregnancy test within past 12 months        Passed - Recent (6 mo) or future (30 days) visit within the authorizing provider's specialty         Signed Prescriptions Disp Refills    levothyroxine (SYNTHROID) 75 MCG tablet 90 tablet 1     Sig: [SYNTHROID 75 MCG TABLET] TAKE 1 TABLET BY MOUTH ONCE DAILY AT 6AM       Thyroid Protocol Passed - 11/4/2021  8:13 AM        Passed - Patient is 12 years or older        Passed - Recent (12 mo) or future (30 days) visit within the authorizing provider's specialty     Patient has had an office visit with the authorizing provider or a provider within the authorizing providers department within the previous 12 mos or has a future within next 30 days. See \"Patient Info\" tab in inbasket, or \"Choose Columns\" in Meds & " Orders section of the refill encounter.              Passed - Medication is active on med list        Passed - Normal TSH on file in past 12 months     Recent Labs   Lab Test 02/09/21  1322   TSH 1.04              Passed - No active pregnancy on record     If patient is pregnant or has had a positive pregnancy test, please check TSH.          Passed - No positive pregnancy test in past 12 months     If patient is pregnant or has had a positive pregnancy test, please check TSH.               Hector Ramos RN 11/04/21 12:54 PM

## 2021-11-16 ENCOUNTER — HOSPITAL ENCOUNTER (OUTPATIENT)
Dept: ULTRASOUND IMAGING | Facility: HOSPITAL | Age: 66
End: 2021-11-16
Attending: INTERNAL MEDICINE
Payer: COMMERCIAL

## 2021-11-16 DIAGNOSIS — R59.1 LYMPHADENOPATHY: ICD-10-CM

## 2021-11-16 DIAGNOSIS — N28.1 RENAL CYST: ICD-10-CM

## 2021-11-16 PROCEDURE — 76775 US EXAM ABDO BACK WALL LIM: CPT

## 2021-11-16 PROCEDURE — 76536 US EXAM OF HEAD AND NECK: CPT

## 2022-03-06 DIAGNOSIS — I48.3 TYPICAL ATRIAL FLUTTER (H): ICD-10-CM

## 2022-03-08 RX ORDER — APIXABAN 5 MG/1
TABLET, FILM COATED ORAL
Qty: 60 TABLET | Refills: 3 | Status: SHIPPED | OUTPATIENT
Start: 2022-03-08 | End: 2022-07-08

## 2022-03-08 NOTE — TELEPHONE ENCOUNTER
Routing refill request to provider for review/approval because:  Anticoagulant.  Needs PCP review.    Last Written Prescription Date:  11/04/2021  Last Fill Quantity: 60,  # refills: 3   Last office visit provider:   10/19/2021 with PCP.    Requested Prescriptions   Pending Prescriptions Disp Refills     ELIQUIS ANTICOAGULANT 5 MG tablet [Pharmacy Med Name: ELIQUIS 5 MG TABLET] 60 tablet 3     Sig: TAKE 1 TABLET BY MOUTH TWICE A DAY       Direct Oral Anticoagulant Agents Passed - 3/6/2022  6:57 AM        Passed - Normal Platelets on file in past 12 months     Recent Labs   Lab Test 10/19/21  1231                  Passed - Medication is active on med list        Passed - Patient is 18-79 years of age        Passed - Serum creatinine less than or equal to 1.4 on file in past 12 mos     Recent Labs   Lab Test 10/19/21  1231   CR 0.76       Ok to refill medication if creatinine is low          Passed - Weight is greater than 60 kg for the past year     Wt Readings from Last 3 Encounters:   10/19/21 91.1 kg (200 lb 14.4 oz)   08/20/21 92.5 kg (204 lb)   07/07/21 93.4 kg (205 lb 14.4 oz)             Passed - No active pregnancy on record        Passed - No positive pregnancy test within past 12 months        Passed - Recent (6 mo) or future (30 days) visit within the authorizing provider's specialty             Gabriela Domínguez 03/07/22 11:01 PM

## 2022-03-09 ENCOUNTER — TELEPHONE (OUTPATIENT)
Dept: CARDIOLOGY | Facility: CLINIC | Age: 67
End: 2022-03-09

## 2022-03-23 ENCOUNTER — OFFICE VISIT (OUTPATIENT)
Dept: INTERNAL MEDICINE | Facility: CLINIC | Age: 67
End: 2022-03-23
Payer: COMMERCIAL

## 2022-03-23 ENCOUNTER — ANCILLARY PROCEDURE (OUTPATIENT)
Dept: MAMMOGRAPHY | Facility: CLINIC | Age: 67
End: 2022-03-23
Attending: INTERNAL MEDICINE
Payer: COMMERCIAL

## 2022-03-23 VITALS
DIASTOLIC BLOOD PRESSURE: 88 MMHG | TEMPERATURE: 97.9 F | SYSTOLIC BLOOD PRESSURE: 126 MMHG | HEIGHT: 71 IN | WEIGHT: 206.56 LBS | BODY MASS INDEX: 28.92 KG/M2 | HEART RATE: 70 BPM | RESPIRATION RATE: 16 BRPM | OXYGEN SATURATION: 96 %

## 2022-03-23 DIAGNOSIS — I10 ESSENTIAL HYPERTENSION: ICD-10-CM

## 2022-03-23 DIAGNOSIS — E03.9 ACQUIRED HYPOTHYROIDISM: ICD-10-CM

## 2022-03-23 DIAGNOSIS — R09.82 POSTNASAL DRIP: ICD-10-CM

## 2022-03-23 DIAGNOSIS — I48.0 PAROXYSMAL ATRIAL FIBRILLATION (H): ICD-10-CM

## 2022-03-23 DIAGNOSIS — Z12.31 VISIT FOR SCREENING MAMMOGRAM: ICD-10-CM

## 2022-03-23 DIAGNOSIS — E11.9 TYPE 2 DIABETES MELLITUS WITHOUT COMPLICATION, WITHOUT LONG-TERM CURRENT USE OF INSULIN (H): Primary | ICD-10-CM

## 2022-03-23 DIAGNOSIS — H61.22 IMPACTED CERUMEN OF LEFT EAR: ICD-10-CM

## 2022-03-23 LAB
ANION GAP SERPL CALCULATED.3IONS-SCNC: 10 MMOL/L (ref 5–18)
BUN SERPL-MCNC: 13 MG/DL (ref 8–22)
CALCIUM SERPL-MCNC: 9.3 MG/DL (ref 8.5–10.5)
CHLORIDE BLD-SCNC: 106 MMOL/L (ref 98–107)
CO2 SERPL-SCNC: 25 MMOL/L (ref 22–31)
CREAT SERPL-MCNC: 0.67 MG/DL (ref 0.6–1.1)
GFR SERPL CREATININE-BSD FRML MDRD: >90 ML/MIN/1.73M2
GLUCOSE BLD-MCNC: 106 MG/DL (ref 70–125)
HBA1C MFR BLD: 6.2 % (ref 0–5.6)
HOLD SPECIMEN: NORMAL
POTASSIUM BLD-SCNC: 4.4 MMOL/L (ref 3.5–5)
SODIUM SERPL-SCNC: 141 MMOL/L (ref 136–145)
TSH SERPL DL<=0.005 MIU/L-ACNC: 1.78 UIU/ML (ref 0.3–5)

## 2022-03-23 PROCEDURE — 77067 SCR MAMMO BI INCL CAD: CPT | Mod: TC | Performed by: RADIOLOGY

## 2022-03-23 PROCEDURE — 80048 BASIC METABOLIC PNL TOTAL CA: CPT | Performed by: INTERNAL MEDICINE

## 2022-03-23 PROCEDURE — 84443 ASSAY THYROID STIM HORMONE: CPT | Performed by: INTERNAL MEDICINE

## 2022-03-23 PROCEDURE — 83036 HEMOGLOBIN GLYCOSYLATED A1C: CPT | Performed by: INTERNAL MEDICINE

## 2022-03-23 PROCEDURE — 99214 OFFICE O/P EST MOD 30 MIN: CPT | Mod: 25 | Performed by: INTERNAL MEDICINE

## 2022-03-23 PROCEDURE — 69210 REMOVE IMPACTED EAR WAX UNI: CPT | Performed by: INTERNAL MEDICINE

## 2022-03-23 PROCEDURE — 36415 COLL VENOUS BLD VENIPUNCTURE: CPT | Performed by: INTERNAL MEDICINE

## 2022-03-23 RX ORDER — HYDROCHLOROTHIAZIDE 12.5 MG/1
12.5 CAPSULE ORAL DAILY
Qty: 90 CAPSULE | Refills: 1 | Status: SHIPPED | OUTPATIENT
Start: 2022-03-23 | End: 2022-06-23

## 2022-03-23 NOTE — PROGRESS NOTES
[facility] Follow Up Note    Assessment/Plan:  1. Type 2 diabetes mellitus without complication, without long-term current use of insulin (H)  Glycohemoglobin at 6.2!!!!-At goal without medications.  Recommendations: Would like to add a statin medication per ADA guidelines.  However, patient continues to defer.  We will encourage moving forward.  Encouraged regular exercise.    - Hemoglobin A1c (aka HBA1C)  - Extra Tube  - Basic metabolic panel  (Ca, Cl, CO2, Creat, Gluc, K, Na, BUN); Future  - Basic metabolic panel  (Ca, Cl, CO2, Creat, Gluc, K, Na, BUN)    2. Essential hypertension  Slight suboptimal control with elevated diastolic blood pressures.  We will add low-dose Microzide.  Have encouraged her to message me with updated blood pressures next week.  - hydrochlorothiazide (MICROZIDE) 12.5 MG capsule; Take 1 capsule (12.5 mg) by mouth daily  Dispense: 90 capsule; Refill: 1    3. Paroxysmal atrial fibrillation (H)  Stable-normal sinus rhythm today.  Meeting with cardiology next week.  [id number] score equals 4.  She is on Eliquis.  She is contemplating a watchman procedure as she hates being on medication.    Of note, as above, continue to encourage statin use in the setting of diabetes-but she declines.    4. Acquired hypothyroidism  We will update labs  - TSH with free T4 reflex; Future  - TSH with free T4 reflex    5. Impacted cerumen of left ear    - REMOVE IMPACTED CERUMEN    6 cough/postnasal drip  Recommend:  Ya pot or sinus irrigation followed by Flonase at bedtime for 3 to 4 weeks.    Follow-up in 3 to 6 months    Sandra Waters MD, MD    Chief Complaint:  Chief Complaint   Patient presents with     Diabetes     Cerumen (impacted)       History of Present Illness:  Claudia is a 66 year old female who is here today for discussion of her variety of issues.  First, she is here today for follow-up with regard to type 2 diabetes.  She states that she has been working with reducing sugar  and sweet intake.  She expresses some interest in perhaps having a erik or Dexcom on continuous monitor if possible.  She is aware that she would need to have a smart phone/.  It would not be covered by insurance-she will contemplate it least by me a monitor for 2 to 4 weeks.  She denies any low blood sugar events or any polyuria or polydipsia.    She does admit that her exercise has fallen off with the pandemic, etc.    Additionally, she continues to have postnasal drip.  She coughs up a small amount of mucus.  She has had negative x-ray in the past/etc.    She has paroxysmal atrial fibrillation.  She is status post ablation.  She is on Eliquis but would like to be off medication.  She has received information about the watchman device.  She will talk to Dr. Rowley about it.  She states that she will also talk to Dr. Rowley about statin use which I have long encourage.  She is a type II diabetic but has not wanted to be on lipid-lowering agents as she is somewhat medication averse.    She has cerumen in her ears.    Home blood pressures are reviewed.  They are borderline.  She denies any extensive tachycardic of months    Review of Systems:  A comprehensive review of systems was performed and was otherwise negative    PFSH:  Social History: She is single but has a significant other  History   Smoking Status     Never Smoker   Smokeless Tobacco     Never Used       Past History:   Current Outpatient Medications   Medication Sig Dispense Refill     ELIQUIS ANTICOAGULANT 5 MG tablet TAKE 1 TABLET BY MOUTH TWICE A DAY 60 tablet 3     hydrochlorothiazide (MICROZIDE) 12.5 MG capsule Take 1 capsule (12.5 mg) by mouth daily 90 capsule 1     levothyroxine (SYNTHROID) 75 MCG tablet [SYNTHROID 75 MCG TABLET] TAKE 1 TABLET BY MOUTH ONCE DAILY AT 6AM 90 tablet 1     metoprolol succinate ER (TOPROL-XL) 50 MG 24 hr tablet Take 1 tablet (50 mg) by mouth 2 times daily 180 tablet 3     multivitamin capsule [MULTIVITAMIN CAPSULE] Take 1  "capsule by mouth daily.         Family History:     Physical Exam:  General Appearance:   She appears quite well and healthy and in no acute distress.  Weight up about 6 pounds  Vitals:    03/23/22 1031   BP: 126/88   BP Location: Left arm   Patient Position: Sitting   Cuff Size: Adult Regular   Pulse: 70   Resp: 16   Temp: 97.9  F (36.6  C)   TempSrc: Tympanic   SpO2: 96%   Weight: 93.7 kg (206 lb 9 oz)   Height: 1.803 m (5' 11\")     Wt Readings from Last 3 Encounters:   03/23/22 93.7 kg (206 lb 9 oz)   10/19/21 91.1 kg (200 lb 14.4 oz)   08/20/21 92.5 kg (204 lb)     Body mass index is 28.81 kg/m .    Head neck exam is performed.  Cerumen noted in the left ear  Neck is supple  Lungs are clear to auscultation and percussion  Cardiac exam reveals a regular rate and rhythm today  Extremities are examined and there is no edema.  Some superficial varicosities are noted      "

## 2022-03-27 DIAGNOSIS — I48.3 TYPICAL ATRIAL FLUTTER (H): ICD-10-CM

## 2022-03-29 RX ORDER — METOPROLOL SUCCINATE 50 MG/1
TABLET, EXTENDED RELEASE ORAL
Qty: 90 TABLET | Refills: 2 | Status: SHIPPED | OUTPATIENT
Start: 2022-03-29 | End: 2022-06-23

## 2022-03-29 NOTE — TELEPHONE ENCOUNTER
"Routing refill request to provider for review/approval because:  Drug not active on patient's medication list - Instructions on requested Rx are different than last ordered    Last Written Prescription Date:  7/20/2021  Last Fill Quantity: 180,  # refills: 3   Last office visit provider:  3/23/2022        metoprolol succinate ER (TOPROL-XL) 50 MG 24 hr tablet  50 mg, 2 TIMES DAILY 3 ordered  Edit       Summary: Take 1 tablet (50 mg) by mouth 2 times daily, Disp-180 tablet, R-3, E-Prescribe     Dose, Route, Frequency: 50 mg, Oral, 2 TIMES DAILY    Start: 7/20/2021    Ord/Sold: 7/20/2021 (O)      Report    Adh:     Taking:     Long-term:       Pharmacy: [facility]/pharmacy #[id number] - [facility], [place] - [address] [place] [address]    Med Dose History         Patient Sig: Take 1 tablet (50 mg) by mouth 2 times daily       Ordered on: 7/20/2021       Authorized by: DEVIN MONROY       Dispense: 180 tablet            Requested Prescriptions   Pending Prescriptions Disp Refills     metoprolol succinate ER (TOPROL-XL) 50 MG 24 hr tablet [Pharmacy Med Name: METOPROLOL SUCC ER 50 MG TAB] 90 tablet 2     Sig: TAKE ONE HALF TABLET BY MOUTH TWICE A DAY       Beta-Blockers Protocol Passed - 3/27/2022  7:17 AM        Passed - Blood pressure under 140/90 in past 12 months     BP Readings from Last 3 Encounters:   03/23/22 126/88   10/19/21 124/78   08/20/21 126/74                 Passed - Patient is age 6 or older        Passed - Recent (12 mo) or future (30 days) visit within the authorizing provider's specialty     Patient has had an office visit with the authorizing provider or a provider within the authorizing providers department within the previous 12 mos or has a future within next 30 days. See \"Patient Info\" tab in inbasket, or \"Choose Columns\" in Meds & Orders section of the refill encounter.              Passed - Medication is active on med list             Marbella Reed RN 03/29/22 2:20 PM  "

## 2022-04-28 ENCOUNTER — OFFICE VISIT (OUTPATIENT)
Dept: CARDIOLOGY | Facility: CLINIC | Age: 67
End: 2022-04-28
Attending: INTERNAL MEDICINE
Payer: COMMERCIAL

## 2022-04-28 VITALS
DIASTOLIC BLOOD PRESSURE: 86 MMHG | WEIGHT: 210 LBS | BODY MASS INDEX: 29.4 KG/M2 | HEART RATE: 72 BPM | RESPIRATION RATE: 16 BRPM | HEIGHT: 71 IN | SYSTOLIC BLOOD PRESSURE: 128 MMHG

## 2022-04-28 DIAGNOSIS — E03.9 HYPOTHYROIDISM: ICD-10-CM

## 2022-04-28 DIAGNOSIS — I48.0 PAROXYSMAL ATRIAL FIBRILLATION (H): ICD-10-CM

## 2022-04-28 PROCEDURE — 99214 OFFICE O/P EST MOD 30 MIN: CPT | Performed by: INTERNAL MEDICINE

## 2022-04-28 NOTE — LETTER
4/28/2022    Sandra Waters MD  [address] [facility] [place] [address]    RE: Claudia Colunga       Dear Colleague,     I had the pleasure of seeing Claudia Colunga in the [facility].    HEART CARE ENCOUNTER CONSULTATON NOTE      [facility]  [phone]      Assessment/Recommendations   Assessment:  1.  Atrial flutter: Typical atrial flutter status post ablation 8/25/2020 without recurrence  2.  Atrial fibrillation: Paroxysmal atrial fibrillation reportedly during her ablation required cardioversion.  Recurrent palpitations following cardioversion without documented atrial fibrillation.  Symptoms now improved.  Obtain a Kardia mobile device.  3.  Hypertension: Has been mildly elevated and recently started on a low-dose of hydrochlorothiazide  4.  Diabetes mellitus type 2     Plan:  1.  Continue Eliquis 5 mg twice daily for anticoagulation.  She has a [id number] score of four for age, gender, hypertension and diabetes  2.  Continue current dose of Toprol both for management of hypertension and atrial fibrillation  3.  Discussed watchman device today and she would like referral to A. fib clinic to discuss it further    May follow-up in 1 year          History of Present Illness/Subjective    HPI: Claudia Colunga is a 66 year old female with history of hypertension, diabetes mellitus type 2, typical atrial flutter status post ablation done on 8/25/2020 with atrial fibrillation status post cardioversion during procedure who is here for a follow-up.  Since the procedure she has noted recurrent palpitations.  She had increased palpitations initially and A. fib clinic suggested sotalol.  She never try sotalol and the palpitations have in fact significantly improved.  She experiences them about twice a month and a last for less than a minute.  She is considering obtaining a Kardia mobile device for monitoring her atrial fibrillation.  She notices some bleeding in her gums at  "times.  And is wondering if she can come off of anticoagulation.  Denies chest pain or breathing difficulty.     Echocardiogram 8/5/2021  Interpretation Summary     Left ventricular size, wall motion and function are normal. The ejection  fraction is 60-65%.  There is mild to moderate concentric left ventricular hypertrophy.  The left atrium is borderline dilated.  No significant valve abnormalities       Physical Examination  Review of Systems   Vitals: /86 (BP Location: Left arm, Patient Position: Sitting, Cuff Size: Adult Large)   Pulse 72   Resp 16   Ht 1.803 m (5' 11\")   Wt 95.3 kg (210 lb)   BMI 29.29 kg/m    BMI= Body mass index is 29.29 kg/m .  Wt Readings from Last 3 Encounters:   04/28/22 95.3 kg (210 lb)   03/23/22 93.7 kg (206 lb 9 oz)   10/19/21 91.1 kg (200 lb 14.4 oz)       General Appearance:   no distress, normal body habitus   ENT/Mouth: membranes moist, no oral lesions or bleeding gums.      EYES:  no scleral icterus, normal conjunctivae   Neck: no carotid bruits or thyromegaly   Chest/Lungs:   lungs are clear to auscultation, no rales or wheezing   Cardiovascular:   Regular. Normal first and second heart sounds with no murmurs, rubs, or gallops; the carotid, radial and posterior tibial pulses are intact,  no edema bilaterally    Abdomen:  no organomegaly, masses, bruits, or tenderness; bowel sounds are present   Extremities: no cyanosis or clubbing   Skin: no xanthelasma, warm.    Neurologic: normal  bilateral, no tremors     Psychiatric: alert and oriented x3, calm        Please refer above for cardiac ROS details.        Medical History  Surgical History Family History Social History   Past Medical History:   Diagnosis Date     Anxiety      Atrial flutter (H)      Diabetes mellitus, type II (H)      Eosinophilic esophagitis      Essential hypertension      Hypothyroid      Overflow incontinence      Past Surgical History:   Procedure Laterality Date     BIOPSY OF BREAST, " INCISIONAL       BIOPSY OF BREAST, INCISIONAL       EP ABLATION AFLUTTER Right 8/25/2020    Procedure: EP Ablation Atrial Flutter;  Surgeon: Vick Adams MD;  Location: [facility];  Service: Cardiology     OVARIAN CYST SURGERY       Family History   Problem Relation Age of Onset     Breast Cancer Maternal Cousin      Dementia Mother 91.00     Hip fracture Mother 93.00        went to nursing home after this     Cerebrovascular Disease Mother 96.00        had to be fed by hand prior to her death     Colon Cancer Father      Aortic aneurysm Father         abdominal, repaired     Cerebral aneurysm Father      Peripheral Vascular Disease Father 96.00        ruptured femoral aneurysm?     Diabetes Type 2  Sister      Heart Disease No family hx of         Social History     Socioeconomic History     Marital status: Single     Spouse name: Not on file     Number of children: 0     Years of education: Not on file     Highest education level: Not on file   Occupational History     Not on file   Tobacco Use     Smoking status: Never Smoker     Smokeless tobacco: Never Used   Substance and Sexual Activity     Alcohol use: Yes     Comment: Alcoholic Drinks/day: rare, only with dinner out with friends     Drug use: No     Sexual activity: Not on file   Other Topics Concern     Not on file   Social History Narrative    She walks her poodle daily for 30-60 minutes. She enjoys going to the [employer] or [employer] to paddle in the pool.     Social Determinants of Health     Financial Resource Strain: Not on file   Food Insecurity: Not on file   Transportation Needs: Not on file   Physical Activity: Not on file   Stress: Not on file   Social Connections: Not on file   Intimate Partner Violence: Not on file   Housing Stability: Not on file           Medications  Allergies   Current Outpatient Medications   Medication Sig Dispense Refill     Cholecalciferol (VITAMIN D3 PO) Take 1 capsule by mouth daily       ELIQUIS  ANTICOAGULANT 5 MG tablet TAKE 1 TABLET BY MOUTH TWICE A DAY 60 tablet 3     levothyroxine (SYNTHROID) 75 MCG tablet [SYNTHROID 75 MCG TABLET] TAKE 1 TABLET BY MOUTH ONCE DAILY AT 6AM 90 tablet 1     metoprolol succinate ER (TOPROL-XL) 50 MG 24 hr tablet TAKE ONE HALF TABLET BY MOUTH TWICE A DAY (Patient taking differently: Take 50 mg by mouth 2 times daily) 90 tablet 2     multivitamin capsule [MULTIVITAMIN CAPSULE] Take 1 capsule by mouth daily.       hydrochlorothiazide (MICROZIDE) 12.5 MG capsule Take 1 capsule (12.5 mg) by mouth daily 90 capsule 1       Allergies   Allergen Reactions     Lisinopril Rash     Penicillins Rash          Lab Results    Chemistry/lipid CBC Cardiac Enzymes/BNP/TSH/INR   Recent Labs   Lab Test 07/07/21  0954   CHOL 175   HDL 41*   LDL 98   TRIG 180*     Recent Labs   Lab Test 07/07/21  0954 10/09/19  0727 11/27/18  0800   LDL 98 97 115     Recent Labs   Lab Test 03/23/22  1042      POTASSIUM 4.4   CHLORIDE 106   CO2 25      BUN 13   CR 0.67   GFRESTIMATED >90   AMARJIT 9.3     Recent Labs   Lab Test 03/23/22  1042 10/19/21  1231 05/06/21  0251   CR 0.67 0.76 0.75     Recent Labs   Lab Test 03/23/22  1042 10/19/21  1231 07/07/21  0954   A1C 6.2* 6.4* 6.3*          Recent Labs   Lab Test 10/19/21  1231   WBC 6.7   HGB 14.0   HCT 43.1   MCV 96        Recent Labs   Lab Test 10/19/21  1231 05/06/21  0251 02/09/21  1322   HGB 14.0 14.6 14.5    Recent Labs   Lab Test 05/06/21  0251 06/23/20  0222   TROPONINI <0.01 <0.01     No results for input(s): BNP, NTBNPI, NTBNP in the last 68783 hours.  Recent Labs   Lab Test 03/23/22  1042   TSH 1.78     No results for input(s): INR in the last 89792 hours.     Barb Paulino MD                Thank you for allowing me to participate in the care of your patient.      Sincerely,     Barb Paulino MD     [facility]  cc:   Barb Paulino MD  [address]  [place] [address]

## 2022-04-28 NOTE — PROGRESS NOTES
HEART CARE ENCOUNTER CONSULTATON NOTE      [facility]  [phone]      Assessment/Recommendations   Assessment:  1.  Atrial flutter: Typical atrial flutter status post ablation 8/25/2020 without recurrence  2.  Atrial fibrillation: Paroxysmal atrial fibrillation reportedly during her ablation required cardioversion.  Recurrent palpitations following cardioversion without documented atrial fibrillation.  Symptoms now improved.  Obtain a Kardia mobile device.  3.  Hypertension: Has been mildly elevated and recently started on a low-dose of hydrochlorothiazide  4.  Diabetes mellitus type 2     Plan:  1.  Continue Eliquis 5 mg twice daily for anticoagulation.  She has a [id number] score of four for age, gender, hypertension and diabetes  2.  Continue current dose of Toprol both for management of hypertension and atrial fibrillation  3.  Discussed watchman device today and she would like referral to A. fib clinic to discuss it further    May follow-up in 1 year          History of Present Illness/Subjective    HPI: Claudia Colunga is a 66 year old female with history of hypertension, diabetes mellitus type 2, typical atrial flutter status post ablation done on 8/25/2020 with atrial fibrillation status post cardioversion during procedure who is here for a follow-up.  Since the procedure she has noted recurrent palpitations.  She had increased palpitations initially and A. fib clinic suggested sotalol.  She never try sotalol and the palpitations have in fact significantly improved.  She experiences them about twice a month and a last for less than a minute.  She is considering obtaining a Kardia mobile device for monitoring her atrial fibrillation.  She notices some bleeding in her gums at times.  And is wondering if she can come off of anticoagulation.  Denies chest pain or breathing difficulty.     Echocardiogram 8/5/2021  Interpretation Summary     Left ventricular size, wall motion and  "function are normal. The ejection  fraction is 60-65%.  There is mild to moderate concentric left ventricular hypertrophy.  The left atrium is borderline dilated.  No significant valve abnormalities       Physical Examination  Review of Systems   Vitals: /86 (BP Location: Left arm, Patient Position: Sitting, Cuff Size: Adult Large)   Pulse 72   Resp 16   Ht 1.803 m (5' 11\")   Wt 95.3 kg (210 lb)   BMI 29.29 kg/m    BMI= Body mass index is 29.29 kg/m .  Wt Readings from Last 3 Encounters:   04/28/22 95.3 kg (210 lb)   03/23/22 93.7 kg (206 lb 9 oz)   10/19/21 91.1 kg (200 lb 14.4 oz)       General Appearance:   no distress, normal body habitus   ENT/Mouth: membranes moist, no oral lesions or bleeding gums.      EYES:  no scleral icterus, normal conjunctivae   Neck: no carotid bruits or thyromegaly   Chest/Lungs:   lungs are clear to auscultation, no rales or wheezing   Cardiovascular:   Regular. Normal first and second heart sounds with no murmurs, rubs, or gallops; the carotid, radial and posterior tibial pulses are intact,  no edema bilaterally    Abdomen:  no organomegaly, masses, bruits, or tenderness; bowel sounds are present   Extremities: no cyanosis or clubbing   Skin: no xanthelasma, warm.    Neurologic: normal  bilateral, no tremors     Psychiatric: alert and oriented x3, calm        Please refer above for cardiac ROS details.        Medical History  Surgical History Family History Social History   Past Medical History:   Diagnosis Date     Anxiety      Atrial flutter (H)      Diabetes mellitus, type II (H)      Eosinophilic esophagitis      Essential hypertension      Hypothyroid      Overflow incontinence      Past Surgical History:   Procedure Laterality Date     BIOPSY OF BREAST, INCISIONAL       BIOPSY OF BREAST, INCISIONAL       EP ABLATION AFLUTTER Right 8/25/2020    Procedure: EP Ablation Atrial Flutter;  Surgeon: Vick Adams MD;  Location: [facility];  Service: " Cardiology     OVARIAN CYST SURGERY       Family History   Problem Relation Age of Onset     Breast Cancer Maternal Cousin      Dementia Mother 91.00     Hip fracture Mother 93.00        went to nursing home after this     Cerebrovascular Disease Mother 96.00        had to be fed by hand prior to her death     Colon Cancer Father      Aortic aneurysm Father         abdominal, repaired     Cerebral aneurysm Father      Peripheral Vascular Disease Father 96.00        ruptured femoral aneurysm?     Diabetes Type 2  Sister      Heart Disease No family hx of         Social History     Socioeconomic History     Marital status: Single     Spouse name: Not on file     Number of children: 0     Years of education: Not on file     Highest education level: Not on file   Occupational History     Not on file   Tobacco Use     Smoking status: Never Smoker     Smokeless tobacco: Never Used   Substance and Sexual Activity     Alcohol use: Yes     Comment: Alcoholic Drinks/day: rare, only with dinner out with friends     Drug use: No     Sexual activity: Not on file   Other Topics Concern     Not on file   Social History Narrative    She walks her poodle daily for 30-60 minutes. She enjoys going to the [email or web address] to paddle in the pool.     Social Determinants of Health     Financial Resource Strain: Not on file   Food Insecurity: Not on file   Transportation Needs: Not on file   Physical Activity: Not on file   Stress: Not on file   Social Connections: Not on file   Intimate Partner Violence: Not on file   Housing Stability: Not on file           Medications  Allergies   Current Outpatient Medications   Medication Sig Dispense Refill     Cholecalciferol (VITAMIN D3 PO) Take 1 capsule by mouth daily       ELIQUIS ANTICOAGULANT 5 MG tablet TAKE 1 TABLET BY MOUTH TWICE A DAY 60 tablet 3     levothyroxine (SYNTHROID) 75 MCG tablet [SYNTHROID 75 MCG TABLET] TAKE 1 TABLET BY MOUTH ONCE DAILY AT 6AM 90 tablet 1      metoprolol succinate ER (TOPROL-XL) 50 MG 24 hr tablet TAKE ONE HALF TABLET BY MOUTH TWICE A DAY (Patient taking differently: Take 50 mg by mouth 2 times daily) 90 tablet 2     multivitamin capsule [MULTIVITAMIN CAPSULE] Take 1 capsule by mouth daily.       hydrochlorothiazide (MICROZIDE) 12.5 MG capsule Take 1 capsule (12.5 mg) by mouth daily 90 capsule 1       Allergies   Allergen Reactions     Lisinopril Rash     Penicillins Rash          Lab Results    Chemistry/lipid CBC Cardiac Enzymes/BNP/TSH/INR   Recent Labs   Lab Test 07/07/21  0954   CHOL 175   HDL 41*   LDL 98   TRIG 180*     Recent Labs   Lab Test 07/07/21  0954 10/09/19  0727 11/27/18  0800   LDL 98 97 115     Recent Labs   Lab Test 03/23/22  1042      POTASSIUM 4.4   CHLORIDE 106   CO2 25      BUN 13   CR 0.67   GFRESTIMATED >90   AMARJIT 9.3     Recent Labs   Lab Test 03/23/22  1042 10/19/21  1231 05/06/21  0251   CR 0.67 0.76 0.75     Recent Labs   Lab Test 03/23/22  1042 10/19/21  1231 07/07/21  0954   A1C 6.2* 6.4* 6.3*          Recent Labs   Lab Test 10/19/21  1231   WBC 6.7   HGB 14.0   HCT 43.1   MCV 96        Recent Labs   Lab Test 10/19/21  1231 05/06/21  0251 02/09/21  1322   HGB 14.0 14.6 14.5    Recent Labs   Lab Test 05/06/21  0251 06/23/20  0222   TROPONINI <0.01 <0.01     No results for input(s): BNP, NTBNPI, NTBNP in the last 17705 hours.  Recent Labs   Lab Test 03/23/22  1042   TSH 1.78     No results for input(s): INR in the last 85280 hours.     Barb Paulino MD

## 2022-04-29 ENCOUNTER — TELEPHONE (OUTPATIENT)
Dept: CARDIOLOGY | Facility: CLINIC | Age: 67
End: 2022-04-29
Payer: COMMERCIAL

## 2022-04-29 NOTE — CONFIDENTIAL NOTE
[facility] referral from Dr. Paulino 4/28.    Please call the pt and schedule [facility] consult.    Thanks,  Leana

## 2022-05-01 RX ORDER — LEVOTHYROXINE SODIUM 75 UG/1
TABLET ORAL
Qty: 90 TABLET | Refills: 3 | Status: SHIPPED | OUTPATIENT
Start: 2022-05-01 | End: 2023-04-25

## 2022-05-01 NOTE — TELEPHONE ENCOUNTER
"Last Written Prescription Date:  11/4/21  Last Fill Quantity: 90,  # refills: 1   Last office visit provider:  3/23/22     Requested Prescriptions   Pending Prescriptions Disp Refills     levothyroxine (SYNTHROID/LEVOTHROID) 75 MCG tablet [Pharmacy Med Name: LEVOTHYROXINE 75 MCG TABLET] 90 tablet 1     Sig: TAKE 1 TABLET BY MOUTH ONCE DAILY AT 6AM       Thyroid Protocol Passed - 4/28/2022 12:02 AM        Passed - Patient is 12 years or older        Passed - Recent (12 mo) or future (30 days) visit within the authorizing provider's specialty     Patient has had an office visit with the authorizing provider or a provider within the authorizing providers department within the previous 12 mos or has a future within next 30 days. See \"Patient Info\" tab in inbasket, or \"Choose Columns\" in Meds & Orders section of the refill encounter.              Passed - Medication is active on med list        Passed - Normal TSH on file in past 12 months     Recent Labs   Lab Test 03/23/22  1042   TSH 1.78              Passed - No active pregnancy on record     If patient is pregnant or has had a positive pregnancy test, please check TSH.          Passed - No positive pregnancy test in past 12 months     If patient is pregnant or has had a positive pregnancy test, please check TSH.               Sintia Peres RN 05/01/22 9:02 AM    "

## 2022-06-23 ENCOUNTER — OFFICE VISIT (OUTPATIENT)
Dept: CARDIOLOGY | Facility: CLINIC | Age: 67
End: 2022-06-23
Payer: COMMERCIAL

## 2022-06-23 VITALS
WEIGHT: 208.4 LBS | BODY MASS INDEX: 29.18 KG/M2 | DIASTOLIC BLOOD PRESSURE: 84 MMHG | SYSTOLIC BLOOD PRESSURE: 132 MMHG | HEIGHT: 71 IN | RESPIRATION RATE: 12 BRPM | HEART RATE: 84 BPM

## 2022-06-23 DIAGNOSIS — I48.3 TYPICAL ATRIAL FLUTTER (H): ICD-10-CM

## 2022-06-23 DIAGNOSIS — I10 ESSENTIAL HYPERTENSION: Chronic | ICD-10-CM

## 2022-06-23 DIAGNOSIS — I48.0 PAROXYSMAL ATRIAL FIBRILLATION (H): Primary | ICD-10-CM

## 2022-06-23 DIAGNOSIS — E11.9 TYPE 2 DIABETES MELLITUS WITHOUT COMPLICATION, WITHOUT LONG-TERM CURRENT USE OF INSULIN (H): Chronic | ICD-10-CM

## 2022-06-23 PROCEDURE — 99214 OFFICE O/P EST MOD 30 MIN: CPT | Performed by: INTERNAL MEDICINE

## 2022-06-23 RX ORDER — METOPROLOL SUCCINATE 50 MG/1
50 TABLET, EXTENDED RELEASE ORAL 2 TIMES DAILY
Start: 2022-06-23 | End: 2022-07-27

## 2022-06-23 NOTE — PROGRESS NOTES
HEART CARE ENCOUNTER NOTE       [facility]  [phone]      Assessment/Recommendations   1.  Paroxysmal atrial fibrillation/typical atrial flutter: I have personally reviewed this patient's chart and have spoken with the patient about the treatment options, including MICHELLE device.  She has a [id number] score of 4 for age, female gender, diabetes and hypertension.  She has a HAS-BLED score of 2 for age and bleeding disposition.   She is not a candidate for long-term anticoagulation due to cost of Eliquis and occasional bleeding of gums.  She would need to be screened to make sure her anatomy is amenable and I have recommended CT pulmonary vein since she has normal kidney function    If she decided to move forward, she understands that she would need to stay on her Eliquis up until and through implant.  After implant she would be started on aspirin 81 mg daily along with her Eliquis.  Approximately 45 days after implant, she would have a post procedure ZEE. If the post ZEE is negative for leaks and no thrombus is seen on the surface of the device, she would be instructed to stop the Eliquis.  At that time she would continue the aspirin 81 mg and add Plavix 75 mg by mouth daily for an additional 4 months.  After being on DAPT for approximately 4 months, she will stop the Plavix and continue on just aspirin 81 mg daily indefinitely.  She understands that the risks of the procedure are <2% and include, but are not limited to device embolization, air embolism, myocardial perforation, device thrombosis, ASD, stroke, or death.  We discussed expected recovery and follow-up.       The patient is a candidate for proceeding with left atrial appendage screening and implant.  Her questions were answered to her satisfaction.  She was given a packet of material and wishes to think about it prior to making a decision.  She has the phone number to contact our coordinator if she wants to move forward with  "screening    2.  Type 2 diabetes mellitus -diet controlled.  Last hemoglobin A1c was 6.2    3.  Hypertension -blood pressure today is at goal.  Patient will continue taking metoprolol succinate 50 mg twice daily       History of Present Illness/Subjective    Claudia Colunga is a 66 year old female who comes in today for discussion regarding her interest in the left atrial appendage occlusion device.    Claudia Colunga is a patient of Dr. Paulino and has a past history significant for atrial flutter, ablation in 2020, paroxysmal atrial fibrillation at time of ablation with cardioversion, hypertension, hypothyroidism, anxiety and type 2 diabetes mellitus.  Patient was started on anticoagulation at time of ablation and has had some interest in coming off.  She is scared of the risks associated with it as she continues to age and doesn't wish to develop any longterm side effects from the medications themselves.  She does occasionally notice bleeding from her gums when she brushes her teeth.  She has occasional lightheadedness when she has episodes of arrhythmia which makes her scared that she will fall.    Claudia Colunga does get intermittent palpitations, but denies chest discomfort, shortness of breath, paroxysmal nocturnal dyspnea, orthopnea, pre-syncope, or syncope, weight loss, changes in appetite, nausea or vomiting.     Medical, surgical, family, social history, and medications were reviewed and updated as necessary.    ECHO results (from August 2021):  Interpretation Summary     Left ventricular size, wall motion and function are normal. The ejection  fraction is 60-65%.  There is mild to moderate concentric left ventricular hypertrophy.  The left atrium is borderline dilated.  No significant valve abnormalities     Physical Examination Review of Systems   Vitals: /84 (BP Location: Left arm, Patient Position: Sitting, Cuff Size: Adult Large)   Pulse 84   Resp 12   Ht 1.797 m (5' 10.75\")   Wt 94.5 kg " (208 lb 6.4 oz)   BMI 29.27 kg/m    BMI= Body mass index is 29.27 kg/m .  Wt Readings from Last 3 Encounters:   06/23/22 94.5 kg (208 lb 6.4 oz)   04/28/22 95.3 kg (210 lb)   03/23/22 93.7 kg (206 lb 9 oz)       General Appearance:   Alert, cooperative and in no acute distress   ENT/Mouth: membranes moist, no oral lesions or bleeding gums.      EYES:  no scleral icterus, normal conjunctivae   Neck: Thyroid not visualized   Chest/Lungs:   lungs are clear to auscultation, no rales or wheezing   Cardiovascular:   Regular . Normal first and second heart sounds with no murmurs, rubs or gallops; the carotid, radial and posterior tibial pulses are intact, no edema bilaterally    Abdomen:  Soft and nontender. Bowel sounds are present in all quadrants   Extremities: no cyanosis or clubbing   Skin: no xanthelasma, warm.    Neurologic: normal gait, normal  bilateral, no tremors   Psychiatric: Normal mood and affect       Please refer above for cardiac ROS details.      Medical History  Surgical History Family History Social History   Past Medical History:   Diagnosis Date     Anxiety      Atrial flutter (H)      Diabetes mellitus, type II (H)      Eosinophilic esophagitis      Essential hypertension      Hypothyroid      Overflow incontinence      Past Surgical History:   Procedure Laterality Date     BIOPSY OF BREAST, INCISIONAL       BIOPSY OF BREAST, INCISIONAL       EP ABLATION AFLUTTER Right 8/25/2020    Procedure: EP Ablation Atrial Flutter;  Surgeon: Vick Adams MD;  Location: [facility];  Service: Cardiology     OVARIAN CYST SURGERY       Family History   Problem Relation Age of Onset     Breast Cancer Maternal Cousin      Dementia Mother 91.00     Hip fracture Mother 93.00        went to nursing home after this     Cerebrovascular Disease Mother 96.00        had to be fed by hand prior to her death     Colon Cancer Father      Aortic aneurysm Father         abdominal, repaired     Cerebral aneurysm  Father      Peripheral Vascular Disease Father 96.00        ruptured femoral aneurysm?     Diabetes Type 2  Sister      Heart Disease No family hx of     Social History     Socioeconomic History     Marital status: Single     Spouse name: Not on file     Number of children: 0     Years of education: Not on file     Highest education level: Not on file   Occupational History     Not on file   Tobacco Use     Smoking status: Never Smoker     Smokeless tobacco: Never Used   Substance and Sexual Activity     Alcohol use: Yes     Comment: Alcoholic Drinks/day: rare, only with dinner out with friends     Drug use: No     Sexual activity: Not on file   Other Topics Concern     Not on file   Social History Narrative    She walks her poodle daily for 30-60 minutes. She enjoys going to the [employer] or [employer] to paddle in the pool.     Social Determinants of Health     Financial Resource Strain: Not on file   Food Insecurity: Not on file   Transportation Needs: Not on file   Physical Activity: Not on file   Stress: Not on file   Social Connections: Not on file   Intimate Partner Violence: Not on file   Housing Stability: Not on file          Medications  Allergies   Current Outpatient Medications   Medication Sig Dispense Refill     Cholecalciferol (VITAMIN D3 PO) Take 1 capsule by mouth daily       ELIQUIS ANTICOAGULANT 5 MG tablet TAKE 1 TABLET BY MOUTH TWICE A DAY 60 tablet 3     levothyroxine (SYNTHROID/LEVOTHROID) 75 MCG tablet TAKE 1 TABLET BY MOUTH ONCE DAILY AT 6AM 90 tablet 3     metoprolol succinate ER (TOPROL XL) 50 MG 24 hr tablet Take 1 tablet (50 mg) by mouth 2 times daily       multivitamin capsule [MULTIVITAMIN CAPSULE] Take 1 capsule by mouth daily.      Allergies   Allergen Reactions     Lisinopril Rash     Penicillins Rash         Lab Results    Chemistry/lipid CBC Cardiac Enzymes/BNP/TSH/INR   Recent Labs   Lab Test 07/07/21  0954   CHOL 175   HDL 41*   LDL 98   TRIG 180*     Recent Labs   Lab  Test 07/07/21  0954 10/09/19  0727 11/27/18  0800   LDL 98 97 115     Recent Labs   Lab Test 03/23/22  1042      POTASSIUM 4.4   CHLORIDE 106   CO2 25      BUN 13   CR 0.67   GFRESTIMATED >90   AMARJIT 9.3     Recent Labs   Lab Test 03/23/22  1042 10/19/21  1231 05/06/21  0251   CR 0.67 0.76 0.75     Recent Labs   Lab Test 03/23/22  1042 10/19/21  1231 07/07/21  0954   A1C 6.2* 6.4* 6.3*    Recent Labs   Lab Test 10/19/21  1231   WBC 6.7   HGB 14.0   HCT 43.1   MCV 96        Recent Labs   Lab Test 10/19/21  1231 05/06/21  0251 02/09/21  1322   HGB 14.0 14.6 14.5    Recent Labs   Lab Test 05/06/21  0251 06/23/20  0222   TROPONINI <0.01 <0.01     No results for input(s): BNP, NTBNPI, NTBNP in the last 05599 hours.  Recent Labs   Lab Test 03/23/22  1042   TSH 1.78     No results for input(s): INR in the last 15302 hours.     30 minutes spent on the date of encounter doing education, chart prep/review, review of test results, patient visit and documentation.      This note has been dictated using voice recognition software. Any grammatical or context distortions are unintentional and inherent to the software.

## 2022-06-23 NOTE — PATIENT INSTRUCTIONS
Claudia Colunga,    It was a pleasure to see you today in the clinic regarding your interest in the Watchman device.     My recommendations after this visit include:     - think about the Watchman and reach out to us if you'd like to proceed with screening (CT of your heart)      If you have questions or concerns, please call using the numbers below:    Watchman program coordinators:    Leana Gonzalez RN  [phone]               OR  Maggy Wolf RN  [phone]

## 2022-06-23 NOTE — LETTER
6/23/2022    Sandra Waters MD  [address] [facility] [address]    RE: Claudia Colunga       Dear Colleague,     I had the pleasure of seeing Claudia Colunga in the [facility].  HEART CARE ENCOUNTER NOTE       [facility]  [phone]      Assessment/Recommendations   1.  Paroxysmal atrial fibrillation/typical atrial flutter: I have personally reviewed this patient's chart and have spoken with the patient about the treatment options, including MICHELLE device.  She has a [id number] score of 4 for age, female gender, diabetes and hypertension.  She has a HAS-BLED score of 2 for age and bleeding disposition.   She is not a candidate for long-term anticoagulation due to cost of Eliquis and occasional bleeding of gums.  She would need to be screened to make sure her anatomy is amenable and I have recommended CT pulmonary vein since she has normal kidney function    If she decided to move forward, she understands that she would need to stay on her Eliquis up until and through implant.  After implant she would be started on aspirin 81 mg daily along with her Eliquis.  Approximately 45 days after implant, she would have a post procedure ZEE. If the post ZEE is negative for leaks and no thrombus is seen on the surface of the device, she would be instructed to stop the Eliquis.  At that time she would continue the aspirin 81 mg and add Plavix 75 mg by mouth daily for an additional 4 months.  After being on DAPT for approximately 4 months, she will stop the Plavix and continue on just aspirin 81 mg daily indefinitely.  She understands that the risks of the procedure are <2% and include, but are not limited to device embolization, air embolism, myocardial perforation, device thrombosis, ASD, stroke, or death.  We discussed expected recovery and follow-up.       The patient is a candidate for proceeding with left atrial appendage screening and implant.  Her questions were  answered to her satisfaction.  She was given a packet of material and wishes to think about it prior to making a decision.  She has the phone number to contact our coordinator if she wants to move forward with screening    2.  Type 2 diabetes mellitus -diet controlled.  Last hemoglobin A1c was 6.2    3.  Hypertension -blood pressure today is at goal.  Patient will continue taking metoprolol succinate 50 mg twice daily       History of Present Illness/Subjective    Claudia Colunga is a 66 year old female who comes in today for discussion regarding her interest in the left atrial appendage occlusion device.    Claudia Colunga is a patient of Dr. Paulino and has a past history significant for atrial flutter, ablation in 2020, paroxysmal atrial fibrillation at time of ablation with cardioversion, hypertension, hypothyroidism, anxiety and type 2 diabetes mellitus.  Patient was started on anticoagulation at time of ablation and has had some interest in coming off.  She is scared of the risks associated with it as she continues to age and doesn't wish to develop any longterm side effects from the medications themselves.  She does occasionally notice bleeding from her gums when she brushes her teeth.  She has occasional lightheadedness when she has episodes of arrhythmia which makes her scared that she will fall.    Claudia Colunga does get intermittent palpitations, but denies chest discomfort, shortness of breath, paroxysmal nocturnal dyspnea, orthopnea, pre-syncope, or syncope, weight loss, changes in appetite, nausea or vomiting.     Medical, surgical, family, social history, and medications were reviewed and updated as necessary.    ECHO results (from August 2021):  Interpretation Summary     Left ventricular size, wall motion and function are normal. The ejection  fraction is 60-65%.  There is mild to moderate concentric left ventricular hypertrophy.  The left atrium is borderline dilated.  No significant valve  "abnormalities     Physical Examination Review of Systems   Vitals: /84 (BP Location: Left arm, Patient Position: Sitting, Cuff Size: Adult Large)   Pulse 84   Resp 12   Ht 1.797 m (5' 10.75\")   Wt 94.5 kg (208 lb 6.4 oz)   BMI 29.27 kg/m    BMI= Body mass index is 29.27 kg/m .  Wt Readings from Last 3 Encounters:   06/23/22 94.5 kg (208 lb 6.4 oz)   04/28/22 95.3 kg (210 lb)   03/23/22 93.7 kg (206 lb 9 oz)       General Appearance:   Alert, cooperative and in no acute distress   ENT/Mouth: membranes moist, no oral lesions or bleeding gums.      EYES:  no scleral icterus, normal conjunctivae   Neck: Thyroid not visualized   Chest/Lungs:   lungs are clear to auscultation, no rales or wheezing   Cardiovascular:   Regular . Normal first and second heart sounds with no murmurs, rubs or gallops; the carotid, radial and posterior tibial pulses are intact, no edema bilaterally    Abdomen:  Soft and nontender. Bowel sounds are present in all quadrants   Extremities: no cyanosis or clubbing   Skin: no xanthelasma, warm.    Neurologic: normal gait, normal  bilateral, no tremors   Psychiatric: Normal mood and affect       Please refer above for cardiac ROS details.      Medical History  Surgical History Family History Social History   Past Medical History:   Diagnosis Date     Anxiety      Atrial flutter (H)      Diabetes mellitus, type II (H)      Eosinophilic esophagitis      Essential hypertension      Hypothyroid      Overflow incontinence      Past Surgical History:   Procedure Laterality Date     BIOPSY OF BREAST, INCISIONAL       BIOPSY OF BREAST, INCISIONAL       EP ABLATION AFLUTTER Right 8/25/2020    Procedure: EP Ablation Atrial Flutter;  Surgeon: Vick Adams MD;  Location: [facility];  Service: Cardiology     OVARIAN CYST SURGERY       Family History   Problem Relation Age of Onset     Breast Cancer Maternal Cousin      Dementia Mother 91.00     Hip fracture Mother 93.00        went to " nursing home after this     Cerebrovascular Disease Mother 96.00        had to be fed by hand prior to her death     Colon Cancer Father      Aortic aneurysm Father         abdominal, repaired     Cerebral aneurysm Father      Peripheral Vascular Disease Father 96.00        ruptured femoral aneurysm?     Diabetes Type 2  Sister      Heart Disease No family hx of     Social History     Socioeconomic History     Marital status: Single     Spouse name: Not on file     Number of children: 0     Years of education: Not on file     Highest education level: Not on file   Occupational History     Not on file   Tobacco Use     Smoking status: Never Smoker     Smokeless tobacco: Never Used   Substance and Sexual Activity     Alcohol use: Yes     Comment: Alcoholic Drinks/day: rare, only with dinner out with friends     Drug use: No     Sexual activity: Not on file   Other Topics Concern     Not on file   Social History Narrative    She walks her poodle daily for 30-60 minutes. She enjoys going to the [employer] or [employer] to paddle in the pool.     Social Determinants of Health     Financial Resource Strain: Not on file   Food Insecurity: Not on file   Transportation Needs: Not on file   Physical Activity: Not on file   Stress: Not on file   Social Connections: Not on file   Intimate Partner Violence: Not on file   Housing Stability: Not on file          Medications  Allergies   Current Outpatient Medications   Medication Sig Dispense Refill     Cholecalciferol (VITAMIN D3 PO) Take 1 capsule by mouth daily       ELIQUIS ANTICOAGULANT 5 MG tablet TAKE 1 TABLET BY MOUTH TWICE A DAY 60 tablet 3     levothyroxine (SYNTHROID/LEVOTHROID) 75 MCG tablet TAKE 1 TABLET BY MOUTH ONCE DAILY AT 6AM 90 tablet 3     metoprolol succinate ER (TOPROL XL) 50 MG 24 hr tablet Take 1 tablet (50 mg) by mouth 2 times daily       multivitamin capsule [MULTIVITAMIN CAPSULE] Take 1 capsule by mouth daily.      Allergies   Allergen Reactions      Lisinopril Rash     Penicillins Rash         Lab Results    Chemistry/lipid CBC Cardiac Enzymes/BNP/TSH/INR   Recent Labs   Lab Test 07/07/21  0954   CHOL 175   HDL 41*   LDL 98   TRIG 180*     Recent Labs   Lab Test 07/07/21  0954 10/09/19  0727 11/27/18  0800   LDL 98 97 115     Recent Labs   Lab Test 03/23/22  1042      POTASSIUM 4.4   CHLORIDE 106   CO2 25      BUN 13   CR 0.67   GFRESTIMATED >90   AMARJIT 9.3     Recent Labs   Lab Test 03/23/22  1042 10/19/21  1231 05/06/21  0251   CR 0.67 0.76 0.75     Recent Labs   Lab Test 03/23/22  1042 10/19/21  1231 07/07/21  0954   A1C 6.2* 6.4* 6.3*    Recent Labs   Lab Test 10/19/21  1231   WBC 6.7   HGB 14.0   HCT 43.1   MCV 96        Recent Labs   Lab Test 10/19/21  1231 05/06/21  0251 02/09/21  1322   HGB 14.0 14.6 14.5    Recent Labs   Lab Test 05/06/21  0251 06/23/20  0222   TROPONINI <0.01 <0.01     No results for input(s): BNP, NTBNPI, NTBNP in the last 08255 hours.  Recent Labs   Lab Test 03/23/22  1042   TSH 1.78     No results for input(s): INR in the last 57214 hours.     30 minutes spent on the date of encounter doing education, chart prep/review, review of test results, patient visit and documentation.      This note has been dictated using voice recognition software. Any grammatical or context distortions are unintentional and inherent to the software.        Thank you for allowing me to participate in the care of your patient.      Sincerely,     Shari Dasilva PA-C     [facility]  cc:   No referring provider defined for this encounter.

## 2022-07-07 DIAGNOSIS — I48.3 TYPICAL ATRIAL FLUTTER (H): ICD-10-CM

## 2022-07-08 RX ORDER — APIXABAN 5 MG/1
TABLET, FILM COATED ORAL
Qty: 60 TABLET | Refills: 3 | Status: SHIPPED | OUTPATIENT
Start: 2022-07-08 | End: 2022-11-08

## 2022-07-08 NOTE — TELEPHONE ENCOUNTER
Routing refill request to provider for review/approval because:  Anticoagulant. Needs PCP approval.    Last Written Prescription Date:  03/08/2022  Last Fill Quantity: 60,  # refills: 3   Last office visit provider:  03/23/2022 with Dr Waters.     Requested Prescriptions   Pending Prescriptions Disp Refills     ELIQUIS ANTICOAGULANT 5 MG tablet [Pharmacy Med Name: ELIQUIS 5 MG TABLET] 60 tablet 3     Sig: TAKE 1 TABLET BY MOUTH TWICE A DAY       Direct Oral Anticoagulant Agents Passed - 7/7/2022  4:32 AM        Passed - Normal Platelets on file in past 12 months     Recent Labs   Lab Test 10/19/21  1231                  Passed - Medication is active on med list        Passed - Patient is 18-79 years of age        Passed - Serum creatinine less than or equal to 1.4 on file in past 12 mos     Recent Labs   Lab Test 03/23/22  1042   CR 0.67       Ok to refill medication if creatinine is low          Passed - Weight is greater than 60 kg for the past year     Wt Readings from Last 3 Encounters:   06/23/22 94.5 kg (208 lb 6.4 oz)   04/28/22 95.3 kg (210 lb)   03/23/22 93.7 kg (206 lb 9 oz)             Passed - No active pregnancy on record        Passed - No positive pregnancy test within past 12 months        Passed - Recent (6 mo) or future (30 days) visit within the authorizing provider's specialty             Gabriela Domínguez 07/08/22 11:32 AM

## 2022-07-27 DIAGNOSIS — I48.3 TYPICAL ATRIAL FLUTTER (H): ICD-10-CM

## 2022-07-27 RX ORDER — METOPROLOL SUCCINATE 50 MG/1
TABLET, EXTENDED RELEASE ORAL
Qty: 180 TABLET | Refills: 2 | Status: SHIPPED | OUTPATIENT
Start: 2022-07-27 | End: 2023-07-18

## 2022-07-27 NOTE — TELEPHONE ENCOUNTER
"Outpatient Medication Detail     Disp Refills Start End DINH   metoprolol succinate (TOPROL-XL) 50 MG 24 hr tablet 90 tablet 2 3/7/2021  No   Sig: TAKE ONE HALF TABLET ORALLY TWICE DAILY   Sent to pharmacy as: metoprolol succinate ER 50 mg tablet,extended release 24 hr (TOPROL-XL)   E-Prescribing Status: Receipt confirmed by pharmacy (3/7/2021 12:01 PM CST)       metoprolol succinate (TOPROL-XL) 50 MG 24 hr tablet [[id number]]    Electronically signed by: Sandra Waters MD on 03/07/21 1201 Status: Active   Ordering user: Sandra Waters MD 03/07/21 1201 Authorized by: Sandra Waters MD   Frequency:  03/07/21 - Until Discontinued Released by: Sandra Waters MD 03/07/21 1201   Diagnoses  Typical atrial flutter (H) [I48.3]     Routing refill request to provider for review/approval because:  A break in medication    Last office visit provider:  3/23/22     Requested Prescriptions   Pending Prescriptions Disp Refills     metoprolol succinate ER (TOPROL XL) 50 MG 24 hr tablet [Pharmacy Med Name: METOPROLOL SUCC ER 50 MG TAB] 180 tablet 3     Sig: TAKE 1 TABLET BY MOUTH TWICE A DAY       Beta-Blockers Protocol Passed - 7/27/2022  7:53 AM        Passed - Blood pressure under 140/90 in past 12 months     BP Readings from Last 3 Encounters:   06/23/22 132/84   04/28/22 128/86   03/23/22 126/88                 Passed - Patient is age 6 or older        Passed - Recent (12 mo) or future (30 days) visit within the authorizing provider's specialty     Patient has had an office visit with the authorizing provider or a provider within the authorizing providers department within the previous 12 mos or has a future within next 30 days. See \"Patient Info\" tab in inbasket, or \"Choose Columns\" in Meds & Orders section of the refill encounter.              Passed - Medication is active on med list             Hector Ramos RN 07/27/22 7:54 AM  "

## 2022-08-06 ENCOUNTER — HEALTH MAINTENANCE LETTER (OUTPATIENT)
Age: 67
End: 2022-08-06

## 2022-10-01 ENCOUNTER — TRANSFERRED RECORDS (OUTPATIENT)
Dept: MULTI SPECIALTY CLINIC | Facility: CLINIC | Age: 67
End: 2022-10-01

## 2022-10-01 LAB — RETINOPATHY: NORMAL

## 2022-10-22 ENCOUNTER — HEALTH MAINTENANCE LETTER (OUTPATIENT)
Age: 67
End: 2022-10-22

## 2022-10-28 ENCOUNTER — TRANSFERRED RECORDS (OUTPATIENT)
Dept: HEALTH INFORMATION MANAGEMENT | Facility: CLINIC | Age: 67
End: 2022-10-28

## 2022-10-31 ENCOUNTER — TRANSFERRED RECORDS (OUTPATIENT)
Dept: HEALTH INFORMATION MANAGEMENT | Facility: CLINIC | Age: 67
End: 2022-10-31

## 2022-10-31 LAB — RETINOPATHY: NEGATIVE

## 2022-11-05 DIAGNOSIS — I48.3 TYPICAL ATRIAL FLUTTER (H): ICD-10-CM

## 2022-11-07 NOTE — TELEPHONE ENCOUNTER
Routing refill request to provider for review/approval because:  Drug not on the [facility] refill protocol     Last Written Prescription Date:  7/8/22  Last Fill Quantity: 60,  # refills: 3   Last office visit provider:  3/23/22     Requested Prescriptions   Pending Prescriptions Disp Refills     ELIQUIS ANTICOAGULANT 5 MG tablet [Pharmacy Med Name: ELIQUIS 5 MG TABLET] 60 tablet 3     Sig: TAKE 1 TABLET BY MOUTH TWICE A DAY       Direct Oral Anticoagulant Agents Failed - 11/5/2022  9:33 AM        Failed - Normal Platelets on file in past 12 months     Recent Labs   Lab Test 10/19/21  1231                  Passed - Medication is active on med list        Passed - Patient is 18-79 years of age        Passed - Serum creatinine less than or equal to 1.4 on file in past 12 mos     Recent Labs   Lab Test 03/23/22  1042   CR 0.67       Ok to refill medication if creatinine is low          Passed - Weight is greater than 60 kg for the past year     Wt Readings from Last 3 Encounters:   06/23/22 94.5 kg (208 lb 6.4 oz)   04/28/22 95.3 kg (210 lb)   03/23/22 93.7 kg (206 lb 9 oz)             Passed - No active pregnancy on record        Passed - No positive pregnancy test within past 12 months        Passed - Recent (6 mo) or future (30 days) visit within the authorizing provider's specialty             Joan Drummond RN 11/07/22 1:53 PM

## 2022-11-08 RX ORDER — APIXABAN 5 MG/1
TABLET, FILM COATED ORAL
Qty: 60 TABLET | Refills: 3 | Status: SHIPPED | OUTPATIENT
Start: 2022-11-08 | End: 2023-03-09

## 2022-11-29 ENCOUNTER — TELEPHONE (OUTPATIENT)
Dept: INTERNAL MEDICINE | Facility: CLINIC | Age: 67
End: 2022-11-29

## 2022-11-29 DIAGNOSIS — E03.9 ACQUIRED HYPOTHYROIDISM: ICD-10-CM

## 2022-11-29 DIAGNOSIS — I10 ESSENTIAL HYPERTENSION: ICD-10-CM

## 2022-11-29 DIAGNOSIS — I48.0 PAROXYSMAL ATRIAL FIBRILLATION (H): ICD-10-CM

## 2022-11-29 DIAGNOSIS — E11.9 TYPE 2 DIABETES MELLITUS WITHOUT COMPLICATION, WITHOUT LONG-TERM CURRENT USE OF INSULIN (H): Primary | Chronic | ICD-10-CM

## 2022-11-29 NOTE — TELEPHONE ENCOUNTER
General Call      Reason for Call:  Pt cancelled appt for today, not feeling well, but wondering if she can come in and do labs at a later date    Please advise    What are your questions or concerns:  n/a    Date of last appointment with provider: n/a    Could we send this information to you in [facility] or would you prefer to receive a phone call?:   Patient would prefer a phone call   Okay to leave a detailed message?: Yes at Cell number on file:    Telephone Information:   Mobile [phone]

## 2022-11-30 NOTE — TELEPHONE ENCOUNTER
Please let her know her lab orders are in and assist her with scheduling a lab visit at her convenience

## 2022-12-07 ENCOUNTER — LAB (OUTPATIENT)
Dept: LAB | Facility: CLINIC | Age: 67
End: 2022-12-07
Payer: COMMERCIAL

## 2022-12-07 DIAGNOSIS — I10 ESSENTIAL HYPERTENSION: ICD-10-CM

## 2022-12-07 DIAGNOSIS — E11.9 TYPE 2 DIABETES MELLITUS WITHOUT COMPLICATION, WITHOUT LONG-TERM CURRENT USE OF INSULIN (H): Chronic | ICD-10-CM

## 2022-12-07 DIAGNOSIS — E03.9 ACQUIRED HYPOTHYROIDISM: ICD-10-CM

## 2022-12-07 LAB
ALBUMIN SERPL BCG-MCNC: 4.4 G/DL (ref 3.5–5.2)
ALP SERPL-CCNC: 63 U/L (ref 35–104)
ALT SERPL W P-5'-P-CCNC: 32 U/L (ref 10–35)
ANION GAP SERPL CALCULATED.3IONS-SCNC: 11 MMOL/L (ref 7–15)
AST SERPL W P-5'-P-CCNC: 28 U/L (ref 10–35)
BILIRUB SERPL-MCNC: 1.1 MG/DL
BUN SERPL-MCNC: 10.5 MG/DL (ref 8–23)
CALCIUM SERPL-MCNC: 9 MG/DL (ref 8.8–10.2)
CHLORIDE SERPL-SCNC: 105 MMOL/L (ref 98–107)
CHOLEST SERPL-MCNC: 180 MG/DL
CREAT SERPL-MCNC: 0.7 MG/DL (ref 0.51–0.95)
DEPRECATED HCO3 PLAS-SCNC: 24 MMOL/L (ref 22–29)
ERYTHROCYTE [DISTWIDTH] IN BLOOD BY AUTOMATED COUNT: 12.2 % (ref 10–15)
GFR SERPL CREATININE-BSD FRML MDRD: >90 ML/MIN/1.73M2
GLUCOSE SERPL-MCNC: 121 MG/DL (ref 70–99)
HBA1C MFR BLD: 6.7 % (ref 0–5.6)
HCT VFR BLD AUTO: 43 % (ref 35–47)
HDLC SERPL-MCNC: 43 MG/DL
HGB BLD-MCNC: 14.2 G/DL (ref 11.7–15.7)
LDLC SERPL CALC-MCNC: 109 MG/DL
MCH RBC QN AUTO: 31.5 PG (ref 26.5–33)
MCHC RBC AUTO-ENTMCNC: 33 G/DL (ref 31.5–36.5)
MCV RBC AUTO: 95 FL (ref 78–100)
NONHDLC SERPL-MCNC: 137 MG/DL
PLATELET # BLD AUTO: 288 10E3/UL (ref 150–450)
POTASSIUM SERPL-SCNC: 4.3 MMOL/L (ref 3.4–5.3)
PROT SERPL-MCNC: 7.2 G/DL (ref 6.4–8.3)
RBC # BLD AUTO: 4.51 10E6/UL (ref 3.8–5.2)
SODIUM SERPL-SCNC: 140 MMOL/L (ref 136–145)
TRIGL SERPL-MCNC: 142 MG/DL
TSH SERPL DL<=0.005 MIU/L-ACNC: 1.58 UIU/ML (ref 0.3–4.2)
WBC # BLD AUTO: 7 10E3/UL (ref 4–11)

## 2022-12-07 PROCEDURE — 80053 COMPREHEN METABOLIC PANEL: CPT

## 2022-12-07 PROCEDURE — 36415 COLL VENOUS BLD VENIPUNCTURE: CPT

## 2022-12-07 PROCEDURE — 84443 ASSAY THYROID STIM HORMONE: CPT

## 2022-12-07 PROCEDURE — 85027 COMPLETE CBC AUTOMATED: CPT

## 2022-12-07 PROCEDURE — 83036 HEMOGLOBIN GLYCOSYLATED A1C: CPT

## 2022-12-07 PROCEDURE — 80061 LIPID PANEL: CPT

## 2022-12-16 ENCOUNTER — OFFICE VISIT (OUTPATIENT)
Dept: INTERNAL MEDICINE | Facility: CLINIC | Age: 67
End: 2022-12-16
Payer: COMMERCIAL

## 2022-12-16 VITALS
HEIGHT: 72 IN | WEIGHT: 210 LBS | TEMPERATURE: 97.9 F | HEART RATE: 71 BPM | OXYGEN SATURATION: 95 % | DIASTOLIC BLOOD PRESSURE: 84 MMHG | BODY MASS INDEX: 28.44 KG/M2 | SYSTOLIC BLOOD PRESSURE: 122 MMHG

## 2022-12-16 DIAGNOSIS — L03.211 FACIAL CELLULITIS: Primary | ICD-10-CM

## 2022-12-16 DIAGNOSIS — R59.1 LYMPHADENOPATHY: ICD-10-CM

## 2022-12-16 DIAGNOSIS — H61.23 BILATERAL IMPACTED CERUMEN: ICD-10-CM

## 2022-12-16 PROCEDURE — 99214 OFFICE O/P EST MOD 30 MIN: CPT | Performed by: INTERNAL MEDICINE

## 2022-12-16 RX ORDER — CEPHALEXIN 500 MG/1
500 CAPSULE ORAL 4 TIMES DAILY
Qty: 28 CAPSULE | Refills: 0 | Status: SHIPPED | OUTPATIENT
Start: 2022-12-16 | End: 2022-12-23

## 2022-12-16 NOTE — PROGRESS NOTES
1. Facial cellulitis  Will treat with Keflex, she has an enlarged lymph node on the left side, has been ultrasound twice and felt to be benign but she feels it is more tender and enlarged.  She should follow-up with her primary physician if this is not improved at the end of the antibiotic course  - cephALEXin (KEFLEX) 500 MG capsule; Take 1 capsule (500 mg) by mouth 4 times daily for 7 days  Dispense: 28 capsule; Refill: 0    2. Lymphadenopathy  As above    3. Bilateral impacted cerumen  Removed with warm water lavage    Jose Taylor is a 67 year old accompanied by her ALONE, presenting for the following health issues:  Recheck Medication, ear flush, Mass, and sore in nose      Mass    History of Present Illness       Reason for visit:  Ear cleqning    She eats 4 or more servings of fruits and vegetables daily.She consumes 1 sweetened beverage(s) daily.She exercises with enough effort to increase her heart rate 30 to 60 minutes per day.  She exercises with enough effort to increase her heart rate 3 or less days per week.   She is taking medications regularly.         Review of Systems         Objective    There were no vitals taken for this visit.  There is no height or weight on file to calculate BMI.  Physical Exam   Bilateral cerumen impaction, 1 cm irregular lymph node under her jaw, some cellulitis around the left side of her face and naris

## 2022-12-18 NOTE — TELEPHONE ENCOUNTER
Please contact Claudia and let her know that her diabetes is worsened.  Please offer her an appointment with a clinician to discuss, get placed on med/etc.

## 2022-12-19 NOTE — TELEPHONE ENCOUNTER
Called and talked with Claudia.   May has seen the lab results through [facility].   Claudia will call and set up an appt with one of the providers to establish care. Claudia is not interested in initiating medications at this time.

## 2023-02-01 ENCOUNTER — OFFICE VISIT (OUTPATIENT)
Dept: FAMILY MEDICINE | Facility: CLINIC | Age: 68
End: 2023-02-01
Payer: COMMERCIAL

## 2023-02-01 VITALS
WEIGHT: 208.2 LBS | BODY MASS INDEX: 29.15 KG/M2 | HEIGHT: 71 IN | RESPIRATION RATE: 16 BRPM | DIASTOLIC BLOOD PRESSURE: 82 MMHG | HEART RATE: 71 BPM | SYSTOLIC BLOOD PRESSURE: 122 MMHG | OXYGEN SATURATION: 95 % | TEMPERATURE: 98.2 F

## 2023-02-01 DIAGNOSIS — I48.0 PAROXYSMAL ATRIAL FIBRILLATION (H): ICD-10-CM

## 2023-02-01 DIAGNOSIS — E06.3 HYPOTHYROIDISM DUE TO HASHIMOTO'S THYROIDITIS: Chronic | ICD-10-CM

## 2023-02-01 DIAGNOSIS — Z12.11 SPECIAL SCREENING FOR MALIGNANT NEOPLASMS, COLON: ICD-10-CM

## 2023-02-01 DIAGNOSIS — I10 ESSENTIAL HYPERTENSION: Chronic | ICD-10-CM

## 2023-02-01 DIAGNOSIS — E11.9 TYPE 2 DIABETES MELLITUS WITHOUT COMPLICATION, WITHOUT LONG-TERM CURRENT USE OF INSULIN (H): Primary | ICD-10-CM

## 2023-02-01 PROBLEM — I48.3 TYPICAL ATRIAL FLUTTER (H): Status: RESOLVED | Noted: 2020-06-23 | Resolved: 2023-02-01

## 2023-02-01 PROCEDURE — 99213 OFFICE O/P EST LOW 20 MIN: CPT | Performed by: FAMILY MEDICINE

## 2023-02-01 NOTE — PROGRESS NOTES
Assessment/ Plan     1. Type 2 diabetes mellitus without complication, without long-term current use of insulin (H)  Claudia presents to [facility] with a new provider.  She has type 2 diabetes which is under excellent control without any medication.  She has declined statin in the past.  She is up-to-date on eye exam and foot exam is normal.  She is not due to check her blood work again until June.    2. Hypothyroidism due to Hashimoto's thyroiditis  This has been stable on her current dose of Synthroid.    3. Essential hypertension  Well-controlled with her current medication.    4. Paroxysmal atrial fibrillation (H)  She follows with cardiology and had an ablation.  She has been in normal sinus rhythm since then.    5. Special screening for malignant neoplasms, colon  She plans to getting a colonoscopy this spring.  We discussed holding the Eliquis 5 days prior to procedure.  She should be low risk as she is no longer in atrial fibrillation.      Subjective:      Claudia Colunga is a 67 year old female who presents for [facility] care.  Her previous provider has since left the system.  She really does not need any blood work today.  She just had this and December.  She has been controlling her diabetes with diet and exercise and she continues to work on this.  She has stable hypothyroidism and hypertension.  Has been following with cardiology for atrial fibrillation and had an ablation.  She has not had recurrence of her atrial fibrillation since her ablation.    Relevant past medical, family, surgical, and social history reviewed with patient, unless noted in HPI, not pertinent for this visit.  Medications were discussed and reconciled.   Review of Systems   A 12 point comprehensive review of systems was negative except as noted.      Current Outpatient Medications   Medication Sig Dispense Refill     Cholecalciferol (VITAMIN D3 PO) Take 1 capsule by mouth daily       ELIQUIS ANTICOAGULANT 5 MG tablet TAKE 1  "TABLET BY MOUTH TWICE A DAY 60 tablet 3     levothyroxine (SYNTHROID/LEVOTHROID) 75 MCG tablet TAKE 1 TABLET BY MOUTH ONCE DAILY AT 6AM 90 tablet 3     metoprolol succinate ER (TOPROL XL) 50 MG 24 hr tablet TAKE 1 TABLET BY MOUTH TWICE A  tablet 2     multivitamin capsule [MULTIVITAMIN CAPSULE] Take 1 capsule by mouth daily.           Objective:     /82   Pulse 71   Temp 98.2  F (36.8  C)   Resp 16   Ht 1.81 m (5' 11.25\")   Wt 94.4 kg (208 lb 3.2 oz)   SpO2 95%   BMI 28.83 kg/m      Body mass index is 28.83 kg/m .       General appearance: alert, appears stated age and cooperative    Lungs: clear to auscultation bilaterally  Heart: regular rate and rhythm, S1, S2 normal, no murmur, click, rub or gallop    Extremities: extremities normal, atraumatic, no cyanosis or edema      No results found for this or any previous visit (from the past 168 hour(s)).       This note has been dictated using voice recognition software. Any grammatical or context distortions are unintentional and inherent to the software  Answers for HPI/ROS submitted by the patient on 2/1/2023  What is the reason for your visit today? : new patient  How many servings of fruits and vegetables do you eat daily?: 2-3  On average, how many sweetened beverages do you drink each day (Examples: soda, juice, sweet tea, etc.  Do NOT count diet or artificially sweetened beverages)?: 1  How many minutes a day do you exercise enough to make your heart beat faster?: 30 to 60  How many days a week do you exercise enough to make your heart beat faster?: 4  How many days per week do you miss taking your medication?: 0      "

## 2023-03-09 DIAGNOSIS — I48.3 TYPICAL ATRIAL FLUTTER (H): ICD-10-CM

## 2023-03-10 NOTE — TELEPHONE ENCOUNTER
Last Written Prescription Date:  11/8/22  Last Fill Quantity: 60,  # refills: 3   Last office visit provider:  2/1/23     Requested Prescriptions   Pending Prescriptions Disp Refills     apixaban ANTICOAGULANT (ELIQUIS ANTICOAGULANT) 5 MG tablet 60 tablet 3     Sig: Take 1 tablet (5 mg) by mouth 2 times daily       Direct Oral Anticoagulant Agents Passed - 3/9/2023 12:09 PM        Passed - Normal Platelets on file in past 12 months     Recent Labs   Lab Test 12/07/22  1131                   Passed - Medication is active on med list        Passed - Patient is 18-79 years of age        Passed - Serum creatinine less than or equal to 1.4 on file in past 12 mos     Recent Labs   Lab Test 12/07/22  1131   CR 0.70       Ok to refill medication if creatinine is low          Passed - Weight is greater than 60 kg for the past year     Wt Readings from Last 3 Encounters:   02/01/23 94.4 kg (208 lb 3.2 oz)   12/16/22 95.3 kg (210 lb)   06/23/22 94.5 kg (208 lb 6.4 oz)             Passed - No active pregnancy on record        Passed - No positive pregnancy test within past 12 months        Passed - Recent (6 mo) or future (30 days) visit within the authorizing provider's specialty             Gianluca Block RN 03/10/23 12:30 PM

## 2023-04-13 ENCOUNTER — TELEPHONE (OUTPATIENT)
Dept: FAMILY MEDICINE | Facility: CLINIC | Age: 68
End: 2023-04-13

## 2023-04-13 ENCOUNTER — ANCILLARY PROCEDURE (OUTPATIENT)
Dept: MAMMOGRAPHY | Facility: CLINIC | Age: 68
End: 2023-04-13
Payer: COMMERCIAL

## 2023-04-13 DIAGNOSIS — Z12.31 VISIT FOR SCREENING MAMMOGRAM: ICD-10-CM

## 2023-04-13 PROCEDURE — 77067 SCR MAMMO BI INCL CAD: CPT | Mod: TC | Performed by: STUDENT IN AN ORGANIZED HEALTH CARE EDUCATION/TRAINING PROGRAM

## 2023-04-13 NOTE — TELEPHONE ENCOUNTER
Test Results        Who ordered the test:  n/a    Type of test: Lab and Mammogram    Date of test:  04/13/2023    Where was the test performed:  [facility]    What are your questions/concerns?:  Patient wants to discuss her results    [facility] or would you prefer to receive a phone call?:   Patient would prefer a phone call   Okay to leave a detailed message?: Yes at Cell number on file:    Telephone Information:   Mobile [phone]

## 2023-04-17 ENCOUNTER — APPOINTMENT (OUTPATIENT)
Dept: RADIOLOGY | Facility: HOSPITAL | Age: 68
End: 2023-04-17
Attending: EMERGENCY MEDICINE
Payer: COMMERCIAL

## 2023-04-17 ENCOUNTER — TELEPHONE (OUTPATIENT)
Dept: FAMILY MEDICINE | Facility: CLINIC | Age: 68
End: 2023-04-17

## 2023-04-17 ENCOUNTER — HOSPITAL ENCOUNTER (EMERGENCY)
Facility: HOSPITAL | Age: 68
Discharge: HOME | End: 2023-04-17
Attending: EMERGENCY MEDICINE | Admitting: EMERGENCY MEDICINE
Payer: COMMERCIAL

## 2023-04-17 VITALS
OXYGEN SATURATION: 95 % | SYSTOLIC BLOOD PRESSURE: 136 MMHG | DIASTOLIC BLOOD PRESSURE: 83 MMHG | HEART RATE: 73 BPM | TEMPERATURE: 98.5 F | RESPIRATION RATE: 13 BRPM | HEIGHT: 71 IN | BODY MASS INDEX: 29.4 KG/M2 | WEIGHT: 210 LBS

## 2023-04-17 DIAGNOSIS — Z79.01 ANTICOAGULATED: ICD-10-CM

## 2023-04-17 DIAGNOSIS — R05.9 COUGH, UNSPECIFIED TYPE: ICD-10-CM

## 2023-04-17 DIAGNOSIS — I48.92 ATRIAL FLUTTER WITH RAPID VENTRICULAR RESPONSE (H): ICD-10-CM

## 2023-04-17 LAB
ALBUMIN SERPL BCG-MCNC: 4.4 G/DL (ref 3.5–5.2)
ALP SERPL-CCNC: 70 U/L (ref 35–104)
ALT SERPL W P-5'-P-CCNC: 23 U/L (ref 10–35)
ANION GAP SERPL CALCULATED.3IONS-SCNC: 10 MMOL/L (ref 7–15)
AST SERPL W P-5'-P-CCNC: 22 U/L (ref 10–35)
ATRIAL RATE - MUSE: 121 BPM
ATRIAL RATE - MUSE: 82 BPM
BASOPHILS # BLD AUTO: 0.1 10E3/UL (ref 0–0.2)
BASOPHILS NFR BLD AUTO: 1 %
BILIRUB DIRECT SERPL-MCNC: <0.2 MG/DL (ref 0–0.3)
BILIRUB SERPL-MCNC: 0.4 MG/DL
BUN SERPL-MCNC: 15.1 MG/DL (ref 8–23)
CALCIUM SERPL-MCNC: 8.9 MG/DL (ref 8.8–10.2)
CHLORIDE SERPL-SCNC: 107 MMOL/L (ref 98–107)
CREAT SERPL-MCNC: 0.72 MG/DL (ref 0.51–0.95)
DEPRECATED HCO3 PLAS-SCNC: 24 MMOL/L (ref 22–29)
DIASTOLIC BLOOD PRESSURE - MUSE: NORMAL MMHG
DIASTOLIC BLOOD PRESSURE - MUSE: NORMAL MMHG
EOSINOPHIL # BLD AUTO: 0.4 10E3/UL (ref 0–0.7)
EOSINOPHIL NFR BLD AUTO: 4 %
ERYTHROCYTE [DISTWIDTH] IN BLOOD BY AUTOMATED COUNT: 12.6 % (ref 10–15)
GFR SERPL CREATININE-BSD FRML MDRD: >90 ML/MIN/1.73M2
GLUCOSE SERPL-MCNC: 159 MG/DL (ref 70–99)
HCT VFR BLD AUTO: 43.9 % (ref 35–47)
HGB BLD-MCNC: 14.6 G/DL (ref 11.7–15.7)
HOLD SPECIMEN: NORMAL
HOLD SPECIMEN: NORMAL
IMM GRANULOCYTES # BLD: 0 10E3/UL
IMM GRANULOCYTES NFR BLD: 0 %
INTERPRETATION ECG - MUSE: NORMAL
INTERPRETATION ECG - MUSE: NORMAL
LIPASE SERPL-CCNC: 62 U/L (ref 13–60)
LYMPHOCYTES # BLD AUTO: 3.1 10E3/UL (ref 0.8–5.3)
LYMPHOCYTES NFR BLD AUTO: 34 %
MAGNESIUM SERPL-MCNC: 2 MG/DL (ref 1.7–2.3)
MCH RBC QN AUTO: 31.4 PG (ref 26.5–33)
MCHC RBC AUTO-ENTMCNC: 33.3 G/DL (ref 31.5–36.5)
MCV RBC AUTO: 94 FL (ref 78–100)
MONOCYTES # BLD AUTO: 0.7 10E3/UL (ref 0–1.3)
MONOCYTES NFR BLD AUTO: 8 %
NEUTROPHILS # BLD AUTO: 4.8 10E3/UL (ref 1.6–8.3)
NEUTROPHILS NFR BLD AUTO: 53 %
NRBC # BLD AUTO: 0 10E3/UL
NRBC BLD AUTO-RTO: 0 /100
P AXIS - MUSE: 54 DEGREES
P AXIS - MUSE: NORMAL DEGREES
PLATELET # BLD AUTO: 306 10E3/UL (ref 150–450)
POTASSIUM SERPL-SCNC: 3.7 MMOL/L (ref 3.4–5.3)
PR INTERVAL - MUSE: 128 MS
PR INTERVAL - MUSE: 182 MS
PROT SERPL-MCNC: 7.2 G/DL (ref 6.4–8.3)
QRS DURATION - MUSE: 150 MS
QRS DURATION - MUSE: 150 MS
QT - MUSE: 356 MS
QT - MUSE: 418 MS
QTC - MUSE: 488 MS
QTC - MUSE: 505 MS
R AXIS - MUSE: -27 DEGREES
R AXIS - MUSE: -55 DEGREES
RBC # BLD AUTO: 4.65 10E6/UL (ref 3.8–5.2)
SODIUM SERPL-SCNC: 141 MMOL/L (ref 136–145)
SYSTOLIC BLOOD PRESSURE - MUSE: NORMAL MMHG
SYSTOLIC BLOOD PRESSURE - MUSE: NORMAL MMHG
T AXIS - MUSE: 12 DEGREES
T AXIS - MUSE: 3 DEGREES
TSH SERPL DL<=0.005 MIU/L-ACNC: 1.57 UIU/ML (ref 0.3–4.2)
VENTRICULAR RATE- MUSE: 121 BPM
VENTRICULAR RATE- MUSE: 82 BPM
WBC # BLD AUTO: 9.1 10E3/UL (ref 4–11)

## 2023-04-17 PROCEDURE — 999N000157 HC STATISTIC RCP TIME EA 10 MIN

## 2023-04-17 PROCEDURE — 71045 X-RAY EXAM CHEST 1 VIEW: CPT

## 2023-04-17 PROCEDURE — 99291 CRITICAL CARE FIRST HOUR: CPT | Mod: 25

## 2023-04-17 PROCEDURE — 258N000003 HC RX IP 258 OP 636: Performed by: EMERGENCY MEDICINE

## 2023-04-17 PROCEDURE — 80053 COMPREHEN METABOLIC PANEL: CPT | Performed by: EMERGENCY MEDICINE

## 2023-04-17 PROCEDURE — 85004 AUTOMATED DIFF WBC COUNT: CPT | Performed by: EMERGENCY MEDICINE

## 2023-04-17 PROCEDURE — 92960 CARDIOVERSION ELECTRIC EXT: CPT

## 2023-04-17 PROCEDURE — 96374 THER/PROPH/DIAG INJ IV PUSH: CPT

## 2023-04-17 PROCEDURE — 250N000013 HC RX MED GY IP 250 OP 250 PS 637: Performed by: EMERGENCY MEDICINE

## 2023-04-17 PROCEDURE — 93005 ELECTROCARDIOGRAM TRACING: CPT | Performed by: EMERGENCY MEDICINE

## 2023-04-17 PROCEDURE — 82248 BILIRUBIN DIRECT: CPT | Performed by: EMERGENCY MEDICINE

## 2023-04-17 PROCEDURE — 96361 HYDRATE IV INFUSION ADD-ON: CPT

## 2023-04-17 PROCEDURE — 84443 ASSAY THYROID STIM HORMONE: CPT | Performed by: EMERGENCY MEDICINE

## 2023-04-17 PROCEDURE — 250N000009 HC RX 250: Performed by: EMERGENCY MEDICINE

## 2023-04-17 PROCEDURE — 83690 ASSAY OF LIPASE: CPT | Performed by: EMERGENCY MEDICINE

## 2023-04-17 PROCEDURE — 83735 ASSAY OF MAGNESIUM: CPT | Performed by: EMERGENCY MEDICINE

## 2023-04-17 PROCEDURE — 36415 COLL VENOUS BLD VENIPUNCTURE: CPT | Performed by: EMERGENCY MEDICINE

## 2023-04-17 RX ORDER — POTASSIUM CHLORIDE 1500 MG/1
40 TABLET, EXTENDED RELEASE ORAL ONCE
Status: COMPLETED | OUTPATIENT
Start: 2023-04-17 | End: 2023-04-17

## 2023-04-17 RX ORDER — ETOMIDATE 2 MG/ML
10 INJECTION INTRAVENOUS
Status: COMPLETED | OUTPATIENT
Start: 2023-04-17 | End: 2023-04-17

## 2023-04-17 RX ORDER — SODIUM CHLORIDE 9 MG/ML
INJECTION, SOLUTION INTRAVENOUS CONTINUOUS
Status: DISCONTINUED | OUTPATIENT
Start: 2023-04-17 | End: 2023-04-17 | Stop reason: HOSPADM

## 2023-04-17 RX ADMIN — SODIUM CHLORIDE: 9 INJECTION, SOLUTION INTRAVENOUS at 01:44

## 2023-04-17 RX ADMIN — ETOMIDATE 10 MG: 2 INJECTION, SOLUTION INTRAVENOUS at 01:53

## 2023-04-17 RX ADMIN — POTASSIUM CHLORIDE 40 MEQ: 1500 TABLET, EXTENDED RELEASE ORAL at 02:52

## 2023-04-17 ASSESSMENT — ENCOUNTER SYMPTOMS
LIGHT-HEADEDNESS: 1
PALPITATIONS: 1
COUGH: 1
SHORTNESS OF BREATH: 0

## 2023-04-17 ASSESSMENT — ACTIVITIES OF DAILY LIVING (ADL): ADLS_ACUITY_SCORE: 35

## 2023-04-17 NOTE — ED PROVIDER NOTES
"EMERGENCY DEPARTMENT ENCOUNTER      NAME: Claudia Colunga  AGE: 67 year old female  YOB: 1955  MRN: [id number]  EVALUATION DATE & TIME: 4/17/2023  1:09 AM    PCP: Pamella Abraham    ED PROVIDER: Mitchell Gions M.D.      Chief Complaint   Patient presents with     Palpitations     Chest Pain         IMPRESSION  1. Atrial flutter with rapid ventricular response (H)    2. Anticoagulated    3. Cough, unspecified type        PLAN  - close PCP follow up  - discharge to home    ED COURSE & MEDICAL DECISION MAKING    ED Course as of 04/17/23 0415   Mon Apr 17, 2023   0120 67yoF with history of paroxysmal a-fib/flutter (takes Eliquis, s/p ablation in 2020), HTN, hypothyroidism presenting for evaluation of palpitations. Reports mild palpitations when she went to bed around 10pm tonight; awoke at midnight with worsened palpitations with lightheadedness. Felt like a-fib to her. Unable to \"get myself out of it\" so came to the ED. Denies any chest pain or shortness of breath. States she has been taking her Eliquis as prescribed. States she is usually in normal sinus rhythm ever since her ablation in 2020. States she has been taking her medications (including metoprolol 50mg q24h) as prescribed. Notes she has had a mild cough the past day but no fevers, sweats, chills. Has no other complaints at this time.    BP 170s/100s on presentation with HR 120s and otherwise normal vitals. Calm on exam with clear lungs, normal work of breathing, benign abdomen, no peripheral edema or calf tenderness, normal neuro exam with clear mentation.    EKG with regular tachycardia at 121; suspect atrial flutter given history. Discussed options including rate vs rhythm control; patient opts for rhythm control with cardioversion which is reasonable to me. Will obtain screening blood tests and perform cardioversion under sedation. Patient comfortable with this plan; no further questions at this time.   0337 Cardioversion successful under " Patient would like a refill on       atorvastatin (LIPITOR) 20 MG tablet 90 tablet 1 6/24/2022     Sig - Route: TAKE 1 TABLET BY MOUTH DAILY - Oral    Sent to pharmacy as: Atorvastatin Calcium 20 MG Oral Tablet (LIPITOR)    Class: Eprescribe    Notes to Pharmacy: ZERO refills remain on this prescription. Your patient is requesting advance approval of refills for this medication to PREVENT ANY MISSED DOSES    E-Prescribing Status: Receipt confirmed by pharmacy (6/24/2022  8:13 AM CDT)      losartan (COZAAR) 50 MG tablet 180 tablet 1 6/24/2022     Sig - Route: TAKE 2 TABLETS BY MOUTH DAILY - Oral    Sent to pharmacy as: Losartan Potassium 50 MG Oral Tablet (COZAAR)    Class: Eprescribe    Notes to Pharmacy: ZERO refills remain on this prescription. Your patient is requesting advance approval of refills for this medication to PREVENT ANY MISSED DOSES      LOV 10/10/22 pain swelling in legs  Next 1/9/23 pre op  FLP done 9/13/22  Okay to refill?  Med loaded   sedation with 10mg IV etomidate. Converted to NSR; confirmed on repeat EKG.    Labs with mag 2.0, K 3.7 (replaced with goal >4), no MARJAN, no leukocytosis, no anemia, unremarkable LFTs, normal TSH. CXR clear with no acute abnormality or explanatory pathology for chronic cough; may be bronchitis. Doubt bacterial pneumonia and certainly no concern for sepsis. Doubt benefit to antibiotics at this time.    Patient asymptomatic on recheck with normal vitals. Patient able to tolerate PO and walk without difficulty. Patient comfortable with discharge at this time. Return precautions and need for PCP follow up discussed and understood. No further questions at the time of discharge.       --------------------------------------------------------------------------------   --------------------------------------------------------------------------------     1:25 AM I met with the patient for the initial interview and physical examination. Discussed plan for treatment and workup in the ED.      This patient involved a high degree of complexity in medical decision making, as significant risks were present and assessed. Recent encounters & results in medical record reviewed by me.    All workup (i.e. any EKG/labs/imaging as per charting below) reviewed and independently interpreted by me. See respective sections for details.    Broad differential considered for this patient presenting, including but not limited to:  Atrial flutter, atrial fibrillation, sinus tachycardia, other arrhythmia, anxiety, PE, ACS, sepsis, bacterial pneumonia, viral syndrome, chronic cough, dehydration, MARJAN, electrolyte derangement    I wore the following PPE during this patient encounter:  N95 mask, face shield w/ eye protection, gloves      See additional MDM below if interested.      MEDICATIONS GIVEN IN THE EMERGENCY DEPARTMENT  Medications   sodium chloride 0.9% infusion ( Intravenous $New Bag 4/17/23 014)   etomidate (AMIDATE) injection 10 mg (10 mg  Intravenous $Given 4/17/23 0150)   potassium chloride ER (KLOR-CON M) CR tablet 40 mEq (40 mEq Oral $Given 4/17/23 6320)       NEW PRESCRIPTIONS STARTED AT TODAY'S ER VISIT  Current Discharge Medication List      CONTINUE these medications which have NOT CHANGED    Details   apixaban ANTICOAGULANT (ELIQUIS ANTICOAGULANT) 5 MG tablet Take 1 tablet (5 mg) by mouth 2 times daily  Qty: 180 tablet, Refills: 1    Associated Diagnoses: Typical atrial flutter (H)      Cholecalciferol (VITAMIN D3 PO) Take 1 capsule by mouth daily      levothyroxine (SYNTHROID/LEVOTHROID) 75 MCG tablet TAKE 1 TABLET BY MOUTH ONCE DAILY AT 6AM  Qty: 90 tablet, Refills: 3    Associated Diagnoses: Hypothyroidism      metoprolol succinate ER (TOPROL XL) 50 MG 24 hr tablet TAKE 1 TABLET BY MOUTH TWICE A DAY  Qty: 180 tablet, Refills: 2    Associated Diagnoses: Typical atrial flutter (H)      multivitamin capsule [MULTIVITAMIN CAPSULE] Take 1 capsule by mouth daily.                 =================================================================      HPI  Patient information was obtained from: Patient    Use of : N/A        Claudia Colunga is a 67 year old female with a pertinent history of afib on Eliquis, DMII, HTN, anxiety, who presents to this ED via walk-in for evaluation of palpitations.    Patient endorses fluttering palpitations and light headedness that have been constant since she woke from sleep around 12 AM, with her heart rate in the 120s. She also notes a brief fluttering sensation around 9-10 PM before she went to bed. Patient report having brief episodes of palpitations over the last 2 years but nothing this prolonged. Patient also endorses a productive cough.    Patient denies chest pain, shortness of breath, and all other complaints at this time.      REVIEW OF SYSTEMS   Review of Systems   Respiratory: Positive for cough. Negative for shortness of breath.    Cardiovascular: Positive for palpitations. Negative for  chest pain.   Neurological: Positive for light-headedness.   All other systems reviewed and are negative.    All other systems reviewed and are negative except as noted above in HPI.        --------------- MEDICAL HISTORY ---------------  PAST MEDICAL HISTORY:  Reviewed independently by me.  Past Medical History:   Diagnosis Date     Anxiety      Atrial flutter (H)      Diabetes mellitus, type II (H)      Eosinophilic esophagitis      Essential hypertension      Hypothyroid      Overflow incontinence      Typical atrial flutter (H) 6/23/2020 8/25/2020 right CTI flutter ablation and developed atrial fib post  ablation      Patient Active Problem List   Diagnosis     Hypothyroidism     Essential hypertension     Anxiety     Vitamin D deficiency     Eosinophilic gastroenteritis     Type 2 diabetes mellitus without complication, without long-term current use of insulin (H)     Statin declined     Eosinophilic esophagitis     Paroxysmal atrial fibrillation (H)     Cough     Wheezing       PAST SURGICAL HISTORY:  Reviewed independently by me.  Past Surgical History:   Procedure Laterality Date     BIOPSY OF BREAST, INCISIONAL       BIOPSY OF BREAST, INCISIONAL       EP ABLATION AFLUTTER Right 8/25/2020    Procedure: EP Ablation Atrial Flutter;  Surgeon: Vick Adams MD;  Location: [facility] Cath Lab;  Service: Cardiology     OVARIAN CYST SURGERY         CURRENT MEDICATIONS:    Reviewed independently by me.    Current Facility-Administered Medications:      sodium chloride 0.9% infusion, , Intravenous, Continuous, Mitchell Goins MD, Last Rate: 100 mL/hr at 04/17/23 0144, New Bag at 04/17/23 0144    Current Outpatient Medications:      apixaban ANTICOAGULANT (ELIQUIS ANTICOAGULANT) 5 MG tablet, Take 1 tablet (5 mg) by mouth 2 times daily, Disp: 180 tablet, Rfl: 1     Cholecalciferol (VITAMIN D3 PO), Take 1 capsule by mouth daily, Disp: , Rfl:      levothyroxine (SYNTHROID/LEVOTHROID) 75 MCG tablet, TAKE 1  TABLET BY MOUTH ONCE DAILY AT 6AM, Disp: 90 tablet, Rfl: 3     metoprolol succinate ER (TOPROL XL) 50 MG 24 hr tablet, TAKE 1 TABLET BY MOUTH TWICE A DAY, Disp: 180 tablet, Rfl: 2     multivitamin capsule, [MULTIVITAMIN CAPSULE] Take 1 capsule by mouth daily., Disp: , Rfl:     ALLERGIES:  Reviewed independently by me.  Allergies   Allergen Reactions     Lisinopril Rash     Penicillins Rash       FAMILY HISTORY:  Reviewed independently by me.  Family History   Problem Relation Age of Onset     Dementia Mother 91     Hip fracture Mother 93        went to nursing home after this     Cerebrovascular Disease Mother 96        had to be fed by hand prior to her death     Colon Cancer Father 70     Aortic aneurysm Father         abdominal, repaired     Cerebral aneurysm Father      Peripheral Vascular Disease Father 96        ruptured femoral aneurysm?     Diabetes Type 2  Sister      Breast Cancer Maternal Cousin      Heart Disease No family hx of        SOCIAL HISTORY:   Reviewed independently by me.  Social History     Socioeconomic History     Marital status: Single     Number of children: 0   Tobacco Use     Smoking status: Never     Smokeless tobacco: Never   Substance and Sexual Activity     Alcohol use: Yes     Comment: Alcoholic Drinks/day: rare, only with dinner out with friends     Drug use: No   Social History Narrative    She walks her poodle daily for 30-60 minutes. She enjoys going to the [employer] or [employer] to paddle in the pool.       --------------- PHYSICAL EXAM ---------------  Nursing notes and vitals independently reviewed by me.  VITALS:  Vitals:    04/17/23 0258 04/17/23 0300 04/17/23 0315 04/17/23 0318   BP:  132/85  130/82   Pulse: 81 78 72 74   Resp: 17 13 10 11   Temp:       TempSrc:       SpO2: 96% 96% 96% 95%   Weight:       Height:           PHYSICAL EXAM:    General:  alert, interactive, no distress  Eyes:  conjunctivae clear, conjugate gaze  HENT:  atraumatic, nose with no  rhinorrhea, oropharynx clear  Neck:  no meningismus  Cardiovascular:  HR 120s during exam, regular rhythm, no murmurs, brisk cap refill  Chest:  no chest wall tenderness  Pulmonary:  no stridor, normal phonation, normal work of breathing, clear lungs bilaterally  Abdomen:  soft, nondistended, nontender  :  no CVA tenderness  Back:  no midline spinal tenderness  Musculoskeletal:  no pretibial edema, no calf tenderness. Gross ROM intact to joints of extremities with no obvious deformities.  Skin:  warm, dry, no rash  Neuro:  awake, alert, answers questions appropriately, follows commands, moves all limbs  Psych:  calm, normal affect      --------------- RESULTS ---------------  EKG #1 (obtained at 01:10):    Reviewed and interpreted by me.  - atrial flutter with RVR at 121bpm, no ST changes, unchanged RBBB with  (prior 154), , QTc 505  - new atrial flutter with RVR compared to prior NSR on 5/6/21  My read.    EKG #2 (obtained at 02:14):    Reviewed and interpreted by me.  - NSR at 82bpm, no ST or T wave changes, unchanged RBBB with , , QTc 488  - NSR has replaced atrial flutter from earlier tonight  My read.    LAB:  Reviewed and independently interpreted by me.  Results for orders placed or performed during the hospital encounter of 04/17/23   XR Chest Port 1 View    Impression    IMPRESSION: Negative chest.   Extra Blue Top Tube   Result Value Ref Range    Hold Specimen JIC    Extra Red Top Tube   Result Value Ref Range    Hold Specimen JIC    Basic metabolic panel   Result Value Ref Range    Sodium 141 136 - 145 mmol/L    Potassium 3.7 3.4 - 5.3 mmol/L    Chloride 107 98 - 107 mmol/L    Carbon Dioxide (CO2) 24 22 - 29 mmol/L    Anion Gap 10 7 - 15 mmol/L    Urea Nitrogen 15.1 8.0 - 23.0 mg/dL    Creatinine 0.72 0.51 - 0.95 mg/dL    Calcium 8.9 8.8 - 10.2 mg/dL    Glucose 159 (H) 70 - 99 mg/dL    GFR Estimate >90 >60 mL/min/1.73m2   Result Value Ref Range    Lipase 62 (H) 13 - 60 U/L    Hepatic function panel   Result Value Ref Range    Protein Total 7.2 6.4 - 8.3 g/dL    Albumin 4.4 3.5 - 5.2 g/dL    Bilirubin Total 0.4 <=1.2 mg/dL    Alkaline Phosphatase 70 35 - 104 U/L    AST 22 10 - 35 U/L    ALT 23 10 - 35 U/L    Bilirubin Direct <0.20 0.00 - 0.30 mg/dL   Result Value Ref Range    Magnesium 2.0 1.7 - 2.3 mg/dL   TSH with free T4 reflex   Result Value Ref Range    TSH 1.57 0.30 - 4.20 uIU/mL   CBC with platelets and differential   Result Value Ref Range    WBC Count 9.1 4.0 - 11.0 10e3/uL    RBC Count 4.65 3.80 - 5.20 10e6/uL    Hemoglobin 14.6 11.7 - 15.7 g/dL    Hematocrit 43.9 35.0 - 47.0 %    MCV 94 78 - 100 fL    MCH 31.4 26.5 - 33.0 pg    MCHC 33.3 31.5 - 36.5 g/dL    RDW 12.6 10.0 - 15.0 %    Platelet Count 306 150 - 450 10e3/uL    % Neutrophils 53 %    % Lymphocytes 34 %    % Monocytes 8 %    % Eosinophils 4 %    % Basophils 1 %    % Immature Granulocytes 0 %    NRBCs per 100 WBC 0 <1 /100    Absolute Neutrophils 4.8 1.6 - 8.3 10e3/uL    Absolute Lymphocytes 3.1 0.8 - 5.3 10e3/uL    Absolute Monocytes 0.7 0.0 - 1.3 10e3/uL    Absolute Eosinophils 0.4 0.0 - 0.7 10e3/uL    Absolute Basophils 0.1 0.0 - 0.2 10e3/uL    Absolute Immature Granulocytes 0.0 <=0.4 10e3/uL    Absolute NRBCs 0.0 10e3/uL         RADIOLOGY:  Reviewed and independently interpreted by me. Please see official radiology report.  Recent Results (from the past 24 hour(s))   XR Chest Port 1 View    Narrative    EXAM: XR CHEST PORT 1 VIEW  LOCATION: [facility]  DATE/TIME: 4/17/2023 2:52 AM CDT    INDICATION: cough  COMPARISON: 621      Impression    IMPRESSION: Negative chest.         PROCEDURES:   [facility]    -Cardioversion External    Date/Time: 4/17/2023 1:50 AM    Performed by: Mitchell Goins MD  Authorized by: Mitchell Goins MD    Risks, benefits and alternatives discussed.    ED EVALUATION:      Assessment Time: 4/17/2023 1:25 AM      I have performed an  Emergency Department Evaluation including taking a history and physical examination, this evaluation will be documented in the electronic medical record for this ED encounter.     Indication: atrial flutter with rapid ventricular response    ASA Class: Class 2- mild systemic disease, no acute problems, no functional limitations    Mallampati: Grade 2- soft palate, base of uvula, tonsillar pillars, and portion of posterior pharyngeal wall visible    NPO Status: yes and appropriately NPO for procedure    UNIVERSAL PROTOCOL   Site Marked: NA  Prior Images Obtained and Reviewed:  NA  Required items: Required blood products, implants, devices and special equipment available    Patient identity confirmed:  Verbally with patient and arm band  Patient was reevaluated immediately before administering moderate or deep sedation or anesthesia  Confirmation Checklist:  Patient's identity using two indicators and relevant allergies  Time out: Immediately prior to the procedure a time out was called    Universal Protocol: the Joint Commission Universal Protocol was followed      SEDATION  Patient Sedated: Yes    Sedation Type:  Deep  Sedation:  Etomidate (given 1:53am)  Vital signs: Vital signs monitored during sedation      PRE-PROCEDURE DETAILS:     Cardioversion basis:  Emergent    Rhythm:  Atrial flutter    Electrode placement:  Anterior-posterior  Attempt one:     Cardioversion mode:  Synchronous    Waveform:  Biphasic    Shock (Joules):  200    Shock outcome:  Conversion to normal sinus rhythm  Post-procedure details:     Patient status:  Awake      PROCEDURE    Patient Tolerance:  Patient tolerated the procedure well with no immediate complications  Length of time physician/provider present for 1:1 monitoring during sedation: 5     --------------------------------------------------------------------------------   Cardiac telemetry monitoring ordered, reviewed, and independently interpreted by me while patient was in the  Emergency Department. Revealed atrial flutter in 120s with subsequent NSR after cardioversion.  --------------------------------------------------------------------------------     Critical Care     Performed by:   Mitchell Goins MD   Authorized by:   Mitchell Goins MD  Total critical care time: 35 minutes (Critical care time was exclusive of separately billable procedures and treating other patients.)    Critical care was necessary to treat or prevent imminent or life-threatening deterioration of the following conditions: Atrial flutter with RVR requiring sedation with cardioversion    Critical care was time spent personally by me on the following activities:  - obtaining history from patient or surrogate  - examination of patient  - development of treatment plan with patient or surrogate  - ordering and performing treatments and interventions  - ordering and review of laboratory studies  - ordering and review of radiographic studies  - re-evaluation of patient's condition  - monitoring for potential decompensation  - discussion with consultants  ---------------------------------------------------------------------------------------------------------------------  ---------------------------------------------------------------------------------------------------------------------                --------------- ADDITIONAL MDM ---------------  History:  - Supplemental history from:       -- see above charting, if applicable: patient  - External Record(s) reviewed:       -- see above charting, if applicable       -- Inpatient/outpatient record (ablation from 2020), prior imaging (most recent echo)    Workup:  - Chart documentation above includes differential considered and any EKGs or imaging independently interpreted by provider.  - In additional to work up documented, I considered the following work up:       -- see above charting, if applicable    External Consultation:  - Discussion of management with another  provider:       -- see above charting, if applicable    Complicating Factors:  - Care impacted by chronic illness:       -- see above MDM, past medical history, & problem list  - Care affected by social determinants of health:       -- see above social history    Disposition Considerations:  - Discharge       -- I considered admission given that the patient came to the Emergency Department, but ultimately discharged the patient per decision making above       -- I recommended the patient continue their current prescription strength medication(s) as charted above in current medications list       -- I recommended over-the-counter medication(s) as charted above & in discharge instructions         I, Ronnell Encinas, am serving as a scribe to document services personally performed by Dr. Mitchell Goins based on my observation and the provider's statements to me. I, Mitchell Goins MD attest that Ronnell Encinas is acting in a scribe capacity, has observed my performance of the services and has documented them in accordance with my direction.      Mitchell Goins MD  04/17/23  Emergency Medicine  [facility] EMERGENCY DEPARTMENT  [address] [place] [id number]  [phone]  Dept: [phone]      Mitchell Goins MD  04/17/23 0415

## 2023-04-17 NOTE — ED TRIAGE NOTES
Pt arrives for evaluation of palpitations associated with chest pain. Pt states she was awaken from her sleep around 0000 with palpitations. Has a hx of Afib and is on Eliquis. States her heart rate has been in the 120's. Normally she is able to get herself out of it with vagal maneuvers. Feels lightheaded and nauseous. Denies any SOB.   Had an ablation in 2020   Triage Assessment     Row Name 04/17/23 0104       Triage Assessment (Adult)    Airway WDL WDL       Respiratory WDL    Respiratory WDL WDL       Skin Circulation/Temperature WDL    Skin Circulation/Temperature WDL WDL       Cardiac WDL    Cardiac WDL X;rhythm    Pulse Rate & Regularity tachycardic       Cognitive/Neuro/Behavioral WDL    Cognitive/Neuro/Behavioral WDL WDL

## 2023-04-17 NOTE — DISCHARGE INSTRUCTIONS
Continue all of your previously-prescribed medications.    Drink plenty of fluids to stay hydrated.    To decrease cough, you can try:  - honey (either spoonful or mixed in tea)  - over-the-counter Robitussin-DM cough syrup    Follow up with your Primary Care provider in 2 days for a recheck.    Return to the Emergency Department for any chest pain, difficulty breathing, new or worsening symptoms, or any other concerns.

## 2023-04-17 NOTE — TELEPHONE ENCOUNTER
patient was in ER this morning for cardioversion. She was told to see you this Wednesday in clinic for a follow up. You do not have anything that is long enough for this type of appointment. Do you want us to squeeze her into schedule somewhere or have her see Siri?  Please advise.

## 2023-04-19 ENCOUNTER — OFFICE VISIT (OUTPATIENT)
Dept: FAMILY MEDICINE | Facility: CLINIC | Age: 68
End: 2023-04-19
Payer: COMMERCIAL

## 2023-04-19 VITALS
RESPIRATION RATE: 16 BRPM | HEART RATE: 64 BPM | BODY MASS INDEX: 29.41 KG/M2 | OXYGEN SATURATION: 98 % | SYSTOLIC BLOOD PRESSURE: 121 MMHG | DIASTOLIC BLOOD PRESSURE: 74 MMHG | WEIGHT: 209.4 LBS

## 2023-04-19 DIAGNOSIS — R05.3 CHRONIC COUGH: ICD-10-CM

## 2023-04-19 DIAGNOSIS — I48.92 ATRIAL FLUTTER, UNSPECIFIED TYPE (H): Primary | ICD-10-CM

## 2023-04-19 LAB
ATRIAL RATE - MUSE: 60 BPM
DIASTOLIC BLOOD PRESSURE - MUSE: NORMAL MMHG
INTERPRETATION ECG - MUSE: NORMAL
P AXIS - MUSE: 11 DEGREES
PR INTERVAL - MUSE: 186 MS
QRS DURATION - MUSE: 148 MS
QT - MUSE: 446 MS
QTC - MUSE: 446 MS
R AXIS - MUSE: -52 DEGREES
SYSTOLIC BLOOD PRESSURE - MUSE: NORMAL MMHG
T AXIS - MUSE: 6 DEGREES
VENTRICULAR RATE- MUSE: 60 BPM

## 2023-04-19 PROCEDURE — 99213 OFFICE O/P EST LOW 20 MIN: CPT | Performed by: FAMILY MEDICINE

## 2023-04-19 PROCEDURE — 93010 ELECTROCARDIOGRAM REPORT: CPT | Performed by: INTERNAL MEDICINE

## 2023-04-19 PROCEDURE — 93005 ELECTROCARDIOGRAM TRACING: CPT | Performed by: FAMILY MEDICINE

## 2023-04-19 NOTE — PROGRESS NOTES
Assessment/ Plan     1. Atrial flutter, unspecified type (H)  Claudia is following up on an emergency room visit from 3 days ago.  She was seen for atrial flutter with rapid ventricular response.  She was cardioverted and responded well.  She has been asymptomatic since then.  Repeat EKG is normal today.  She has follow-up with cardiology in July.  She had a cardioversion about 2 years ago and this was her first issue that she has had.  We discussed that may be a wait-and-see what happens going forward.  She cannot think of any specific trigger that put her into atrial flutter.  - EKG 12-lead, tracing only    2. Chronic cough  She was having somewhat of chronic cough type symptoms prior to the atrial flutter but now is feeling better.  We speculated this may be from her esophagitis as she is not taking any medications currently.  If symptoms return, I would recommend starting an acid blocker.      Subjective:      Claudia Colunga is a 67 year old female who presents for hospital follow-up.  She was seen in the emergency room 3 days ago.  She states she was feeling a little bit of a flutter sensation when she was going to bed that night.  She then realized her heart rate was in the 120s or so.  She decided to go into the emergency room.  She was found to be in atrial flutter.  She states that she had an ablation done a couple of years ago and really had not had much of an issue since then.  She is fairly new to me but does follow with cardiology on a regular basis.  She cannot think of any specific triggers.  She had not been ill or particularly stressed.  It was not related to alcohol intake or dehydration.  She has not been feeling fine.  She states around that time she was almost feeling a little bit of indigestion and a type of a cough.  She was not sure if it was related to her hiatal hernia or reflux.  She is not currently taking any reflux medication but it is actually feeling better.    Relevant past medical,  family, surgical, and social history reviewed with patient, unless noted in HPI, not pertinent for this visit.  Medications were discussed and reconciled.   Review of Systems   A 12 point comprehensive review of systems was negative except as noted.      Current Outpatient Medications   Medication Sig Dispense Refill     apixaban ANTICOAGULANT (ELIQUIS ANTICOAGULANT) 5 MG tablet Take 1 tablet (5 mg) by mouth 2 times daily 180 tablet 1     Cholecalciferol (VITAMIN D3 PO) Take 1 capsule by mouth daily       levothyroxine (SYNTHROID/LEVOTHROID) 75 MCG tablet TAKE 1 TABLET BY MOUTH ONCE DAILY AT 6AM 90 tablet 3     metoprolol succinate ER (TOPROL XL) 50 MG 24 hr tablet TAKE 1 TABLET BY MOUTH TWICE A  tablet 2     multivitamin capsule [MULTIVITAMIN CAPSULE] Take 1 capsule by mouth daily.           Objective:     /74   Pulse 64   Resp 16   Wt 95 kg (209 lb 6.4 oz)   SpO2 98%   BMI 29.41 kg/m      Body mass index is 29.41 kg/m .       General appearance: alert, appears stated age and cooperative    Lungs: clear to auscultation bilaterally  Heart: regular rate and rhythm, S1, S2 normal, no murmur, click, rub or gallop         EKG shows normal sinus rhythm.       This note has been dictated using voice recognition software. Any grammatical or context distortions are unintentional and inherent to the software

## 2023-04-24 DIAGNOSIS — E03.9 HYPOTHYROIDISM: ICD-10-CM

## 2023-04-25 RX ORDER — LEVOTHYROXINE SODIUM 75 UG/1
TABLET ORAL
Qty: 90 TABLET | Refills: 3 | Status: SHIPPED | OUTPATIENT
Start: 2023-04-25 | End: 2024-04-09

## 2023-04-26 NOTE — TELEPHONE ENCOUNTER
"Last Written Prescription Date:  5/1/22  Last Fill Quantity: 90,  # refills: 3   Last office visit provider:  4/19/23    Requested Prescriptions   Pending Prescriptions Disp Refills     levothyroxine (SYNTHROID/LEVOTHROID) 75 MCG tablet [Pharmacy Med Name: LEVOTHYROXINE 75 MCG TABLET] 90 tablet 3     Sig: TAKE 1 TABLET BY MOUTH ONCE DAILY AT 6AM       Thyroid Protocol Passed - 4/24/2023 12:26 PM        Passed - Patient is 12 years or older        Passed - Recent (12 mo) or future (30 days) visit within the authorizing provider's specialty     Patient has had an office visit with the authorizing provider or a provider within the authorizing providers department within the previous 12 mos or has a future within next 30 days. See \"Patient Info\" tab in inbasket, or \"Choose Columns\" in Meds & Orders section of the refill encounter.              Passed - Medication is active on med list        Passed - Normal TSH on file in past 12 months     Recent Labs   Lab Test 04/17/23  0133   TSH 1.57              Passed - No active pregnancy on record     If patient is pregnant or has had a positive pregnancy test, please check TSH.          Passed - No positive pregnancy test in past 12 months     If patient is pregnant or has had a positive pregnancy test, please check TSH.               Sherlyn Eugene RN 04/25/23 11:18 PM  "

## 2023-05-04 ENCOUNTER — HOSPITAL ENCOUNTER (EMERGENCY)
Facility: HOSPITAL | Age: 68
Discharge: HOME | End: 2023-05-05
Attending: EMERGENCY MEDICINE | Admitting: EMERGENCY MEDICINE
Payer: COMMERCIAL

## 2023-05-04 DIAGNOSIS — I48.0 PAROXYSMAL ATRIAL FIBRILLATION (H): ICD-10-CM

## 2023-05-04 LAB
ANION GAP SERPL CALCULATED.3IONS-SCNC: 9 MMOL/L (ref 7–15)
BASOPHILS # BLD AUTO: 0.1 10E3/UL (ref 0–0.2)
BASOPHILS NFR BLD AUTO: 1 %
BUN SERPL-MCNC: 15.3 MG/DL (ref 8–23)
CALCIUM SERPL-MCNC: 9.2 MG/DL (ref 8.8–10.2)
CHLORIDE SERPL-SCNC: 105 MMOL/L (ref 98–107)
CREAT SERPL-MCNC: 0.81 MG/DL (ref 0.51–0.95)
DEPRECATED HCO3 PLAS-SCNC: 26 MMOL/L (ref 22–29)
EOSINOPHIL # BLD AUTO: 0.2 10E3/UL (ref 0–0.7)
EOSINOPHIL NFR BLD AUTO: 2 %
ERYTHROCYTE [DISTWIDTH] IN BLOOD BY AUTOMATED COUNT: 12.5 % (ref 10–15)
GFR SERPL CREATININE-BSD FRML MDRD: 79 ML/MIN/1.73M2
GLUCOSE SERPL-MCNC: 203 MG/DL (ref 70–99)
HCT VFR BLD AUTO: 42.4 % (ref 35–47)
HGB BLD-MCNC: 13.9 G/DL (ref 11.7–15.7)
IMM GRANULOCYTES # BLD: 0.2 10E3/UL
IMM GRANULOCYTES NFR BLD: 2 %
LYMPHOCYTES # BLD AUTO: 1.6 10E3/UL (ref 0.8–5.3)
LYMPHOCYTES NFR BLD AUTO: 16 %
MAGNESIUM SERPL-MCNC: 2.1 MG/DL (ref 1.7–2.3)
MCH RBC QN AUTO: 31.2 PG (ref 26.5–33)
MCHC RBC AUTO-ENTMCNC: 32.8 G/DL (ref 31.5–36.5)
MCV RBC AUTO: 95 FL (ref 78–100)
MONOCYTES # BLD AUTO: 0.6 10E3/UL (ref 0–1.3)
MONOCYTES NFR BLD AUTO: 6 %
NEUTROPHILS # BLD AUTO: 7.5 10E3/UL (ref 1.6–8.3)
NEUTROPHILS NFR BLD AUTO: 73 %
NRBC # BLD AUTO: 0 10E3/UL
NRBC BLD AUTO-RTO: 0 /100
PLATELET # BLD AUTO: 298 10E3/UL (ref 150–450)
POTASSIUM SERPL-SCNC: 4.5 MMOL/L (ref 3.4–5.3)
RBC # BLD AUTO: 4.46 10E6/UL (ref 3.8–5.2)
SODIUM SERPL-SCNC: 140 MMOL/L (ref 136–145)
WBC # BLD AUTO: 10.1 10E3/UL (ref 4–11)

## 2023-05-04 PROCEDURE — 80048 BASIC METABOLIC PNL TOTAL CA: CPT | Performed by: EMERGENCY MEDICINE

## 2023-05-04 PROCEDURE — 36415 COLL VENOUS BLD VENIPUNCTURE: CPT | Performed by: EMERGENCY MEDICINE

## 2023-05-04 PROCEDURE — 83735 ASSAY OF MAGNESIUM: CPT | Performed by: EMERGENCY MEDICINE

## 2023-05-04 PROCEDURE — 99284 EMERGENCY DEPT VISIT MOD MDM: CPT

## 2023-05-04 PROCEDURE — 85004 AUTOMATED DIFF WBC COUNT: CPT | Performed by: EMERGENCY MEDICINE

## 2023-05-04 PROCEDURE — 93005 ELECTROCARDIOGRAM TRACING: CPT | Performed by: EMERGENCY MEDICINE

## 2023-05-04 RX ORDER — ETOMIDATE 2 MG/ML
10 INJECTION INTRAVENOUS ONCE
Status: DISCONTINUED | OUTPATIENT
Start: 2023-05-04 | End: 2023-05-05 | Stop reason: HOSPADM

## 2023-05-04 ASSESSMENT — ENCOUNTER SYMPTOMS
LIGHT-HEADEDNESS: 1
PALPITATIONS: 1

## 2023-05-04 ASSESSMENT — ACTIVITIES OF DAILY LIVING (ADL): ADLS_ACUITY_SCORE: 35

## 2023-05-05 VITALS
HEART RATE: 82 BPM | TEMPERATURE: 99.2 F | OXYGEN SATURATION: 95 % | RESPIRATION RATE: 14 BRPM | BODY MASS INDEX: 29.88 KG/M2 | SYSTOLIC BLOOD PRESSURE: 129 MMHG | WEIGHT: 212.7 LBS | DIASTOLIC BLOOD PRESSURE: 79 MMHG

## 2023-05-05 LAB — HOLD SPECIMEN: NORMAL

## 2023-05-05 NOTE — ED PROVIDER NOTES
Patient discharged at 0010 to home; family providing transportation. Patient provided with all discharge paperwork and educational material. All questions answered to patient's satisfaction.

## 2023-05-05 NOTE — ED PROVIDER NOTES
EMERGENCY DEPARTMENT ENCOUNTER     NAME: Claudia Colunga   AGE: 67 year old female   YOB: 1955   MRN: [id number]   EVALUATION DATE & TIME: No admission date for patient encounter.   PCP: Pamella Abraham     Chief Complaint   Patient presents with     Chest Pain     Tachycardia   :    FINAL IMPRESSION       1. Paroxysmal atrial fibrillation (H)           ED COURSE & MEDICAL DECISION MAKING      9:47 PM I met with patient for initial interview and encounter. PPE worn includes surgical mask and exam gloves.   10:10 PM Went to consent the patient for cardioversion but found her to be at normal sinus on the monitor.   11:28 PM Rechecked and updated the patient about lab results. She feels much better and is still in sinus rhythm. We discussed plans for discharge.     Pertinent Labs & Imaging studies reviewed. (See chart for details)   67 year old female  presents to the Emergency Department for evaluation of palpitations.  On chart review, patient had a recent visit to the ED for paroxysmal atrial fibrillation at which time she was cardioverted and then discharged in sinus rhythm.  She is anticoagulated and noticed today the sudden onset of palpitations and lightheadedness again. Initial Vitals Reviewed. Initial exam notable for patient who initially had irregularly irregular tachycardia with otherwise normal vitals.  Initial EKG confirms that she is once again in atrial fibrillation.  I did labs to look for an inciting factor like electrolyte disturbance, anemia and these are negative.  We discussed options including rate control versus cardioversion and patient agreed to cardioversion.  When I went to consent her for the procedure, I found that after she had been moved to an exam room she spontaneously converted to sinus rhythm which was confirmed by EKG.  I kept her on cardiac monitoring for about 2 hours with no recurrence, and she is otherwise comfortable with discharge at this time.  Due to 2 episodes  of paroxysmal A-fib within just a couple of weeks I have placed a referral to rapid access cardiology for the patient to see them as an outpatient and see if any further outpatient medication management is warranted.  She is comfortable with discharge with return precautions at this time.           At the conclusion of the encounter I discussed the results of all of the tests and the disposition. The questions were answered. The patient or family acknowledged understanding and was agreeable with the care plan.         Medical Decision Making    History:    Supplemental history from: Documented in chart, if applicable    External Record(s) reviewed: Documented in chart, if applicable. and Other: Reviewed [facility] ED visit on 4/17/23    Work Up:    Chart documentation includes differential considered and any EKGs or imaging independently interpreted by provider, where specified.    In additional to work up documented, I considered the following work up: Documented in chart, if applicable. and Other: Cardioversion, but patient spontaneously converted    External consultation:    Discussion of management with another provider: Documented in chart, if applicable    Complicating factors:    Care impacted by chronic illness: Diabetes, Heart Disease and Hypertension    Care affected by social determinants of health: N/A    Disposition considerations: Discharge. No recommendations on prescription strength medication(s). I considered admission, but ultimately discharged patient with reassuring exam and negative work-up..      MEDICATIONS GIVEN IN THE EMERGENCY:   Medications   sodium chloride (PF) 0.9% PF flush 3 mL (has no administration in time range)   sodium chloride (PF) 0.9% PF flush 3 mL (has no administration in time range)   etomidate (AMIDATE) injection 10 mg (has no administration in time range)      NEW PRESCRIPTIONS STARTED AT TODAY'S ER VISIT   New Prescriptions    No medications on file      ================================================================   HISTORY OF PRESENT ILLNESS       Patient information was obtained from: Patient    Use of Intrepreter: N/A    Claudia Colunga is a 67 year old female with history of typical atrial flutter, DMII, anxiety, HTN, who presents via walk in for evaluation of chest pain     Per Chart Review: The patient presented to [facility] ED on 4/17/23 for palpitation. Patient endorses fluttering palpitations and light headedness that have been constant. Patient report having brief episodes of palpitations over the last 2 years but nothing this prolonged. EKG with regular tachycardia at 121. The patient was cardioverted with normal vitals.     The patient present with palpitation that started around 8PM. She is anticoagulated on Eliquis. She explain having mid chest pain that is a dull sensation. She endorses lightheaded. Denies any other complaints or concerns at the moment.     ================================================================    REVIEW OF SYSTEMS       Review of Systems   Cardiovascular: Positive for chest pain (mid) and palpitations.   Neurological: Positive for light-headedness.   All other systems reviewed and are negative.       PAST HISTORY     PAST MEDICAL HISTORY:   Past Medical History:   Diagnosis Date     Anxiety      Atrial flutter (H)      Diabetes mellitus, type II (H)      Eosinophilic esophagitis      Essential hypertension      Hypothyroid      Overflow incontinence      Typical atrial flutter (H) 6/23/2020 8/25/2020 right CTI flutter ablation and developed atrial fib post  ablation       PAST SURGICAL HISTORY:   Past Surgical History:   Procedure Laterality Date     BIOPSY OF BREAST, INCISIONAL       BIOPSY OF BREAST, INCISIONAL       EP ABLATION AFLUTTER Right 8/25/2020    Procedure: EP Ablation Atrial Flutter;  Surgeon: Vick Adams MD;  Location: Health system;  Service: Cardiology     OVARIAN CYST SURGERY         CURRENT MEDICATIONS:   apixaban ANTICOAGULANT (ELIQUIS ANTICOAGULANT) 5 MG tablet  Cholecalciferol (VITAMIN D3 PO)  levothyroxine (SYNTHROID/LEVOTHROID) 75 MCG tablet  metoprolol succinate ER (TOPROL XL) 50 MG 24 hr tablet  multivitamin capsule      ALLERGIES:   Allergies   Allergen Reactions     Lisinopril Rash     Penicillins Rash      FAMILY HISTORY:   Family History   Problem Relation Age of Onset     Dementia Mother 91     Hip fracture Mother 93        went to nursing home after this     Cerebrovascular Disease Mother 96        had to be fed by hand prior to her death     Colon Cancer Father 70     Aortic aneurysm Father         abdominal, repaired     Cerebral aneurysm Father      Peripheral Vascular Disease Father 96        ruptured femoral aneurysm?     Diabetes Type 2  Sister      Breast Cancer Maternal Cousin      Heart Disease No family hx of       SOCIAL HISTORY:   Social History     Socioeconomic History     Marital status: Single     Number of children: 0   Tobacco Use     Smoking status: Never     Smokeless tobacco: Never   Substance and Sexual Activity     Alcohol use: Yes     Comment: Alcoholic Drinks/day: rare, only with dinner out with friends     Drug use: No   Social History Narrative    She walks her poodle daily for 30-60 minutes. She enjoys going to the [employer] or [email or web address] to paddle in the pool.        VITALS  Patient Vitals for the past 24 hrs:   BP Temp Temp src Pulse Resp SpO2 Weight   05/04/23 2131 119/84 99.2  F (37.3  C) Temporal 114 16 96 % 96.5 kg (212 lb 11.2 oz)        ================================================================    PHYSICAL EXAM     VITAL SIGNS: /84   Pulse 114   Temp 99.2  F (37.3  C) (Temporal)   Resp 16   Wt 96.5 kg (212 lb 11.2 oz)   SpO2 96%   BMI 29.88 kg/m     Constitutional:  Awake, no acute distress   HENT:  Atraumatic, oropharynx without exudate or erythema, membranes moist  Lymph:  No adenopathy  Eyes: EOM intact, PERRL, no  injection  Neck: Supple  Respiratory:  Clear to auscultation bilaterally, no wheezes or crackles   Cardiovascular:  Irregularly irregular tachycardia.   GI:  Soft, nontender, nondistended, no rebound or guarding   Musculoskeletal:  Moves all extremities, no lower extremity edema, no deformities    Skin:  Warm, dry  Neurologic:  Alert and oriented x3, no focal deficits noted       ================================================================  LAB       All pertinent labs reviewed and interpreted.   Labs Ordered and Resulted from Time of ED Arrival to Time of ED Departure   BASIC METABOLIC PANEL - Abnormal       Result Value    Sodium 140      Potassium 4.5      Chloride 105      Carbon Dioxide (CO2) 26      Anion Gap 9      Urea Nitrogen 15.3      Creatinine 0.81      Calcium 9.2      Glucose 203 (*)     GFR Estimate 79     MAGNESIUM - Normal    Magnesium 2.1     CBC WITH PLATELETS AND DIFFERENTIAL    WBC Count 10.1      RBC Count 4.46      Hemoglobin 13.9      Hematocrit 42.4      MCV 95      MCH 31.2      MCHC 32.8      RDW 12.5      Platelet Count 298      % Neutrophils 73      % Lymphocytes 16      % Monocytes 6      % Eosinophils 2      % Basophils 1      % Immature Granulocytes 2      NRBCs per 100 WBC 0      Absolute Neutrophils 7.5      Absolute Lymphocytes 1.6      Absolute Monocytes 0.6      Absolute Eosinophils 0.2      Absolute Basophils 0.1      Absolute Immature Granulocytes 0.2      Absolute NRBCs 0.0          ===============================================================  RADIOLOGY       Reviewed all pertinent imaging. Please see official radiology report.   No orders to display         ================================================================  EKG     EKG reviewed interpreted by me shows atrial fibrillation with rapid ventricular response, rate of 127, normal axis, QTc 502 with no acute ST or T wave changes    Repeat EKG reviewed interpreted by me shows sinus rhythm with bifascicular  block, rate of 93, normal axis, QTc 494 with no acute ST or T wave changes since previous    I have independently reviewed and interpreted the EKG(s) documented above.     ================================================================  PROCEDURES       I, Tiffany Hull, am serving as a scribe to document services personally performed by Dr. Saez based on my observation and the provider's statements to me. I, Delmis Saez MD attest that Tiffany Hull is acting in a scribe capacity, has observed my performance of the services and has documented them in accordance with my direction.     Delmis Saez M.D.   Emergency Medicine   [facility] EMERGENCY DEPARTMENT  [facility] [facility] [id number]  [phone]  Dept: [phone]        Delmis Saez MD  05/04/23 6125

## 2023-05-05 NOTE — ED TRIAGE NOTES
Pt started feeling palpitations an hour ago. Pt has a history of a-flutter and was cardioverted two weeks ago. Pt is on eliquis. Pt has dull, midsternal chest pain     Triage Assessment     Row Name 05/04/23 2132       Triage Assessment (Adult)    Airway WDL WDL       Respiratory WDL    Respiratory WDL WDL       Skin Circulation/Temperature WDL    Skin Circulation/Temperature WDL WDL       Cardiac WDL    Cardiac WDL X;chest pain       Chest Pain Assessment    Chest Pain Location midsternal    Character --  dull       Peripheral/Neurovascular WDL    Peripheral Neurovascular WDL WDL       Cognitive/Neuro/Behavioral WDL    Cognitive/Neuro/Behavioral WDL WDL

## 2023-05-11 ENCOUNTER — TELEPHONE (OUTPATIENT)
Dept: CARDIOLOGY | Facility: CLINIC | Age: 68
End: 2023-05-11

## 2023-05-11 ENCOUNTER — OFFICE VISIT (OUTPATIENT)
Dept: CARDIOLOGY | Facility: CLINIC | Age: 68
End: 2023-05-11
Attending: EMERGENCY MEDICINE
Payer: COMMERCIAL

## 2023-05-11 VITALS
HEART RATE: 76 BPM | RESPIRATION RATE: 14 BRPM | WEIGHT: 207 LBS | HEIGHT: 71 IN | DIASTOLIC BLOOD PRESSURE: 76 MMHG | SYSTOLIC BLOOD PRESSURE: 124 MMHG | BODY MASS INDEX: 28.98 KG/M2

## 2023-05-11 DIAGNOSIS — I48.0 PAROXYSMAL ATRIAL FIBRILLATION (H): Primary | ICD-10-CM

## 2023-05-11 PROCEDURE — 99215 OFFICE O/P EST HI 40 MIN: CPT | Performed by: INTERNAL MEDICINE

## 2023-05-11 NOTE — PROGRESS NOTES
[facility]  Cardiac Electrophysiology  [address]  [place] [address]   Office: [phone]  Fax: [phone]     Cardiac Electrophysiology Consultation    Patient: Claudia Colunga   : 1955     Referring Provider: Barb Paulino MD  Primary Care Provider: Pamella Abraham MD    CHIEF COMPLAINT/REASON FOR CONSULTATION  Paroxysmal atrial fibrillation and atrial flutter    Assessment/Recommendations   Claudia Colunga is a 67 year old female with paroxysmal atrial fibrillation and atrial flutter, typical atrial flutter with prior CTI ablation (2020), HTN, diet controlled T2DM, hypothyroidism referred by Dr. Paulino for consultation regarding atrial fibrillation and atria flutter.    Paroxysmal atrial fibrillation and atrial flutter - symptomatic with palpitations.  Prior CTI ablation 2020  [id number] 4  We reviewed atrial fibrillation and atrial flutter physiology and management considerations including managing stroke risk, rate control, cardioversion, antiarrhythmic drug therapy, and catheter ablation. We discussed atrial fibrillation and flutter ablation procedures, anticipated success rates, the potential need for re-do ablation vs addition of anti-arrhythmic drugs, procedural risks (including groin bleeding, tamponade, phrenic or esophageal injury, stroke, pulmonary vein stenosis) and recovery expectations.  She would prefer catheter ablation - she has done research including consideration regarding VoM EtOH injection.  She would prefer to avoid long term antiarrhythmic medications.  - TTE  - PVI, atrial flutter mapping and ablation, general anesthesia, continue apixaban, hold metoprolol XL 3 days prior  - continue metoprolol XL 50mg daily  - continue apixaban 5mg twice daily  - we discussed the ongoing importance of lifestyle modification (maintaining a healthy weight, sleep apnea diagnosis and management, alcohol avoidance) as part of a long term  "strategy for atrial fibrillation management      Follow up: as above         History of Present Illness   Claudia Colunga is a 67 year old female with paroxysmal atrial fibrillation and atrial flutter, typical atrial flutter with prior CTI ablation (8/25/2020), HTN, diet controlled T2DM, hypothyroidism referred by Dr. Paulino for consultation regarding atrial fibrillation and atria flutter.    Mrs. Colunga developed atrial flutter and underwent CTI ablation 8/25/2020.  She has had ER visits 4/17/2023 with atrial flutter and ventricular rate 121bpm treated with DCCV and 5/4/2023 with atrial fibrillation which spontaneously converted to sinus rhythm.    She is anxious regarding recurrent episodes of atrial fibrillation and would like suppress these as definitively as possible.       Physical Examination  Review of Systems   VITALS: /76 (BP Location: Right arm, Patient Position: Sitting, Cuff Size: Adult Large)   Pulse 76   Resp 14   Ht 1.803 m (5' 11\")   Wt 93.9 kg (207 lb)   BMI 28.87 kg/m    Wt Readings from Last 3 Encounters:   05/04/23 96.5 kg (212 lb 11.2 oz)   04/19/23 95 kg (209 lb 6.4 oz)   04/17/23 95.3 kg (210 lb)     CONSTITUTIONAL: well nourished, comfortable, no distress  EYES:  Conjunctivae pink, sclerae clear.    E/N/T:  Oral mucosa pink  RESPIRATORY:  Respiratory effort is normal  CARDIOVASCULAR:  normal S1 and S2  GASTROINTESTINAL:  Abdomen without masses or tenderness  EXTREMITIES:  No clubbing or cyanosis.    MUSCULOSKELETAL:  Overall grossly normal muscle strength  SKIN:  Overall, skin warm and dry, no lesions.  NEURO/PSYCH:  Oriented x 3 with normal affect.   Constitutional:  No weight loss or loss of appetite    Eyes:  No difficulty with vision, no double vision, no dry eyes  ENT:  No sore throat, difficulty swallowing; changes in hearing or tinnitus  Cardiovascular: As detailed above  Respiratory:  No cough  Musculoskeletal  No joint pain, muscle aches  Neurologic:  No syncope, " lightheadedness, fainting spells   Hematologic: No easy bruising, excessive bleeding tendency   Gastrointestinal:  No jaundice, abdominal pain or abdominal bloating  Genitourinary: No changes in urinary habits, no trouble urinating    Psychiatric: No anxiety or depression      Medical History  Surgical History   Past Medical History:   Diagnosis Date     Anxiety      Atrial flutter (H)      Diabetes mellitus, type II (H)      Eosinophilic esophagitis      Essential hypertension      Hypothyroid      Overflow incontinence      Typical atrial flutter (H) 6/23/2020 8/25/2020 right CTI flutter ablation and developed atrial fib post  ablation     Past Surgical History:   Procedure Laterality Date     BIOPSY OF BREAST, INCISIONAL       BIOPSY OF BREAST, INCISIONAL       EP ABLATION AFLUTTER Right 8/25/2020    Procedure: EP Ablation Atrial Flutter;  Surgeon: Vick Adams MD;  Location: [facility];  Service: Cardiology     OVARIAN CYST SURGERY           Family History Social History   Family History   Problem Relation Age of Onset     Dementia Mother 91     Hip fracture Mother 93        went to nursing home after this     Cerebrovascular Disease Mother 96        had to be fed by hand prior to her death     Colon Cancer Father 70     Aortic aneurysm Father         abdominal, repaired     Cerebral aneurysm Father      Peripheral Vascular Disease Father 96        ruptured femoral aneurysm?     Diabetes Type 2  Sister      Breast Cancer Maternal Cousin      Heart Disease No family hx of         Social History     Tobacco Use     Smoking status: Never     Smokeless tobacco: Never   Substance Use Topics     Alcohol use: Yes     Comment: Alcoholic Drinks/day: rare, only with dinner out with friends     Drug use: No         Medications  Allergies     Current Outpatient Medications:      apixaban ANTICOAGULANT (ELIQUIS ANTICOAGULANT) 5 MG tablet, Take 1 tablet (5 mg) by mouth 2 times daily, Disp: 180 tablet, Rfl:  1     Cholecalciferol (VITAMIN D3 PO), Take 1 capsule by mouth daily, Disp: , Rfl:      levothyroxine (SYNTHROID/LEVOTHROID) 75 MCG tablet, TAKE 1 TABLET BY MOUTH ONCE DAILY AT 6AM, Disp: 90 tablet, Rfl: 3     metoprolol succinate ER (TOPROL XL) 50 MG 24 hr tablet, TAKE 1 TABLET BY MOUTH TWICE A DAY, Disp: 180 tablet, Rfl: 2     multivitamin capsule, [MULTIVITAMIN CAPSULE] Take 1 capsule by mouth daily., Disp: , Rfl:      Allergies   Allergen Reactions     Lisinopril Rash     Penicillins Rash          Lab Results    Chemistry CBC Cardiac Enzymes/BNP/TSH/INR   Recent Labs   Lab Test 05/04/23  2255      POTASSIUM 4.5   CHLORIDE 105   CO2 26   *   BUN 15.3   CR 0.81   GFRESTIMATED 79   AMARJIT 9.2     Recent Labs   Lab Test 05/04/23 2255 04/17/23 0133 12/07/22  1131   CR 0.81 0.72 0.70          Recent Labs   Lab Test 05/04/23 2255   WBC 10.1   HGB 13.9   HCT 42.4   MCV 95        Recent Labs   Lab Test 05/04/23 2255 04/17/23  0133 12/07/22  1131   HGB 13.9 14.6 14.2    Recent Labs   Lab Test 05/06/21  0251 06/23/20  0222   TROPONINI <0.01 <0.01     No results for input(s): BNP, NTBNPI, NTBNP in the last 23833 hours.  Recent Labs   Lab Test 04/17/23  0133   TSH 1.57     No results for input(s): INR in the last 39755 hours.      Data Review    ECGs (tracings independently reviewed)  5/4/2023 (post DCCV) - SR 93bpm, RBBB and LAFB QRS 150ms  5/4/2023 - AF, ventricular rate 127bpm, RBBB and LAFB  4/19/2023 - SR 60bpm, RBBB and LAFB  4/17/2023 - AFl, 2:1 AV conduction, ventricular rate 121bpm      11/2020 cardiac monitoring (independently reviewed)  1.  Normal multiday monitor with rare singular noncomplex ectopy.  2.  Although a bundle branch block pattern is present, no high-grade AV block noted.    8/5/2021 TTE  Left ventricular size, wall motion and function are normal. The ejection  fraction is 60-65%.  There is mild to moderate concentric left ventricular hypertrophy.  The left atrium is borderline  dilated.  No significant valve abnormalities       Cc: Barb Paulino MD, Pamella Abraham MD Amila Dilusha William, MD  5/11/2023  2:43 PM

## 2023-05-11 NOTE — PATIENT INSTRUCTIONS
[facility]  Cardiac Electrophysiology  [address]  [place] [address]   Office: [phone]  Fax: [phone]       Thank you for seeing us in clinic today - it is a pleasure to be a part of your care team.  Below is a summary of our plan from today's visit.       You have paroxysmal atrial fibrillation and atrial flutter, presently being managed with metoprolol XL and apixaban.  We reviewed physiology and management options including antiarrhythmic drug therapy, catheter ablation and lifestyle modification.  We will plan for the following:  - our office will work with you to coordinate atrial fibrillation and atrial flutter ablation  - continue metoprolol XL 50mg daily  - continue apixaban 5mg twice daily     Please do not hesitate to be in touch with our office at [phone] with any questions that may arise.       Thank you for trusting us with your care,    Kunal Bower MD  Clinical Cardiac Electrophysiology  [facility]  [address]  [place] [address]   Office: [phone]  Fax: [phone]              ATRIAL FIBRILLATION: Patient Information    What is atrial fibrillation?  Atrial fibrillation (AF, A-fib) is a common heart rhythm problem (arrhythmia) occurring within the upper chambers of the heart (the atria).  In normal rhythm, the upper and lower chambers of the heart are electrically driven to contract in a coordinated sequence.  In atrial fibrillation, the atria lose their ability to contract because of rapid and chaotic electrical activity.  The lower chambers of the heart (the ventricles) continue to pump blood throughout the body, though with irregular and often faster rate due to the chaotic activity within the atria.        How do I know if I have atrial fibrillation?   Some people may feel their heart beating faster, harder, or irregularly while in atrial fibrillation.  Others may be lightheaded,  fatigued, feel weak or tired or become more short of breath especially with activities.  Some patients have no symptoms at all.  Atrial fibrillation may be found due to an irregular pulse or on an electrocardiogram (ECG). Atrial fibrillation can start and stop on its own, and episodes can last from seconds to several months.      How common is atrial fibrillation?   An estimated 3-6 million people in the [place] have atrial fibrillation.  Atrial fibrillation is a common heart rhythm problem for older persons, affecting as estimated 12-15% of people over the age of 65 years of age.    What causes atrial fibrillation?   Age is the most important risk factor for atrial fibrillation.  Atrial fibrillation is more common in people with other heart disease, high blood pressure, diabetes, obesity, sleep apnea and in people who regularly consume alcohol.  Surgery, lung disease, or thyroid problems can lead to atrial fibrillation.  Atrial fibrillation has multiple possible causes, and in most cases a single cause cannot be found.  Atrial fibrillation is a progressive condition, usually starting with at an early stage with short and infrequent episodes.  In later stages of disease, more frequent and longer lasting episodes of atrial fibrillation occur, ultimately culminating in episodes which do not spontaneously terminate.  Generally, more enlargement and scarring within the upper chambers of the heart is observed as atrial fibrillation progresses from early to late-stage disease.    How is atrial fibrillation diagnosed and evaluated?    Because of its start-stop nature, atrial fibrillation can be challenging to diagnose.  Atrial fibrillation is most commonly diagnosed via cardiac rhythm recordings - either an ECG or wearable cardiac rhythm monitor.  For patients with pacemakers, defibrillators or implantable loop recorders, atrial fibrillation may be recorded via these devices.  Recently, commercially available devices  (eg. Apple Watch, Stereotypesa device, certain FitBit devices, others) can allow patients to take 30 second cardiac rhythm recordings which may document atrial fibrillation.  Once atrial fibrillation is diagnosed, additional tests include blood tests and an echocardiogram.  The echocardiogram uses ultrasound to look at your heart to assess your cardiac function and evaluate for other heart disease.  Additional evaluation may include CT or MRI studies.    Is atrial fibrillation dangerous?   Atrial fibrillation is not usually a life-threatening arrhythmia.  The most serious consequences of atrial fibrillation including stroke and worsening of overall cardiac function.  While in atrial fibrillation, the upper cardiac chambers do not contract normally, resulting in slower blood flow and increased risk of clot formation.  If this blood clot becomes detached from the heart a stroke can occur.  Unfortunately, stroke can be the first sign of atrial fibrillation for some people.  With a stroke, you may notice abnormal sensation, weakness on one side of the body or face, changes in your vision or speech.  If you have any of these signs, you should contact EMS and be evaluated in an emergency room as soon as possible.      How is atrial fibrillation treated?     Several treatment options exist for suppressing atrial fibrillation - however, it is not an easily curable arrhythmia.  The first goal in managing atrial fibrillation is to minimize stroke risk.  The second goal is to improve symptoms associated with atrial fibrillation.  Finally, in patients with reduced cardiac function, maintaining normal rhythm can help improve cardiac function.      Blood thinners are used to reduce the risk of stroke in patients with high estimated stroke risk related to atrial fibrillation.  For patients at higher risk of bleeding related to blood thinner use, implantable devices may be an option to reduce stroke risk without the need for long term  blood thinner use.      Atrial fibrillation can be managed via two strategies: rate control and rhythm control.  In a rate control strategy, continued atrial fibrillation is accepted and medications (eg. beta-blockers or calcium channel blockers) are used to control the lower chamber rate.  In a rhythm control strategy, anti-arrhythmic medications or catheter ablation are used to maintain normal cardiac rhythm and slow disease progression by suppressing atrial fibrillation.  A procedure called a cardioversion, in which an electric shock is delivered through patches placed on the chest wall while under deep sedation, can be performed to temporarily restore normal cardiac rhythm, though does not address the chance of atrial fibrillation recurrence.  Treatments are more effective for earlier rather than later stage atrial fibrillation.  Lifestyle modifications (maintaining a healthy weight, aerobic exercise, diagnosing and treating sleep apnea, and minimizing alcohol intake) are important elements of atrial fibrillation rhythm control.     What is catheter ablation for atrial fibrillation?  Cardiac catheter ablation is a commonly performed, minimally invasive procedure performed by a cardiac electrophysiologist to treat many different cardiac rhythm abnormalities.  During catheter ablation, long, thin, flexible tubes are advanced into the heart via small sheaths inserted into the femoral veins and thermal energy (either heating or cooling) is applied within the heart to disrupt abnormal electrical activity.  Atrial fibrillation ablation is performed under general anesthesia, with procedures generally taking approximately 2-3 hours.  Patients are typically observed for 3-5 hours after the ablation, and in most cases can be discharged home the same day.  Atrial fibrillation ablation is associated with better outcomes (mortality, cardiovascular hospitalizations, atrial arrhythmia recurrences) compared to antiarrhythmic  drug therapy.  However, atrial fibrillation recurrences are not uncommon, and repeat catheter ablation may be offered.  Your electrophysiology team can review atrial fibrillation ablation, anticipated success rates, risks, and recovery expectations with you.    What are anti-arrhythmic medications?  Anti-arrhythmic medications are specialized drugs which alter cardiac electrical functioning to suppress arrhythmias.  There are several anti-arrhythmic medications available, each with its own success rate and side effects.  Some anti-arrhythmic medications are less effective though safer to use, others are more effective though have serious potential toxicities.  Atrial fibrillation recurrences are common and may require dose adjustment or change in antiarrhythmic therapy.  Your electrophysiology team will carefully consider which medication would be the best and safest for your particular case.      Can I live a normal life?    The goal of atrial fibrillation management is for patients to live normal lives without being limited by symptoms related to atrial fibrillation.    Are any additional educational resources available?  There are a number of excellent atrial fibrillation education resources available to you online.  A few options you may wish to review include:  hrsonline.org/guide-atrial-fibrillation  afibmatters.org  getsmartaboutafib.com  stopaf.com    What comes next?    Consider your management options and let us know how we can help in your decision process.  Please take medications as they have been prescribed.  You should also get any tests that may have been ordered for you.      When to Call Your Doctor or seek emergency care:  Call your doctor or seek emergency care if you have any significant changes with the following:  Weakness  Dizziness  Fainting  Fatigue  Shortness of breath  Chest pain with increased activity  If you are concerned that your heart rate is too fast or too slow  Bleeding that does  not stop in 10 minutes  Coughing or throwing up blood  Bloody diarrhea or bleeding hemorrhoids  Dark-colored urine or black stool  Allergic reactions:  Rash  Itching  Swelling  Trouble breathing or swallowing      Please call the [facility] at [phone] if you have concerns about your symptoms, your medicines, or your follow-up appointments.

## 2023-05-11 NOTE — TELEPHONE ENCOUNTER
----- Message from Kunal Bower MD sent at 5/11/2023  2:57 PM CDT -----  Hi,    Can we please call Mrs. Colunga in a couple days to assess any additional questions re: AF/PVI and, if she's willing, proceed with scheduling PVI, atrial flutter mapping and ablation, general anesthesia, continue apixaban, hold metoprolol XL 3 days prior.    Thank you!  Tru

## 2023-05-11 NOTE — LETTER
2023    Pamella Abraham MD  [address]  [facility] [address]    RE: Claudia Colunga       Dear Colleague,     I had the pleasure of seeing Claudia Colunga in the [facility].     [facility]  Cardiac Electrophysiology  [address]  [place] [address]   Office: [phone]  Fax: [phone]     Cardiac Electrophysiology Consultation    Patient: Claudia Colunga   : 1955     Referring Provider: Barb Paulino MD  Primary Care Provider: Pamella Abraham MD    CHIEF COMPLAINT/REASON FOR CONSULTATION  Paroxysmal atrial fibrillation and atrial flutter    Assessment/Recommendations   Claudia Colunga is a 67 year old female with paroxysmal atrial fibrillation and atrial flutter, typical atrial flutter with prior CTI ablation (2020), HTN, diet controlled T2DM, hypothyroidism referred by Dr. Paulino for consultation regarding atrial fibrillation and atria flutter.    Paroxysmal atrial fibrillation and atrial flutter - symptomatic with palpitations.  Prior CTI ablation 2020  [id number]  We reviewed atrial fibrillation and atrial flutter physiology and management considerations including managing stroke risk, rate control, cardioversion, antiarrhythmic drug therapy, and catheter ablation. We discussed atrial fibrillation and flutter ablation procedures, anticipated success rates, the potential need for re-do ablation vs addition of anti-arrhythmic drugs, procedural risks (including groin bleeding, tamponade, phrenic or esophageal injury, stroke, pulmonary vein stenosis) and recovery expectations.  She would prefer catheter ablation - she has done research including consideration regarding VoM EtOH injection.  She would prefer to avoid long term antiarrhythmic medications.  - TTE  - PVI, atrial flutter mapping and ablation, general anesthesia, continue apixaban, hold metoprolol XL 3 days prior  - continue metoprolol XL 50mg daily  - continue  "apixaban 5mg twice daily  - we discussed the ongoing importance of lifestyle modification (maintaining a healthy weight, sleep apnea diagnosis and management, alcohol avoidance) as part of a long term strategy for atrial fibrillation management      Follow up: as above         History of Present Illness   Claudia Colunga is a 67 year old female with paroxysmal atrial fibrillation and atrial flutter, typical atrial flutter with prior CTI ablation (8/25/2020), HTN, diet controlled T2DM, hypothyroidism referred by Dr. Paulino for consultation regarding atrial fibrillation and atria flutter.    Mrs. Colunga developed atrial flutter and underwent CTI ablation 8/25/2020.  She has had ER visits 4/17/2023 with atrial flutter and ventricular rate 121bpm treated with DCCV and 5/4/2023 with atrial fibrillation which spontaneously converted to sinus rhythm.    She is anxious regarding recurrent episodes of atrial fibrillation and would like suppress these as definitively as possible.       Physical Examination  Review of Systems   VITALS: /76 (BP Location: Right arm, Patient Position: Sitting, Cuff Size: Adult Large)   Pulse 76   Resp 14   Ht 1.803 m (5' 11\")   Wt 93.9 kg (207 lb)   BMI 28.87 kg/m    Wt Readings from Last 3 Encounters:   05/04/23 96.5 kg (212 lb 11.2 oz)   04/19/23 95 kg (209 lb 6.4 oz)   04/17/23 95.3 kg (210 lb)     CONSTITUTIONAL: well nourished, comfortable, no distress  EYES:  Conjunctivae pink, sclerae clear.    E/N/T:  Oral mucosa pink  RESPIRATORY:  Respiratory effort is normal  CARDIOVASCULAR:  normal S1 and S2  GASTROINTESTINAL:  Abdomen without masses or tenderness  EXTREMITIES:  No clubbing or cyanosis.    MUSCULOSKELETAL:  Overall grossly normal muscle strength  SKIN:  Overall, skin warm and dry, no lesions.  NEURO/PSYCH:  Oriented x 3 with normal affect.   Constitutional:  No weight loss or loss of appetite    Eyes:  No difficulty with vision, no double vision, no dry eyes  ENT:  No " sore throat, difficulty swallowing; changes in hearing or tinnitus  Cardiovascular: As detailed above  Respiratory:  No cough  Musculoskeletal  No joint pain, muscle aches  Neurologic:  No syncope, lightheadedness, fainting spells   Hematologic: No easy bruising, excessive bleeding tendency   Gastrointestinal:  No jaundice, abdominal pain or abdominal bloating  Genitourinary: No changes in urinary habits, no trouble urinating    Psychiatric: No anxiety or depression      Medical History  Surgical History   Past Medical History:   Diagnosis Date    Anxiety     Atrial flutter (H)     Diabetes mellitus, type II (H)     Eosinophilic esophagitis     Essential hypertension     Hypothyroid     Overflow incontinence     Typical atrial flutter (H) 6/23/2020 8/25/2020 right CTI flutter ablation and developed atrial fib post  ablation     Past Surgical History:   Procedure Laterality Date    BIOPSY OF BREAST, INCISIONAL      BIOPSY OF BREAST, INCISIONAL      EP ABLATION AFLUTTER Right 8/25/2020    Procedure: EP Ablation Atrial Flutter;  Surgeon: Vick Adams MD;  Location: [facility];  Service: Cardiology    OVARIAN CYST SURGERY           Family History Social History   Family History   Problem Relation Age of Onset    Dementia Mother 91    Hip fracture Mother 93        went to nursing home after this    Cerebrovascular Disease Mother 96        had to be fed by hand prior to her death    Colon Cancer Father 70    Aortic aneurysm Father         abdominal, repaired    Cerebral aneurysm Father     Peripheral Vascular Disease Father 96        ruptured femoral aneurysm?    Diabetes Type 2  Sister     Breast Cancer Maternal Cousin     Heart Disease No family hx of         Social History     Tobacco Use    Smoking status: Never    Smokeless tobacco: Never   Substance Use Topics    Alcohol use: Yes     Comment: Alcoholic Drinks/day: rare, only with dinner out with friends    Drug use: No         Medications  Allergies      Current Outpatient Medications:     apixaban ANTICOAGULANT (ELIQUIS ANTICOAGULANT) 5 MG tablet, Take 1 tablet (5 mg) by mouth 2 times daily, Disp: 180 tablet, Rfl: 1    Cholecalciferol (VITAMIN D3 PO), Take 1 capsule by mouth daily, Disp: , Rfl:     levothyroxine (SYNTHROID/LEVOTHROID) 75 MCG tablet, TAKE 1 TABLET BY MOUTH ONCE DAILY AT 6AM, Disp: 90 tablet, Rfl: 3    metoprolol succinate ER (TOPROL XL) 50 MG 24 hr tablet, TAKE 1 TABLET BY MOUTH TWICE A DAY, Disp: 180 tablet, Rfl: 2    multivitamin capsule, [MULTIVITAMIN CAPSULE] Take 1 capsule by mouth daily., Disp: , Rfl:      Allergies   Allergen Reactions    Lisinopril Rash    Penicillins Rash          Lab Results    Chemistry CBC Cardiac Enzymes/BNP/TSH/INR   Recent Labs   Lab Test 05/04/23  2255      POTASSIUM 4.5   CHLORIDE 105   CO2 26   *   BUN 15.3   CR 0.81   GFRESTIMATED 79   AMARJIT 9.2     Recent Labs   Lab Test 05/04/23 2255 04/17/23  0133 12/07/22  1131   CR 0.81 0.72 0.70          Recent Labs   Lab Test 05/04/23  2255   WBC 10.1   HGB 13.9   HCT 42.4   MCV 95        Recent Labs   Lab Test 05/04/23 2255 04/17/23 0133 12/07/22  1131   HGB 13.9 14.6 14.2    Recent Labs   Lab Test 05/06/21  0251 06/23/20  0222   TROPONINI <0.01 <0.01     No results for input(s): BNP, NTBNPI, NTBNP in the last 39069 hours.  Recent Labs   Lab Test 04/17/23 0133   TSH 1.57     No results for input(s): INR in the last 03133 hours.      Data Review    ECGs (tracings independently reviewed)  5/4/2023 (post DCCV) - SR 93bpm, RBBB and LAFB QRS 150ms  5/4/2023 - AF, ventricular rate 127bpm, RBBB and LAFB  4/19/2023 - SR 60bpm, RBBB and LAFB  4/17/2023 - AFl, 2:1 AV conduction, ventricular rate 121bpm      11/2020 cardiac monitoring (independently reviewed)  1.  Normal multiday monitor with rare singular noncomplex ectopy.  2.  Although a bundle branch block pattern is present, no high-grade AV block noted.    8/5/2021 TTE  Left ventricular size, wall  motion and function are normal. The ejection  fraction is 60-65%.  There is mild to moderate concentric left ventricular hypertrophy.  The left atrium is borderline dilated.  No significant valve abnormalities       Cc: Barb Paulino MD, Pamella Abraham MD Amila Dilusha William, MD  5/11/2023  2:43 PM          Thank you for allowing me to participate in the care of your patient.      Sincerely,     Kunal Bower MD     [facility]  cc:   Delmis Saez MD  [address] [place] [address]

## 2023-05-12 LAB
ATRIAL RATE - MUSE: 115 BPM
DIASTOLIC BLOOD PRESSURE - MUSE: NORMAL MMHG
INTERPRETATION ECG - MUSE: NORMAL
P AXIS - MUSE: NORMAL DEGREES
PR INTERVAL - MUSE: NORMAL MS
QRS DURATION - MUSE: 82 MS
QT - MUSE: 346 MS
QTC - MUSE: 502 MS
R AXIS - MUSE: -51 DEGREES
SYSTOLIC BLOOD PRESSURE - MUSE: NORMAL MMHG
T AXIS - MUSE: -12 DEGREES
VENTRICULAR RATE- MUSE: 127 BPM

## 2023-05-15 ENCOUNTER — OFFICE VISIT (OUTPATIENT)
Dept: FAMILY MEDICINE | Facility: CLINIC | Age: 68
End: 2023-05-15
Payer: COMMERCIAL

## 2023-05-15 VITALS
BODY MASS INDEX: 29.96 KG/M2 | HEART RATE: 69 BPM | OXYGEN SATURATION: 97 % | WEIGHT: 214 LBS | RESPIRATION RATE: 16 BRPM | SYSTOLIC BLOOD PRESSURE: 106 MMHG | HEIGHT: 71 IN | DIASTOLIC BLOOD PRESSURE: 76 MMHG

## 2023-05-15 DIAGNOSIS — I10 ESSENTIAL HYPERTENSION: Chronic | ICD-10-CM

## 2023-05-15 DIAGNOSIS — I48.0 PAROXYSMAL ATRIAL FIBRILLATION (H): ICD-10-CM

## 2023-05-15 DIAGNOSIS — E11.9 TYPE 2 DIABETES MELLITUS WITHOUT COMPLICATION, WITHOUT LONG-TERM CURRENT USE OF INSULIN (H): Primary | Chronic | ICD-10-CM

## 2023-05-15 DIAGNOSIS — E06.3 HYPOTHYROIDISM DUE TO HASHIMOTO'S THYROIDITIS: Chronic | ICD-10-CM

## 2023-05-15 LAB — HBA1C MFR BLD: 6.6 % (ref 0–5.6)

## 2023-05-15 PROCEDURE — 83036 HEMOGLOBIN GLYCOSYLATED A1C: CPT | Performed by: FAMILY MEDICINE

## 2023-05-15 PROCEDURE — 82570 ASSAY OF URINE CREATININE: CPT | Performed by: FAMILY MEDICINE

## 2023-05-15 PROCEDURE — 36415 COLL VENOUS BLD VENIPUNCTURE: CPT | Performed by: FAMILY MEDICINE

## 2023-05-15 PROCEDURE — 82043 UR ALBUMIN QUANTITATIVE: CPT | Performed by: FAMILY MEDICINE

## 2023-05-15 PROCEDURE — 84443 ASSAY THYROID STIM HORMONE: CPT | Performed by: FAMILY MEDICINE

## 2023-05-15 PROCEDURE — 99214 OFFICE O/P EST MOD 30 MIN: CPT | Performed by: FAMILY MEDICINE

## 2023-05-15 RX ORDER — ATORVASTATIN CALCIUM 20 MG/1
20 TABLET, FILM COATED ORAL DAILY
Qty: 90 TABLET | Refills: 3 | Status: SHIPPED | OUTPATIENT
Start: 2023-05-15 | End: 2023-05-31

## 2023-05-15 NOTE — PROGRESS NOTES
Assessment/ Plan     1. Type 2 diabetes mellitus without complication, without long-term current use of insulin (H)  She controls her diabetes with diet and exercise.  Her A1c is 6.6 today.  She recently lost some weight and is working hard on this.  She is up-to-date on eye exam.  I discussed starting a statin today and she was willing to give it a try.  - atorvastatin (LIPITOR) 20 MG tablet; Take 1 tablet (20 mg) by mouth daily  Dispense: 90 tablet; Refill: 3  - Albumin Random Urine Quantitative with Creat Ratio; Future  - Hemoglobin A1c; Future    2. Essential hypertension  Under good control with her current medication.    3. Hypothyroidism due to Hashimoto's thyroiditis  Is been fairly stable on her current dose of Synthroid.  She is due for TSH today.  - TSH with free T4 reflex; Future    4. Paroxysmal atrial fibrillation (H)  Been struggling with some episodes of atrial fibrillation/flutter.  She is following with cardiology and may proceed with another ablation.    She does have her colonoscopy scheduled for August.  If she still struggling with A-fib, we could just do a Cologuard this year.      Subjective:      Claudia Colunga is a 67 year old female who presents for follow-up of type 2 diabetes and med check.  She is due for an A1c and a microalbumin today.  She has been able to manage her diabetes with diet and exercise.  She is been working hard and actually lost about 7 pounds.  She has been struggling with some paroxysmal atrial fibrillation and just had another episode.  She is following up with cardiology and they might do another ablation.  Her blood pressures under excellent control.  She has been resistant to a statin in the past and we talked about this today.  Discussed how with a diagnosis of type 2 diabetes your risk for heart disease and stroke is 2-3 times the normal population.  She states she is willing to give it a try.  We reviewed potential adverse side effects.  She is due for  "colonoscopy and she has a scheduled for August.  If she has to push this off due to the A-fib issue, we could do a Cologuard this time around.  She will update me when the time comes if she wants me to order this.    Relevant past medical, family, surgical, and social history reviewed with patient, unless noted in HPI, not pertinent for this visit.  Medications were discussed and reconciled.   Review of Systems   A 12 point comprehensive review of systems was negative except as noted.      Current Outpatient Medications   Medication Sig Dispense Refill     apixaban ANTICOAGULANT (ELIQUIS ANTICOAGULANT) 5 MG tablet Take 1 tablet (5 mg) by mouth 2 times daily 180 tablet 1     atorvastatin (LIPITOR) 20 MG tablet Take 1 tablet (20 mg) by mouth daily 90 tablet 3     levothyroxine (SYNTHROID/LEVOTHROID) 75 MCG tablet TAKE 1 TABLET BY MOUTH ONCE DAILY AT 6AM 90 tablet 3     metoprolol succinate ER (TOPROL XL) 50 MG 24 hr tablet TAKE 1 TABLET BY MOUTH TWICE A  tablet 2     multivitamin capsule [MULTIVITAMIN CAPSULE] Take 1 capsule by mouth daily.       Cholecalciferol (VITAMIN D3 PO) Take 1 capsule by mouth daily (Patient not taking: Reported on 5/15/2023)           Objective:     /76   Pulse 69   Resp 16   Ht 1.803 m (5' 11\")   Wt 97.1 kg (214 lb)   SpO2 97%   BMI 29.85 kg/m      Body mass index is 29.85 kg/m .       General appearance: alert, appears stated age and cooperative  Lungs: clear to auscultation bilaterally  Heart: regular rate and rhythm, S1, S2 normal, no murmur, click, rub or gallop     Diabetic foot exam: normal DP and PT pulses, no trophic changes or ulcerative lesions and normal sensory exam    No results found for this or any previous visit (from the past 168 hour(s)).       This note has been dictated using voice recognition software. Any grammatical or context distortions are unintentional and inherent to the software  "

## 2023-05-16 LAB — TSH SERPL DL<=0.005 MIU/L-ACNC: 1.44 UIU/ML (ref 0.3–4.2)

## 2023-05-17 LAB
CREAT UR-MCNC: 171 MG/DL
MICROALBUMIN UR-MCNC: <12 MG/L
MICROALBUMIN/CREAT UR: NORMAL MG/G{CREAT}

## 2023-05-18 ENCOUNTER — HOSPITAL ENCOUNTER (OUTPATIENT)
Dept: CARDIOLOGY | Facility: CLINIC | Age: 68
Discharge: HOME | End: 2023-05-18
Attending: INTERNAL MEDICINE | Admitting: INTERNAL MEDICINE
Payer: COMMERCIAL

## 2023-05-18 DIAGNOSIS — I48.0 PAROXYSMAL ATRIAL FIBRILLATION (H): ICD-10-CM

## 2023-05-18 LAB — LVEF ECHO: NORMAL

## 2023-05-18 PROCEDURE — 93306 TTE W/DOPPLER COMPLETE: CPT | Mod: 26 | Performed by: INTERNAL MEDICINE

## 2023-05-18 PROCEDURE — 255N000002 HC RX 255 OP 636: Performed by: INTERNAL MEDICINE

## 2023-05-18 RX ADMIN — PERFLUTREN 2 ML: 6.52 INJECTION, SUSPENSION INTRAVENOUS at 10:24

## 2023-05-18 NOTE — TELEPHONE ENCOUNTER
1st Attempt to contact pt, voicemail message was left with contact information and instructing pt to call back.  5/18/2023 10:22 AM  Olinda Quijano RN

## 2023-05-24 NOTE — TELEPHONE ENCOUNTER
2nd attempt to reach patient, no answer and message left for return call.  Mallory Miller RN  5/24/2023 1:33 PM

## 2023-05-24 NOTE — TELEPHONE ENCOUNTER
Return call from patient.  She states she is still unsure, she has an apt with Dr. Paulino on 5-31-23 and would like to discuss further at that time.  Reviewed contact information for any additional questions or concerns.  She states understanding

## 2023-05-31 ENCOUNTER — OFFICE VISIT (OUTPATIENT)
Dept: CARDIOLOGY | Facility: CLINIC | Age: 68
End: 2023-05-31
Payer: COMMERCIAL

## 2023-05-31 VITALS
HEART RATE: 73 BPM | DIASTOLIC BLOOD PRESSURE: 74 MMHG | RESPIRATION RATE: 16 BRPM | BODY MASS INDEX: 28.8 KG/M2 | HEIGHT: 71 IN | WEIGHT: 205.7 LBS | OXYGEN SATURATION: 96 % | SYSTOLIC BLOOD PRESSURE: 112 MMHG

## 2023-05-31 DIAGNOSIS — I48.0 PAF (PAROXYSMAL ATRIAL FIBRILLATION) (H): Primary | ICD-10-CM

## 2023-05-31 DIAGNOSIS — E11.9 TYPE 2 DIABETES MELLITUS WITHOUT COMPLICATION, WITHOUT LONG-TERM CURRENT USE OF INSULIN (H): Chronic | ICD-10-CM

## 2023-05-31 PROCEDURE — 99214 OFFICE O/P EST MOD 30 MIN: CPT | Performed by: INTERNAL MEDICINE

## 2023-05-31 RX ORDER — ATORVASTATIN CALCIUM 10 MG/1
10 TABLET, FILM COATED ORAL DAILY
Qty: 90 TABLET | Refills: 3
Start: 2023-05-31 | End: 2023-10-04

## 2023-06-01 NOTE — PROGRESS NOTES
"  HEART CARE ENCOUNTER CONSULTATON NOTE      [facility]  [phone]      Assessment/Recommendations   Assessment:  1.  Atrial flutter/fibrillation: Atrial flutter status post ablation 8/25/2020 now with recurrent atrial fibrillation and flutter.  Scheduled for PVI and repeat aflutter ablation.  She is hesitant to start antiarrhythmic therapy.  2.  Hypertension: Well-controlled  3.  Diabetes mellitus type 2  4.  Dyslipidemia: Would like to more aggressively manage lipids with her diabetes    Plan:  1.  Continue Eliquis 5 mg twice daily  2.  Continue Toprol XL  3.  Patient plans on proceeding with PVI with aflutter mapping and ablation  4.  May start low-dose Lipitor 10 mg daily for improved cholesterol management     May follow-up in 1 year       History of Present Illness/Subjective    HPI: Claudia Colunga is a 67 year old femalevwith history of hypertension, diabetes mellitus type 2, typical atrial flutter status post ablation done on 8/25/2020 with atrial fibrillation status post cardioversion during procedure who is here for a follow-up.  I saw her a year ago she has had presentation to the ED in April with atrial flutter requiring cardioversion and subsequently a couple weeks later again went to the ED with atrial fibrillation and spontaneously converted.  She was seen in EP clinic by Dr. Bower and is being scheduled for PVI/repeat a flutter ablation.  She has tried different lifestyle modifications.  She does not drink alcohol, cut out caffeine.  She is trying to follow healthy diet and exercise regularly.  She feels that her episodes are stress related.       Physical Examination  Review of Systems   Vitals: /74 (BP Location: Left arm, Patient Position: Sitting, Cuff Size: Adult Large)   Pulse 73   Resp 16   Ht 1.803 m (5' 11\")   Wt 93.3 kg (205 lb 11.2 oz)   SpO2 96%   BMI 28.69 kg/m    BMI= Body mass index is 28.69 kg/m .  Wt Readings from Last 3 Encounters:   05/31/23 " 93.3 kg (205 lb 11.2 oz)   05/15/23 97.1 kg (214 lb)   05/11/23 93.9 kg (207 lb)       General Appearance:   no distress, normal body habitus   ENT/Mouth: membranes moist, no oral lesions or bleeding gums.      EYES:  no scleral icterus, normal conjunctivae   Neck: no carotid bruits or thyromegaly   Chest/Lungs:   lungs are clear to auscultation   Cardiovascular:   Regular. Normal first and second heart sounds with no murmur no edema bilaterally        Extremities: no cyanosis or clubbing   Skin: no xanthelasma, warm.    Neurologic: normal  bilateral, no tremors     Psychiatric: alert and oriented x3, calm        Please refer above for cardiac ROS details.        Medical History  Surgical History Family History Social History   Past Medical History:   Diagnosis Date     Anxiety      Atrial flutter (H)      Diabetes mellitus, type II (H)      Eosinophilic esophagitis      Essential hypertension      Hypothyroid      Overflow incontinence      Typical atrial flutter (H) 6/23/2020 8/25/2020 right CTI flutter ablation and developed atrial fib post  ablation      Past Surgical History:   Procedure Laterality Date     BIOPSY OF BREAST, INCISIONAL       BIOPSY OF BREAST, INCISIONAL       EP ABLATION AFLUTTER Right 8/25/2020    Procedure: EP Ablation Atrial Flutter;  Surgeon: Vick Adams MD;  Location: [facility];  Service: Cardiology     OVARIAN CYST SURGERY       Family History   Problem Relation Age of Onset     Dementia Mother 91     Hip fracture Mother 93        went to nursing home after this     Cerebrovascular Disease Mother 96        had to be fed by hand prior to her death     Colon Cancer Father 70     Aortic aneurysm Father         abdominal, repaired     Cerebral aneurysm Father      Peripheral Vascular Disease Father 96        ruptured femoral aneurysm?     Diabetes Type 2  Sister      Breast Cancer Maternal Cousin      Heart Disease No family hx of         Social History      Socioeconomic History     Marital status: Single     Spouse name: Not on file     Number of children: 0     Years of education: Not on file     Highest education level: Not on file   Occupational History     Not on file   Tobacco Use     Smoking status: Never     Smokeless tobacco: Never   Vaping Use     Vaping status: Not on file   Substance and Sexual Activity     Alcohol use: Yes     Comment: Alcoholic Drinks/day: rare, only with dinner out with friends     Drug use: No     Sexual activity: Not on file   Other Topics Concern     Not on file   Social History Narrative    She walks her poodle daily for 30-60 minutes. She enjoys going to the [employer] to paddle in the pool.     Social Determinants of Health     Financial Resource Strain: Not on file   Food Insecurity: Not on file   Transportation Needs: Not on file   Physical Activity: Not on file   Stress: Not on file   Social Connections: Not on file   Intimate Partner Violence: Not on file   Housing Stability: Not on file           Medications  Allergies   Current Outpatient Medications   Medication Sig Dispense Refill     apixaban ANTICOAGULANT (ELIQUIS ANTICOAGULANT) 5 MG tablet Take 1 tablet (5 mg) by mouth 2 times daily 180 tablet 1     atorvastatin (LIPITOR) 10 MG tablet Take 1 tablet (10 mg) by mouth daily 90 tablet 3     levothyroxine (SYNTHROID/LEVOTHROID) 75 MCG tablet TAKE 1 TABLET BY MOUTH ONCE DAILY AT 6AM 90 tablet 3     metoprolol succinate ER (TOPROL XL) 50 MG 24 hr tablet TAKE 1 TABLET BY MOUTH TWICE A  tablet 2     multivitamin capsule [MULTIVITAMIN CAPSULE] Take 1 capsule by mouth daily.       Cholecalciferol (VITAMIN D3 PO) Take 1 capsule by mouth daily (Patient not taking: Reported on 5/15/2023)         Allergies   Allergen Reactions     Lisinopril Rash     Penicillins Rash          Lab Results    Chemistry/lipid CBC Cardiac Enzymes/BNP/TSH/INR   Recent Labs   Lab Test 12/07/22  1131   CHOL 180   HDL 43*   LDL  109*   TRIG 142     Recent Labs   Lab Test 12/07/22  1131 07/07/21  0954 10/09/19  0727   * 98 97     Recent Labs   Lab Test 05/04/23  2255      POTASSIUM 4.5   CHLORIDE 105   CO2 26   *   BUN 15.3   CR 0.81   GFRESTIMATED 79   AMARJIT 9.2     Recent Labs   Lab Test 05/04/23  2255 04/17/23  0133 12/07/22  1131   CR 0.81 0.72 0.70     Recent Labs   Lab Test 05/15/23  1441 12/07/22  1131 03/23/22  1042   A1C 6.6* 6.7* 6.2*          Recent Labs   Lab Test 05/04/23  2255   WBC 10.1   HGB 13.9   HCT 42.4   MCV 95        Recent Labs   Lab Test 05/04/23  2255 04/17/23  0133 12/07/22  1131   HGB 13.9 14.6 14.2    Recent Labs   Lab Test 05/06/21  0251 06/23/20  0222   TROPONINI <0.01 <0.01     No results for input(s): BNP, NTBNPI, NTBNP in the last 54335 hours.  Recent Labs   Lab Test 05/15/23  1441   TSH 1.44     No results for input(s): INR in the last 94103 hours.     Barb Paulino MD

## 2023-06-29 ENCOUNTER — TELEPHONE (OUTPATIENT)
Dept: CARDIOLOGY | Facility: CLINIC | Age: 68
End: 2023-06-29
Payer: COMMERCIAL

## 2023-06-29 NOTE — TELEPHONE ENCOUNTER
[facility] Call Center    Phone Message    May a detailed message be left on voicemail: yes     Reason for Call: Other: parker from [facility] is calling to get holding/ bridging orders which include, 2 day hold  On apixaban ANTICOAGULANT (ELIQUIS ANTICOAGULANT) 5 MG tablet prior to a colonoscopy schedule on 8/1/2023, and if unable to do a hold requesting a cretainine or a clearance within 90 days, thank you  Fax:[phone]    Action Taken: Other: cardiology    Travel Screening: Not Applicable   Thank you!  [facility]

## 2023-06-29 NOTE — TELEPHONE ENCOUNTER
Left detailed msg for [facility] clinical support team with Dr. Paulino's response / recommendations.  mg

## 2023-06-29 NOTE — TELEPHONE ENCOUNTER
Please address [facility] request for Eliquis hold orders - yrly follow-up pending 6/2023.  mg

## 2023-07-06 DIAGNOSIS — I48.3 TYPICAL ATRIAL FLUTTER (H): ICD-10-CM

## 2023-07-06 RX ORDER — APIXABAN 5 MG/1
TABLET, FILM COATED ORAL
Qty: 180 TABLET | Refills: 1 | Status: SHIPPED | OUTPATIENT
Start: 2023-07-06 | End: 2024-02-26

## 2023-07-06 NOTE — TELEPHONE ENCOUNTER
Routing refill request to provider for review/approval:   Early request    Last Written Prescription Date:  3/10/23  Last Fill Quantity: 180,  # refills: 1   Last office visit provider:  5/15/23     Requested Prescriptions   Pending Prescriptions Disp Refills     ELIQUIS ANTICOAGULANT 5 MG tablet [Pharmacy Med Name: ELIQUIS 5 MG TABLET] 180 tablet 1     Sig: TAKE 1 TABLET BY MOUTH TWICE A DAY       Direct Oral Anticoagulant Agents Passed - 7/6/2023  1:57 PM        Passed - Normal Platelets on file in past 12 months     Recent Labs   Lab Test 05/04/23  2255                  Passed - Medication is active on med list        Passed - Patient is 18-79 years of age        Passed - Serum creatinine less than or equal to 1.4 on file in past 12 mos     Recent Labs   Lab Test 05/04/23  2255   CR 0.81       Ok to refill medication if creatinine is low          Passed - Weight is greater than 60 kg for the past year     Wt Readings from Last 3 Encounters:   05/31/23 93.3 kg (205 lb 11.2 oz)   05/15/23 97.1 kg (214 lb)   05/11/23 93.9 kg (207 lb)             Passed - No active pregnancy on record        Passed - No positive pregnancy test within past 12 months        Passed - Recent (6 mo) or future (30 days) visit within the authorizing provider's specialty             Mayelin Dumont RN 07/06/23 2:02 PM

## 2023-07-18 DIAGNOSIS — I48.3 TYPICAL ATRIAL FLUTTER (H): ICD-10-CM

## 2023-07-18 RX ORDER — METOPROLOL SUCCINATE 50 MG/1
50 TABLET, EXTENDED RELEASE ORAL 2 TIMES DAILY
Qty: 180 TABLET | Refills: 3 | Status: SHIPPED | OUTPATIENT
Start: 2023-07-18 | End: 2024-07-02

## 2023-07-18 NOTE — TELEPHONE ENCOUNTER
Called and informed patient that script for metoprolol was renewed.    Gianluca Block RN     [facility]

## 2023-07-18 NOTE — TELEPHONE ENCOUNTER
"Last Written Prescription Date:  7/27/22  Last Fill Quantity: 180,  # refills: 2   Last office visit provider:  5/15/23     Requested Prescriptions   Pending Prescriptions Disp Refills     metoprolol succinate ER (TOPROL XL) 50 MG 24 hr tablet 180 tablet 2     Sig: Take 1 tablet (50 mg) by mouth 2 times daily       Beta-Blockers Protocol Passed - 7/18/2023  9:25 AM        Passed - Blood pressure under 140/90 in past 12 months     BP Readings from Last 3 Encounters:   05/31/23 112/74   05/15/23 106/76   05/11/23 124/76                 Passed - Patient is age 6 or older        Passed - Recent (12 mo) or future (30 days) visit within the authorizing provider's specialty     Patient has had an office visit with the authorizing provider or a provider within the authorizing providers department within the previous 12 mos or has a future within next 30 days. See \"Patient Info\" tab in inbasket, or \"Choose Columns\" in Meds & Orders section of the refill encounter.              Passed - Medication is active on med list             Gianluca Block RN 07/18/23 10:38 AM  "

## 2023-08-14 ENCOUNTER — NURSE TRIAGE (OUTPATIENT)
Dept: NURSING | Facility: CLINIC | Age: 68
End: 2023-08-14
Payer: COMMERCIAL

## 2023-08-14 NOTE — TELEPHONE ENCOUNTER
Nurse Triage SBAR    Is this a 2nd Level Triage? NO    Situation: Atrial flutter    Background: Patient calling, states that she thinks she is in atrial flutter. States that she can feel her heart fluttering in her chest.  She also has a history of a-fib.  She states that her blood pressure was 150/97 and her pulse is 77. She reports some chest heaviness and lightheadedness.     Assessment: possible atrial fibrillation    Protocol Recommended Disposition:   Go to ED Now    Recommendation: Patient states she has someone to take her to the ED and they can go now.      N/A    SUMI GARCIA RN    Does the patient meet one of the following criteria for ADS visit consideration? 15+ years old, with an [facility] PCP     TIP  Providers, please consider if this condition is appropriate for management at one of our Acute and Diagnostic Services sites.     If patient is a good candidate, please use dotphrase <dot>triageresponse and select Refer to ADS to document.    Reason for Disposition   Dizziness, lightheadedness, or weakness    Additional Information   Negative: Passed out (i.e., lost consciousness, collapsed and was not responding)   Negative: Shock suspected (e.g., cold/pale/clammy skin, too weak to stand, low BP, rapid pulse)   Negative: Difficult to awaken or acting confused (e.g., disoriented, slurred speech)   Negative: Visible sweat on face or sweat dripping down face   Negative: Unable to walk, or can only walk with assistance (e.g., requires support)   Negative: Received SHOCK from implantable cardiac defibrillator and has persisting symptoms (i.e., palpitations, lightheadedness)   Negative: Dizziness, lightheadedness, or weakness and heart beating very rapidly (e.g., > 140 / minute)   Negative: Dizziness, lightheadedness, or weakness and heart beating very slowly (e.g., < 50 / minute)   Negative: Sounds like a life-threatening emergency to the triager   Negative: Chest pain   Negative: Difficulty  breathing    Protocols used: Heart Rate and Heartbeat Dbreayaco-H-EY

## 2023-08-27 ENCOUNTER — HEALTH MAINTENANCE LETTER (OUTPATIENT)
Age: 68
End: 2023-08-27

## 2023-09-06 ENCOUNTER — NURSE TRIAGE (OUTPATIENT)
Dept: NURSING | Facility: CLINIC | Age: 68
End: 2023-09-06

## 2023-09-06 ENCOUNTER — OFFICE VISIT (OUTPATIENT)
Dept: FAMILY MEDICINE | Facility: CLINIC | Age: 68
End: 2023-09-06
Payer: COMMERCIAL

## 2023-09-06 VITALS
TEMPERATURE: 99.1 F | DIASTOLIC BLOOD PRESSURE: 85 MMHG | HEIGHT: 71 IN | RESPIRATION RATE: 16 BRPM | WEIGHT: 209.38 LBS | HEART RATE: 81 BPM | SYSTOLIC BLOOD PRESSURE: 118 MMHG | BODY MASS INDEX: 29.31 KG/M2 | OXYGEN SATURATION: 95 %

## 2023-09-06 DIAGNOSIS — N30.01 ACUTE CYSTITIS WITH HEMATURIA: Primary | ICD-10-CM

## 2023-09-06 LAB
ALBUMIN UR-MCNC: NEGATIVE MG/DL
APPEARANCE UR: CLEAR
BACTERIA #/AREA URNS HPF: ABNORMAL /HPF
BILIRUB UR QL STRIP: NEGATIVE
COLOR UR AUTO: YELLOW
GLUCOSE UR STRIP-MCNC: NEGATIVE MG/DL
HGB UR QL STRIP: ABNORMAL
KETONES UR STRIP-MCNC: NEGATIVE MG/DL
LEUKOCYTE ESTERASE UR QL STRIP: ABNORMAL
NITRATE UR QL: NEGATIVE
PH UR STRIP: 6.5 [PH] (ref 5–8)
RBC #/AREA URNS AUTO: ABNORMAL /HPF
RENAL EPI CELLS #/AREA URNS HPF: ABNORMAL /HPF
SP GR UR STRIP: 1.01 (ref 1–1.03)
SQUAMOUS #/AREA URNS AUTO: ABNORMAL /LPF
UROBILINOGEN UR STRIP-ACNC: 0.2 E.U./DL
WBC #/AREA URNS AUTO: ABNORMAL /HPF

## 2023-09-06 PROCEDURE — 87088 URINE BACTERIA CULTURE: CPT | Performed by: FAMILY MEDICINE

## 2023-09-06 PROCEDURE — 99213 OFFICE O/P EST LOW 20 MIN: CPT | Performed by: FAMILY MEDICINE

## 2023-09-06 PROCEDURE — 87086 URINE CULTURE/COLONY COUNT: CPT | Performed by: FAMILY MEDICINE

## 2023-09-06 PROCEDURE — 87186 SC STD MICRODIL/AGAR DIL: CPT | Performed by: FAMILY MEDICINE

## 2023-09-06 PROCEDURE — 81001 URINALYSIS AUTO W/SCOPE: CPT | Performed by: FAMILY MEDICINE

## 2023-09-06 RX ORDER — NITROFURANTOIN 25; 75 MG/1; MG/1
100 CAPSULE ORAL 2 TIMES DAILY
Qty: 10 CAPSULE | Refills: 0 | Status: SHIPPED | OUTPATIENT
Start: 2023-09-06 | End: 2023-09-11

## 2023-09-06 ASSESSMENT — ENCOUNTER SYMPTOMS: HEMATURIA: 1

## 2023-09-06 NOTE — PROGRESS NOTES
1. Acute cystitis with hematuria  Claudia comes in today with symptoms of urinary frequency, dysuria, and gross hematuria.  Her urinalysis is grossly positive for infection.  Get her started on Macrobid twice daily for 5 days.  Follow-up if hematuria continues.  She is not 100% sure that it is coming from the urethra and not the vaginal area.  She is seeing her OB/GYN at the end of the month and will discuss that if the bleeding continues and it appears to be more vaginal in origin.  She will then see me in October for follow-up of type 2 diabetes.  - UA Macroscopic with reflex to Microscopic and Culture - Lab Collect; Future  - UA Macroscopic with reflex to Microscopic and Culture - Lab Collect  - Urine Microscopic Exam  - Urine Culture  - nitroFURantoin macrocrystal-monohydrate (MACROBID) 100 MG capsule; Take 1 capsule (100 mg) by mouth 2 times daily for 5 days  Dispense: 10 capsule; Refill: 0      Subjective   Claudia is a 67 year old, presenting for the following health issues:  Urinary Frequency and Hematuria      9/6/2023    10:46 AM   Additional Questions   Roomed by Karen AUGUSTE CMA   Accompanied by Self       Hematuria    History of Present Illness       Reason for visit:  Vaginal bleeding  Symptom onset:  Today  Symptoms include:  Bleeding  Symptom intensity:  Moderate  Symptom progression:  Worsening  Had these symptoms before:  No  What makes it worse:  Urinating  What makes it better:  No    She eats 4 or more servings of fruits and vegetables daily.She consumes 2 sweetened beverage(s) daily.She exercises with enough effort to increase her heart rate 30 to 60 minutes per day.  She exercises with enough effort to increase her heart rate 7 days per week.   She is taking medications regularly.       Claudia started having symptoms yesterday.  She had a little bit of urinary frequency and then she noticed her urine was just a little bit pink.  Then when she woke up this morning she had a little tiny blood clot in  "the toilet and a little spot on her pad.  She does not think it is vaginal but she is not 100% sure.  She has not had a bladder infection in many years.  She not having flank pain, fever, or nausea.  She has been doing a lot of swimming at the pool this year.  She is good to be seeing her OB/GYN at the end of the month.  Review of Systems   Genitourinary:  Positive for hematuria.            Objective    BP (!) 169/110 (BP Location: Left arm, Patient Position: Sitting, Cuff Size: Adult Regular)   Pulse 63   Temp 99.1  F (37.3  C) (Oral)   Resp 16   Ht 1.803 m (5' 11\")   Wt 95 kg (209 lb 6 oz)   SpO2 95%   BMI 29.20 kg/m    Body mass index is 29.2 kg/m .  Physical Exam   GENERAL: healthy, alert and no distress  RESP: lungs clear to auscultation - no rales, rhonchi or wheezes  CV: regular rate and rhythm, normal S1 S2, no S3 or S4, no murmur, click or rub, no peripheral edema and peripheral pulses strong  ABDOMEN: soft, nontender, no hepatosplenomegaly, no masses and bowel sounds normal  No CVA tenderness.  Results for orders placed or performed in visit on 09/06/23   UA Macroscopic with reflex to Microscopic and Culture - Lab Collect     Status: Abnormal    Specimen: Urine, Clean Catch   Result Value Ref Range    Color Urine Yellow Colorless, Straw, Light Yellow, Yellow    Appearance Urine Clear Clear    Glucose Urine Negative Negative mg/dL    Bilirubin Urine Negative Negative    Ketones Urine Negative Negative mg/dL    Specific Gravity Urine 1.010 1.005 - 1.030    Blood Urine Large (A) Negative    pH Urine 6.5 5.0 - 8.0    Protein Albumin Urine Negative Negative mg/dL    Urobilinogen Urine 0.2 0.2, 1.0 E.U./dL    Nitrite Urine Negative Negative    Leukocyte Esterase Urine Moderate (A) Negative   Urine Microscopic Exam     Status: Abnormal   Result Value Ref Range    Bacteria Urine Few (A) None Seen /HPF    RBC Urine 0-2 0-2 /HPF /HPF    WBC Urine 5-10 (A) 0-5 /HPF /HPF    Squamous Epithelials Urine Few (A) " None Seen /LPF    Renal Tubular Epithelials Urine Few (A) None Seen /HPF

## 2023-09-06 NOTE — TELEPHONE ENCOUNTER
"Nurse Triage SBAR    Is this a 2nd Level Triage? YES, LICENSED PRACTITIONER REVIEW IS REQUIRED    Situation: Hematuria    Background: Patient states that she had \"pinkish colored urine\" yesterday.  This morning she passed a small blood clot with her urine and states the water was pink.  She also wears a depends and she states that also had a few spots of pink blood.  She states it burns when she urinates and she is also having pain at her lower right back.  She denies any recent injury, denies fever.     She does take Eliquis.     Assessment: Hematuria, UTI    Protocol Recommended Disposition:   Go To Office Now    Recommendation: Please contact this patient with any further recommendations.      Routed to provider    SUMI GARCIA RN    Does the patient meet one of the following criteria for ADS visit consideration? 16+ years old, with an [facility] PCP     TIP  Providers, please consider if this condition is appropriate for management at one of our Acute and Diagnostic Services sites.     If patient is a good candidate, please use dotphrase <dot>triageresponse and select Refer to ADS to document.    Reason for Disposition   Taking Coumadin (warfarin) or other strong blood thinner, or known bleeding disorder (e.g., thrombocytopenia)    Additional Information   Negative: Shock suspected (e.g., cold/pale/clammy skin, too weak to stand, low BP, rapid pulse)   Negative: Sounds like a life-threatening emergency to the triager   Negative: Urinary catheter, questions about   Negative: Recent back or abdominal injury   Negative: Recent genital injury   Negative: Unable to urinate (or only a few drops) > 4 hours and bladder feels very full (e.g., palpable bladder or strong urge to urinate)   Negative: Diffuse (all over) muscle pains in the shoulders, arms, legs, and back and dark (cola or tea-colored) or red-colored urine   Negative: Passing pure blood or large blood clots (i.e., larger than a dime or grape)   Negative: " Fever > 100.4 F (38.0 C)   Negative: Patient sounds very sick or weak to the triager   Negative: Known sickle cell disease    Protocols used: Urine - Blood In-A-OH

## 2023-09-07 LAB — BACTERIA UR CULT: ABNORMAL

## 2023-09-11 ENCOUNTER — TELEPHONE (OUTPATIENT)
Dept: FAMILY MEDICINE | Facility: CLINIC | Age: 68
End: 2023-09-11
Payer: COMMERCIAL

## 2023-09-11 NOTE — TELEPHONE ENCOUNTER
Attempted to call patient, left message for return call to clinic. Please transfer to a nurse for further discussion about recent lab results when she returns call.    Sushila Sutton RN

## 2023-09-11 NOTE — TELEPHONE ENCOUNTER
Patient called back to talk to an RN. I transferred her to Sushila (RN) for assistance to talk about labs.

## 2023-09-11 NOTE — TELEPHONE ENCOUNTER
Patient returning call to discuss lab results. She is currently being treated for a UTI. Questioned the leukocyte esterase and blood seen in macroscopic UA, discussed that these are normal findings with UTI. Patient has been taking the antibiotics since her OV as has noted improvement. Patient had no further questions or concerns at the end of our call.     Sushila Sutton RN

## 2023-09-11 NOTE — TELEPHONE ENCOUNTER
Incoming call from patient requesting clarification on some of her lab results. States she seen the results but wants to talk to a nurse or provider. Offered to take her questions but declined and wants a call back. Please advise

## 2023-09-15 ENCOUNTER — TELEPHONE (OUTPATIENT)
Dept: CARDIOLOGY | Facility: CLINIC | Age: 68
End: 2023-09-15
Payer: COMMERCIAL

## 2023-09-15 NOTE — TELEPHONE ENCOUNTER
[facility] Call Center    Phone Message    May a detailed message be left on voicemail: yes     Reason for Call: Medication Question or concern regarding medication   Prescription Clarification  Name of Medication: Eliquis  Prescribing Provider: Jefferson   Pharmacy: [id number] - [facility], [address]     What on the order needs clarification? Hold and bridge order requested for Eliquis. Patient is having colonoscopy on Oct 12. [facility] wants to hold eliquis for 2 days prior. If patient unable to do 2 day hold, [facility] is requesting a creatine hold.       Action Taken: Other: cardio    Travel Screening: Not Applicable

## 2023-10-04 ENCOUNTER — OFFICE VISIT (OUTPATIENT)
Dept: FAMILY MEDICINE | Facility: CLINIC | Age: 68
End: 2023-10-04
Payer: COMMERCIAL

## 2023-10-04 VITALS
DIASTOLIC BLOOD PRESSURE: 53 MMHG | SYSTOLIC BLOOD PRESSURE: 101 MMHG | WEIGHT: 206.6 LBS | HEART RATE: 47 BPM | BODY MASS INDEX: 28.92 KG/M2 | RESPIRATION RATE: 16 BRPM | TEMPERATURE: 98.1 F | OXYGEN SATURATION: 95 % | HEIGHT: 71 IN

## 2023-10-04 DIAGNOSIS — E11.9 TYPE 2 DIABETES MELLITUS WITHOUT COMPLICATION, WITHOUT LONG-TERM CURRENT USE OF INSULIN (H): Primary | Chronic | ICD-10-CM

## 2023-10-04 DIAGNOSIS — E55.9 VITAMIN D DEFICIENCY: ICD-10-CM

## 2023-10-04 DIAGNOSIS — E78.00 HYPERCHOLESTEREMIA: ICD-10-CM

## 2023-10-04 DIAGNOSIS — R05.3 CHRONIC COUGH: ICD-10-CM

## 2023-10-04 DIAGNOSIS — H61.23 BILATERAL IMPACTED CERUMEN: ICD-10-CM

## 2023-10-04 LAB
CHOLEST SERPL-MCNC: 190 MG/DL
HBA1C MFR BLD: 6.6 % (ref 0–5.6)
HDLC SERPL-MCNC: 39 MG/DL
LDLC SERPL CALC-MCNC: 119 MG/DL
NONHDLC SERPL-MCNC: 151 MG/DL
TRIGL SERPL-MCNC: 160 MG/DL
VIT D+METAB SERPL-MCNC: 32 NG/ML (ref 20–50)

## 2023-10-04 PROCEDURE — 99214 OFFICE O/P EST MOD 30 MIN: CPT | Performed by: FAMILY MEDICINE

## 2023-10-04 PROCEDURE — 80061 LIPID PANEL: CPT | Performed by: FAMILY MEDICINE

## 2023-10-04 PROCEDURE — 36415 COLL VENOUS BLD VENIPUNCTURE: CPT | Performed by: FAMILY MEDICINE

## 2023-10-04 PROCEDURE — 83036 HEMOGLOBIN GLYCOSYLATED A1C: CPT | Performed by: FAMILY MEDICINE

## 2023-10-04 PROCEDURE — 82306 VITAMIN D 25 HYDROXY: CPT | Performed by: FAMILY MEDICINE

## 2023-10-04 NOTE — PROGRESS NOTES
Assessment/ Plan   1. Type 2 diabetes mellitus without complication, without long-term current use of insulin (H)  Claudia has been able to control her diabetes with diet.  Her A1c is 6.6 today and holding steady.  She stopped taking her statin but will reevaluate this.  She is up-to-date on eye exam and foot exam.  Follow-up in 6 months.  - Hemoglobin A1c; Future    2. Hypercholesteremia  She had started on a statin after her last blood work, but stopped taking it as she was having some neck pain.  She states she did not really think it made much of a difference when she stopped taking it and that is more of a chronic issue.  She really does not want to take a lot of medications.  We will recheck today and make recommendations based on her levels.  - Lipid panel reflex to direct LDL Fasting; Future    3. Vitamin D deficiency  She is taking a supplement.  She has a history of vitamin D deficiency and would like to recheck today.  - Vitamin D Deficiency; Future    4. Chronic cough  This is likely related to reflux.  She has somewhat of a chronic cough and hoarse voice.  She is currently not taking any things we will have her start with Pepcid first.  If its not improving symptoms, then would start a PPI.    5. Bilateral impacted cerumen  She has bilateral cerumen impaction that was resolved today with irrigation.        Subjective:      Claudia Colunga is a 68 year old female who presents for diabetes check and med check.  Her diabetes historically has been well controlled with just diet.  She had been on a statin for short period of time but stopped taking it.  She states her neck was flaring up which is a chronic issue and she thought maybe it was making it worse.  She does not like to take a lot of medications.  She also feels like she has a lot more mucus and coughing up a little bit with a hoarse voice.  She has her colonoscopy next week but she is nervous with her history of A-fib and stopping the Eliquis.  Her  "cardiologist gave the okay.  She is currently in normal sinus and will just occasionally have A-fib and she typically knows when this happens.  She does want to talk to her cardiologist about the Watchman procedure.    Relevant past medical, family, surgical, and social history reviewed with patient, unless noted in HPI, not pertinent for this visit.  Medications were discussed and reconciled.   Review of Systems   A 12 point comprehensive review of systems was negative except as noted.      Current Outpatient Medications   Medication Sig Dispense Refill    ELIQUIS ANTICOAGULANT 5 MG tablet TAKE 1 TABLET BY MOUTH TWICE A  tablet 1    levothyroxine (SYNTHROID/LEVOTHROID) 75 MCG tablet TAKE 1 TABLET BY MOUTH ONCE DAILY AT 6AM 90 tablet 3    metoprolol succinate ER (TOPROL XL) 50 MG 24 hr tablet Take 1 tablet (50 mg) by mouth 2 times daily 180 tablet 3    multivitamin capsule [MULTIVITAMIN CAPSULE] Take 1 capsule by mouth daily.           Objective:     /53   Pulse (!) 47   Temp 98.1  F (36.7  C)   Resp 16   Ht 1.803 m (5' 11\")   Wt 93.7 kg (206 lb 9.6 oz)   SpO2 95%   BMI 28.81 kg/m      Body mass index is 28.81 kg/m .       General appearance: alert, appears stated age and cooperative  Ears: Bilateral cerumen impaction   Lungs: clear to auscultation bilaterally  Heart: regular rate and rhythm, S1, S2 normal, no murmur, click, rub or gallop      Recent Results (from the past 168 hour(s))   Hemoglobin A1c   Result Value Ref Range    Hemoglobin A1C 6.6 (H) 0.0 - 5.6 %          This note has been dictated using voice recognition software. Any grammatical or context distortions are unintentional and inherent to the software  "

## 2023-10-09 ENCOUNTER — TELEPHONE (OUTPATIENT)
Dept: CARDIOLOGY | Facility: CLINIC | Age: 68
End: 2023-10-09
Payer: COMMERCIAL

## 2023-10-09 DIAGNOSIS — I48.0 PAF (PAROXYSMAL ATRIAL FIBRILLATION) (H): Primary | ICD-10-CM

## 2023-10-09 NOTE — TELEPHONE ENCOUNTER
Return call to patient, she states she has noted a fluttering feeling in her chest that is lower down than her typical afib.  She denies chest pain or pressure, shortness of breath, dizziness or lightheadedness.  She is otherwise feeling fine, was in to see her PMD on 10-4-23.     She has multiple questions regarding proceeding with PVI and even possibly watchman.  After our discussion she would like to discuss further with Dr. Bower, will ask our scheduling team to contact her for a time.

## 2023-10-09 NOTE — TELEPHONE ENCOUNTER
Health Call Center    Phone Message    May a detailed message be left on voicemail: yes     Reason for Call: Symptoms or Concerns     If patient has red-flag symptoms, warm transfer to triage line    Current symptom or concern: Radiating feeling on left side on chest (not current)    Symptoms have been present for:  1 week(s)    Patient called requesting to speak with a member of her care team in regards to scheduling an ablation. Please call patient back to further discuss and coordinate, thank you.     Action Taken: Message routed to:  Other: Cardiology    Travel Screening: Not Applicable    Thank you!  [facility]

## 2023-11-06 ENCOUNTER — OFFICE VISIT (OUTPATIENT)
Dept: CARDIOLOGY | Facility: CLINIC | Age: 68
End: 2023-11-06
Attending: INTERNAL MEDICINE
Payer: COMMERCIAL

## 2023-11-06 VITALS
RESPIRATION RATE: 14 BRPM | HEART RATE: 68 BPM | WEIGHT: 208 LBS | DIASTOLIC BLOOD PRESSURE: 84 MMHG | BODY MASS INDEX: 29.01 KG/M2 | SYSTOLIC BLOOD PRESSURE: 122 MMHG

## 2023-11-06 DIAGNOSIS — I48.0 PAF (PAROXYSMAL ATRIAL FIBRILLATION) (H): ICD-10-CM

## 2023-11-06 DIAGNOSIS — I48.0 PAROXYSMAL ATRIAL FIBRILLATION (H): Primary | ICD-10-CM

## 2023-11-06 PROCEDURE — 99214 OFFICE O/P EST MOD 30 MIN: CPT | Performed by: INTERNAL MEDICINE

## 2023-11-06 NOTE — LETTER
2023    Pamella Abraham MD  [address]  [facility] [address]    RE: Claudia Colunga       Dear Colleague,     I had the pleasure of seeing Claudia Colunga in the [facility].     [facility]  Cardiac Electrophysiology  [address]  [place] [address]   Office: [phone]  Fax: [phone]     Cardiac Electrophysiology Follow-up Visit    Patient: Claudia Colunga   : 1955     Primary Care Provider: Pamella Abraham MD    CHIEF COMPLAINT/REASON FOR VISIT  Paroxysmal atrial fibrillation and atrial flutter    Assessment/Recommendations   Claudia Colunga is a 67 year old female with paroxysmal atrial fibrillation and atrial flutter, typical atrial flutter with prior CTI ablation (2020), HTN, diet controlled T2DM, hypothyroidism referred by Dr. Paulino for consultation regarding atrial fibrillation and atria flutter.    Paroxysmal atrial fibrillation and atrial flutter - symptomatic with palpitations.  Prior CTI ablation 2020  [id number]  We reviewed atrial fibrillation and atrial flutter physiology and management considerations including managing stroke risk, rate control, cardioversion, antiarrhythmic drug therapy, and catheter ablation. We discussed atrial fibrillation and flutter ablation procedures, anticipated success rates, the potential need for re-do ablation vs addition of anti-arrhythmic drugs, procedural risks (including groin bleeding, tamponade, phrenic or esophageal injury, stroke, pulmonary vein stenosis) and recovery expectations.  She is quite anxious about her condition and anticoagulation, and would prefer to follow expectantly for now before committing to ablation, LAAO  - if ablation elected upon, PVI, atrial flutter mapping and ablation, general anesthesia, continue apixaban, hold metoprolol XL 3 days prior  - continue metoprolol XL 50mg daily  - continue apixaban 5mg twice daily - she is concerned about long  term bleeding risk  - we discussed the ongoing importance of lifestyle modification (maintaining a healthy weight, sleep apnea diagnosis and management, alcohol avoidance) as part of a long term strategy for atrial fibrillation management    Follow up: as above         History of Present Illness   Claudia Colunga is a 67 year old female with paroxysmal atrial fibrillation and atrial flutter, typical atrial flutter with prior CTI ablation (8/25/2020), HTN, diet controlled T2DM, hypothyroidism referred by Dr. Paulino for consultation regarding atrial fibrillation and atria flutter.    Mrs. Colunga developed atrial flutter and underwent CTI ablation 8/25/2020.  She has had ER visits 4/17/2023 with atrial flutter and ventricular rate 121bpm treated with DCCV and 5/4/2023 with atrial fibrillation which spontaneously converted to sinus rhythm.  She had been scheduled for atria lfibrillation ablation though did not proceed due to anxiety.    She is anxious regarding recurrent episodes of atrial fibrillation and would like suppress these as definitively as possible.       Physical Examination  Review of Systems   VITALS: /84 (BP Location: Left arm, Patient Position: Sitting, Cuff Size: Adult Large)   Pulse 68   Resp 14   Wt 94.3 kg (208 lb)   BMI 29.01 kg/m    Wt Readings from Last 3 Encounters:   10/04/23 93.7 kg (206 lb 9.6 oz)   09/06/23 95 kg (209 lb 6 oz)   05/31/23 93.3 kg (205 lb 11.2 oz)     CONSTITUTIONAL: well nourished, comfortable, no distress  EYES:  Conjunctivae pink, sclerae clear.    E/N/T:  Oral mucosa pink  RESPIRATORY:  Respiratory effort is normal  CARDIOVASCULAR:  normal S1 and S2  GASTROINTESTINAL:  Abdomen without masses or tenderness  EXTREMITIES:  No clubbing or cyanosis.    MUSCULOSKELETAL:  Overall grossly normal muscle strength  SKIN:  Overall, skin warm and dry, no lesions.  NEURO/PSYCH:  Oriented x 3 with normal affect.   Constitutional:  No weight loss or loss of appetite    Eyes:   No difficulty with vision, no double vision, no dry eyes  ENT:  No sore throat, difficulty swallowing; changes in hearing or tinnitus  Cardiovascular: As detailed above  Respiratory:  No cough  Musculoskeletal  No joint pain, muscle aches  Neurologic:  No syncope, lightheadedness, fainting spells   Hematologic: No easy bruising, excessive bleeding tendency   Gastrointestinal:  No jaundice, abdominal pain or abdominal bloating  Genitourinary: No changes in urinary habits, no trouble urinating    Psychiatric: No anxiety or depression      Medical History  Surgical History   Past Medical History:   Diagnosis Date    Anxiety     Atrial flutter (H)     Diabetes mellitus, type II (H)     Eosinophilic esophagitis     Essential hypertension     Hypothyroid     Overflow incontinence     Typical atrial flutter (H) 6/23/2020 8/25/2020 right CTI flutter ablation and developed atrial fib post  ablation     Past Surgical History:   Procedure Laterality Date    BIOPSY OF BREAST, INCISIONAL      BIOPSY OF BREAST, INCISIONAL      EP ABLATION AFLUTTER Right 8/25/2020    Procedure: EP Ablation Atrial Flutter;  Surgeon: Vick Adams MD;  Location: [facility];  Service: Cardiology    OVARIAN CYST SURGERY           Family History Social History   Family History   Problem Relation Age of Onset    Dementia Mother 91    Hip fracture Mother 93        went to nursing home after this    Cerebrovascular Disease Mother 96        had to be fed by hand prior to her death    Colon Cancer Father 70    Aortic aneurysm Father         abdominal, repaired    Cerebral aneurysm Father     Peripheral Vascular Disease Father 96        ruptured femoral aneurysm?    Diabetes Type 2  Sister     Breast Cancer Maternal Cousin     Heart Disease No family hx of         Social History     Tobacco Use    Smoking status: Never     Passive exposure: Never    Smokeless tobacco: Never   Vaping Use    Vaping Use: Never used   Substance Use Topics     Alcohol use: Yes     Comment: Alcoholic Drinks/day: rare, only with dinner out with friends    Drug use: No         Medications  Allergies     Current Outpatient Medications:     ELIQUIS ANTICOAGULANT 5 MG tablet, TAKE 1 TABLET BY MOUTH TWICE A DAY, Disp: 180 tablet, Rfl: 1    levothyroxine (SYNTHROID/LEVOTHROID) 75 MCG tablet, TAKE 1 TABLET BY MOUTH ONCE DAILY AT 6AM, Disp: 90 tablet, Rfl: 3    metoprolol succinate ER (TOPROL XL) 50 MG 24 hr tablet, Take 1 tablet (50 mg) by mouth 2 times daily, Disp: 180 tablet, Rfl: 3    multivitamin capsule, [MULTIVITAMIN CAPSULE] Take 1 capsule by mouth daily., Disp: , Rfl:      Allergies   Allergen Reactions    Lisinopril Rash    Penicillins Rash          Lab Results    Chemistry CBC Cardiac Enzymes/BNP/TSH/INR   Recent Labs   Lab Test 05/04/23  2255      POTASSIUM 4.5   CHLORIDE 105   CO2 26   *   BUN 15.3   CR 0.81   GFRESTIMATED 79   AMARJIT 9.2     Recent Labs   Lab Test 05/04/23 2255 04/17/23  0133 12/07/22  1131   CR 0.81 0.72 0.70          Recent Labs   Lab Test 05/04/23  2255   WBC 10.1   HGB 13.9   HCT 42.4   MCV 95        Recent Labs   Lab Test 05/04/23  2255 04/17/23  0133 12/07/22  1131   HGB 13.9 14.6 14.2    Recent Labs   Lab Test 05/06/21  0251 06/23/20  0222   TROPONINI <0.01 <0.01     No results for input(s): BNP, NTBNPI, NTBNP in the last 98994 hours.  Recent Labs   Lab Test 04/17/23 0133   TSH 1.57     No results for input(s): INR in the last 61460 hours.      Data Review    ECGs (tracings independently reviewed)  5/4/2023 (post DCCV) - SR 93bpm, RBBB and LAFB QRS 150ms  5/4/2023 - AF, ventricular rate 127bpm, RBBB and LAFB  4/19/2023 - SR 60bpm, RBBB and LAFB  4/17/2023 - AFl, 2:1 AV conduction, ventricular rate 121bpm      11/2020 cardiac monitoring (independently reviewed)  1.  Normal multiday monitor with rare singular noncomplex ectopy.  2.  Although a bundle branch block pattern is present, no high-grade AV block noted.    5/18/2023  TTE  1. The left ventricle is normal in size. Left ventricular function is  normal.The ejection fraction is 60-65%. There is mild concentric left  ventricular hypertrophy.  No regional wall motion abnormalities noted.  2. Normal right ventricle size and systolic function.  3. Normal left atrial size.  4. No hemodynamically significant valvular abnormalities on 2D or color flow  imaging.       Cc: Barb Paulino MD, Pamella Abraham MD Amila Dilusha William, MD  11/6/2023  10:32 AM      Thank you for allowing me to participate in the care of your patient.      Sincerely,     Kunal Bower MD     [facility]  cc:   Kunal Bower MD  [address]  [place] [address]

## 2023-11-06 NOTE — PATIENT INSTRUCTIONS
[facility]  Cardiac Electrophysiology  [address]  [place] [address]   Office: [phone]  Fax: [phone]       Thank you for seeing us in clinic today - it is a pleasure to be a part of your care team.  Below is a summary of our plan from today's visit.    You have paroxysmal atrial fibrillation and atrial flutter, presently being managed with metoprolol XL and apixaban.  We reviewed physiology and management options including antiarrhythmic drug therapy, catheter ablation and lifestyle modification.  We will plan for the following:  - consider options further, and let us know with any questions or decision as to how you would like to proceed  - continue metoprolol XL 50mg daily  - continue apixaban 5mg twice daily     Please do not hesitate to be in touch with our office at [phone] with any questions that may arise.       Thank you for trusting us with your care,    Kunal Bower MD  Clinical Cardiac Electrophysiology  [facility]  [address]  [place] [address]   Office: [phone]  Fax: [phone]              ATRIAL FIBRILLATION: Patient Information    What is atrial fibrillation?  Atrial fibrillation (AF, A-fib) is a common heart rhythm problem (arrhythmia) occurring within the upper chambers of the heart (the atria).  In normal rhythm, the upper and lower chambers of the heart are electrically driven to contract in a coordinated sequence.  In atrial fibrillation, the atria lose their ability to contract because of rapid and chaotic electrical activity.  The lower chambers of the heart (the ventricles) continue to pump blood throughout the body, though with irregular and often faster rate due to the chaotic activity within the atria.        How do I know if I have atrial fibrillation?   Some people may feel their heart beating faster, harder, or irregularly while in atrial fibrillation.  Others may be  lightheaded, fatigued, feel weak or tired or become more short of breath especially with activities.  Some patients have no symptoms at all.  Atrial fibrillation may be found due to an irregular pulse or on an electrocardiogram (ECG). Atrial fibrillation can start and stop on its own, and episodes can last from seconds to several months.      How common is atrial fibrillation?   An estimated 3-6 million people in the [place] have atrial fibrillation.  Atrial fibrillation is a common heart rhythm problem for older persons, affecting as estimated 12-15% of people over the age of 65 years of age.    What causes atrial fibrillation?   Age is the most important risk factor for atrial fibrillation.  Atrial fibrillation is more common in people with other heart disease, high blood pressure, diabetes, obesity, sleep apnea and in people who regularly consume alcohol.  Surgery, lung disease, or thyroid problems can lead to atrial fibrillation.  Atrial fibrillation has multiple possible causes, and in most cases a single cause cannot be found.  Atrial fibrillation is a progressive condition, usually starting with at an early stage with short and infrequent episodes.  In later stages of disease, more frequent and longer lasting episodes of atrial fibrillation occur, ultimately culminating in episodes which do not spontaneously terminate.  Generally, more enlargement and scarring within the upper chambers of the heart is observed as atrial fibrillation progresses from early to late-stage disease.    How is atrial fibrillation diagnosed and evaluated?    Because of its start-stop nature, atrial fibrillation can be challenging to diagnose.  Atrial fibrillation is most commonly diagnosed via cardiac rhythm recordings - either an ECG or wearable cardiac rhythm monitor.  For patients with pacemakers, defibrillators or implantable loop recorders, atrial fibrillation may be recorded via these devices.  Recently, commercially  available devices (eg. Apple Watch, RetAPPsa device, certain FitBit devices, others) can allow patients to take 30 second cardiac rhythm recordings which may document atrial fibrillation.  Once atrial fibrillation is diagnosed, additional tests include blood tests and an echocardiogram.  The echocardiogram uses ultrasound to look at your heart to assess your cardiac function and evaluate for other heart disease.  Additional evaluation may include CT or MRI studies.    Is atrial fibrillation dangerous?   Atrial fibrillation is not usually a life-threatening arrhythmia.  The most serious consequences of atrial fibrillation including stroke and worsening of overall cardiac function.  While in atrial fibrillation, the upper cardiac chambers do not contract normally, resulting in slower blood flow and increased risk of clot formation.  If this blood clot becomes detached from the heart a stroke can occur.  Unfortunately, stroke can be the first sign of atrial fibrillation for some people.  With a stroke, you may notice abnormal sensation, weakness on one side of the body or face, changes in your vision or speech.  If you have any of these signs, you should contact EMS and be evaluated in an emergency room as soon as possible.      How is atrial fibrillation treated?     Several treatment options exist for suppressing atrial fibrillation - however, it is not an easily curable arrhythmia.  The first goal in managing atrial fibrillation is to minimize stroke risk.  The second goal is to improve symptoms associated with atrial fibrillation.  Finally, in patients with reduced cardiac function, maintaining normal rhythm can help improve cardiac function.      Blood thinners are used to reduce the risk of stroke in patients with high estimated stroke risk related to atrial fibrillation.  For patients at higher risk of bleeding related to blood thinner use, implantable devices may be an option to reduce stroke risk without the  need for long term blood thinner use.      Atrial fibrillation can be managed via two strategies: rate control and rhythm control.  In a rate control strategy, continued atrial fibrillation is accepted and medications (eg. beta-blockers or calcium channel blockers) are used to control the lower chamber rate.  In a rhythm control strategy, anti-arrhythmic medications or catheter ablation are used to maintain normal cardiac rhythm and slow disease progression by suppressing atrial fibrillation.  A procedure called a cardioversion, in which an electric shock is delivered through patches placed on the chest wall while under deep sedation, can be performed to temporarily restore normal cardiac rhythm, though does not address the chance of atrial fibrillation recurrence.  Treatments are more effective for earlier rather than later stage atrial fibrillation.  Lifestyle modifications (maintaining a healthy weight, aerobic exercise, diagnosing and treating sleep apnea, and minimizing alcohol intake) are important elements of atrial fibrillation rhythm control.     What is catheter ablation for atrial fibrillation?  Cardiac catheter ablation is a commonly performed, minimally invasive procedure performed by a cardiac electrophysiologist to treat many different cardiac rhythm abnormalities.  During catheter ablation, long, thin, flexible tubes are advanced into the heart via small sheaths inserted into the femoral veins and thermal energy (either heating or cooling) is applied within the heart to disrupt abnormal electrical activity.  Atrial fibrillation ablation is performed under general anesthesia, with procedures generally taking approximately 2-3 hours.  Patients are typically observed for 3-5 hours after the ablation, and in most cases can be discharged home the same day.  Atrial fibrillation ablation is associated with better outcomes (mortality, cardiovascular hospitalizations, atrial arrhythmia recurrences) compared  to antiarrhythmic drug therapy.  However, atrial fibrillation recurrences are not uncommon, and repeat catheter ablation may be offered.  Your electrophysiology team can review atrial fibrillation ablation, anticipated success rates, risks, and recovery expectations with you.    What are anti-arrhythmic medications?  Anti-arrhythmic medications are specialized drugs which alter cardiac electrical functioning to suppress arrhythmias.  There are several anti-arrhythmic medications available, each with its own success rate and side effects.  Some anti-arrhythmic medications are less effective though safer to use, others are more effective though have serious potential toxicities.  Atrial fibrillation recurrences are common and may require dose adjustment or change in antiarrhythmic therapy.  Your electrophysiology team will carefully consider which medication would be the best and safest for your particular case.      Can I live a normal life?    The goal of atrial fibrillation management is for patients to live normal lives without being limited by symptoms related to atrial fibrillation.    Are any additional educational resources available?  There are a number of excellent atrial fibrillation education resources available to you online.  A few options you may wish to review include:  hrsonline.org/guide-atrial-fibrillation  afibmatters.org  getsmartaboutafib.com  stopaf.com    What comes next?    Consider your management options and let us know how we can help in your decision process.  Please take medications as they have been prescribed.  You should also get any tests that may have been ordered for you.      When to Call Your Doctor or seek emergency care:  Call your doctor or seek emergency care if you have any significant changes with the following:  Weakness  Dizziness  Fainting  Fatigue  Shortness of breath  Chest pain with increased activity  If you are concerned that your heart rate is too fast or too  slow  Bleeding that does not stop in 10 minutes  Coughing or throwing up blood  Bloody diarrhea or bleeding hemorrhoids  Dark-colored urine or black stool  Allergic reactions:  Rash  Itching  Swelling  Trouble breathing or swallowing      Please call the [facility] at [phone] if you have concerns about your symptoms, your medicines, or your follow-up appointments.

## 2023-11-06 NOTE — PROGRESS NOTES
[facility]  Cardiac Electrophysiology  [address]  [place] [address]   Office: [phone]  Fax: [phone]     Cardiac Electrophysiology Follow-up Visit    Patient: Claudia Colunga   : 1955     Primary Care Provider: Pamella Abraham MD    CHIEF COMPLAINT/REASON FOR VISIT  Paroxysmal atrial fibrillation and atrial flutter    Assessment/Recommendations   Claudia Colunga is a 67 year old female with paroxysmal atrial fibrillation and atrial flutter, typical atrial flutter with prior CTI ablation (2020), HTN, diet controlled T2DM, hypothyroidism referred by Dr. Paulino for consultation regarding atrial fibrillation and atria flutter.    Paroxysmal atrial fibrillation and atrial flutter - symptomatic with palpitations.  Prior CTI ablation 2020  [id number]  We reviewed atrial fibrillation and atrial flutter physiology and management considerations including managing stroke risk, rate control, cardioversion, antiarrhythmic drug therapy, and catheter ablation. We discussed atrial fibrillation and flutter ablation procedures, anticipated success rates, the potential need for re-do ablation vs addition of anti-arrhythmic drugs, procedural risks (including groin bleeding, tamponade, phrenic or esophageal injury, stroke, pulmonary vein stenosis) and recovery expectations.  She is quite anxious about her condition and anticoagulation, and would prefer to follow expectantly for now before committing to ablation, LAAO  - if ablation elected upon, PVI, atrial flutter mapping and ablation, general anesthesia, continue apixaban, hold metoprolol XL 3 days prior  - continue metoprolol XL 50mg daily  - continue apixaban 5mg twice daily - she is concerned about long term bleeding risk  - we discussed the ongoing importance of lifestyle modification (maintaining a healthy weight, sleep apnea diagnosis and management, alcohol avoidance) as part of a long term strategy for  atrial fibrillation management    Follow up: as above         History of Present Illness   Claudia Colunga is a 67 year old female with paroxysmal atrial fibrillation and atrial flutter, typical atrial flutter with prior CTI ablation (8/25/2020), HTN, diet controlled T2DM, hypothyroidism referred by Dr. Paulino for consultation regarding atrial fibrillation and atria flutter.    Mrs. Colunga developed atrial flutter and underwent CTI ablation 8/25/2020.  She has had ER visits 4/17/2023 with atrial flutter and ventricular rate 121bpm treated with DCCV and 5/4/2023 with atrial fibrillation which spontaneously converted to sinus rhythm.  She had been scheduled for atria lfibrillation ablation though did not proceed due to anxiety.    She is anxious regarding recurrent episodes of atrial fibrillation and would like suppress these as definitively as possible.       Physical Examination  Review of Systems   VITALS: /84 (BP Location: Left arm, Patient Position: Sitting, Cuff Size: Adult Large)   Pulse 68   Resp 14   Wt 94.3 kg (208 lb)   BMI 29.01 kg/m    Wt Readings from Last 3 Encounters:   10/04/23 93.7 kg (206 lb 9.6 oz)   09/06/23 95 kg (209 lb 6 oz)   05/31/23 93.3 kg (205 lb 11.2 oz)     CONSTITUTIONAL: well nourished, comfortable, no distress  EYES:  Conjunctivae pink, sclerae clear.    E/N/T:  Oral mucosa pink  RESPIRATORY:  Respiratory effort is normal  CARDIOVASCULAR:  normal S1 and S2  GASTROINTESTINAL:  Abdomen without masses or tenderness  EXTREMITIES:  No clubbing or cyanosis.    MUSCULOSKELETAL:  Overall grossly normal muscle strength  SKIN:  Overall, skin warm and dry, no lesions.  NEURO/PSYCH:  Oriented x 3 with normal affect.   Constitutional:  No weight loss or loss of appetite    Eyes:  No difficulty with vision, no double vision, no dry eyes  ENT:  No sore throat, difficulty swallowing; changes in hearing or tinnitus  Cardiovascular: As detailed above  Respiratory:  No  cough  Musculoskeletal  No joint pain, muscle aches  Neurologic:  No syncope, lightheadedness, fainting spells   Hematologic: No easy bruising, excessive bleeding tendency   Gastrointestinal:  No jaundice, abdominal pain or abdominal bloating  Genitourinary: No changes in urinary habits, no trouble urinating    Psychiatric: No anxiety or depression      Medical History  Surgical History   Past Medical History:   Diagnosis Date     Anxiety      Atrial flutter (H)      Diabetes mellitus, type II (H)      Eosinophilic esophagitis      Essential hypertension      Hypothyroid      Overflow incontinence      Typical atrial flutter (H) 6/23/2020 8/25/2020 right CTI flutter ablation and developed atrial fib post  ablation     Past Surgical History:   Procedure Laterality Date     BIOPSY OF BREAST, INCISIONAL       BIOPSY OF BREAST, INCISIONAL       EP ABLATION AFLUTTER Right 8/25/2020    Procedure: EP Ablation Atrial Flutter;  Surgeon: Vick Adams MD;  Location: [facility];  Service: Cardiology     OVARIAN CYST SURGERY           Family History Social History   Family History   Problem Relation Age of Onset     Dementia Mother 91     Hip fracture Mother 93        went to nursing home after this     Cerebrovascular Disease Mother 96        had to be fed by hand prior to her death     Colon Cancer Father 70     Aortic aneurysm Father         abdominal, repaired     Cerebral aneurysm Father      Peripheral Vascular Disease Father 96        ruptured femoral aneurysm?     Diabetes Type 2  Sister      Breast Cancer Maternal Cousin      Heart Disease No family hx of         Social History     Tobacco Use     Smoking status: Never     Passive exposure: Never     Smokeless tobacco: Never   Vaping Use     Vaping Use: Never used   Substance Use Topics     Alcohol use: Yes     Comment: Alcoholic Drinks/day: rare, only with dinner out with friends     Drug use: No         Medications  Allergies     Current Outpatient  Medications:      ELIQUIS ANTICOAGULANT 5 MG tablet, TAKE 1 TABLET BY MOUTH TWICE A DAY, Disp: 180 tablet, Rfl: 1     levothyroxine (SYNTHROID/LEVOTHROID) 75 MCG tablet, TAKE 1 TABLET BY MOUTH ONCE DAILY AT 6AM, Disp: 90 tablet, Rfl: 3     metoprolol succinate ER (TOPROL XL) 50 MG 24 hr tablet, Take 1 tablet (50 mg) by mouth 2 times daily, Disp: 180 tablet, Rfl: 3     multivitamin capsule, [MULTIVITAMIN CAPSULE] Take 1 capsule by mouth daily., Disp: , Rfl:      Allergies   Allergen Reactions     Lisinopril Rash     Penicillins Rash          Lab Results    Chemistry CBC Cardiac Enzymes/BNP/TSH/INR   Recent Labs   Lab Test 05/04/23 2255      POTASSIUM 4.5   CHLORIDE 105   CO2 26   *   BUN 15.3   CR 0.81   GFRESTIMATED 79   AMARJIT 9.2     Recent Labs   Lab Test 05/04/23 2255 04/17/23  0133 12/07/22  1131   CR 0.81 0.72 0.70          Recent Labs   Lab Test 05/04/23 2255   WBC 10.1   HGB 13.9   HCT 42.4   MCV 95        Recent Labs   Lab Test 05/04/23 2255 04/17/23  0133 12/07/22  1131   HGB 13.9 14.6 14.2    Recent Labs   Lab Test 05/06/21  0251 06/23/20  0222   TROPONINI <0.01 <0.01     No results for input(s): BNP, NTBNPI, NTBNP in the last 83877 hours.  Recent Labs   Lab Test 04/17/23  0133   TSH 1.57     No results for input(s): INR in the last 09128 hours.      Data Review    ECGs (tracings independently reviewed)  5/4/2023 (post DCCV) - SR 93bpm, RBBB and LAFB QRS 150ms  5/4/2023 - AF, ventricular rate 127bpm, RBBB and LAFB  4/19/2023 - SR 60bpm, RBBB and LAFB  4/17/2023 - AFl, 2:1 AV conduction, ventricular rate 121bpm      11/2020 cardiac monitoring (independently reviewed)  1.  Normal multiday monitor with rare singular noncomplex ectopy.  2.  Although a bundle branch block pattern is present, no high-grade AV block noted.    5/18/2023 TTE  1. The left ventricle is normal in size. Left ventricular function is  normal.The ejection fraction is 60-65%. There is mild concentric left  ventricular  hypertrophy.  No regional wall motion abnormalities noted.  2. Normal right ventricle size and systolic function.  3. Normal left atrial size.  4. No hemodynamically significant valvular abnormalities on 2D or color flow  imaging.       Cc: Barb Paulino MD, Pamella Abraham MD Amila Dilusha William, MD  11/6/2023  10:32 AM

## 2023-11-08 ENCOUNTER — TRANSFERRED RECORDS (OUTPATIENT)
Dept: HEALTH INFORMATION MANAGEMENT | Facility: CLINIC | Age: 68
End: 2023-11-08
Payer: COMMERCIAL

## 2023-11-08 LAB — RETINOPATHY: NEGATIVE

## 2024-01-17 ENCOUNTER — TRANSFERRED RECORDS (OUTPATIENT)
Dept: HEALTH INFORMATION MANAGEMENT | Facility: CLINIC | Age: 69
End: 2024-01-17

## 2024-02-26 DIAGNOSIS — I48.3 TYPICAL ATRIAL FLUTTER (H): ICD-10-CM

## 2024-02-26 RX ORDER — APIXABAN 5 MG/1
TABLET, FILM COATED ORAL
Qty: 180 TABLET | Refills: 1 | Status: SHIPPED | OUTPATIENT
Start: 2024-02-26 | End: 2024-07-02

## 2024-04-09 DIAGNOSIS — E03.9 HYPOTHYROIDISM: ICD-10-CM

## 2024-04-10 RX ORDER — LEVOTHYROXINE SODIUM 75 UG/1
TABLET ORAL
Qty: 90 TABLET | Refills: 0 | Status: SHIPPED | OUTPATIENT
Start: 2024-04-10 | End: 2024-07-02

## 2024-04-15 ENCOUNTER — OFFICE VISIT (OUTPATIENT)
Dept: FAMILY MEDICINE | Facility: CLINIC | Age: 69
End: 2024-04-15
Payer: COMMERCIAL

## 2024-04-15 VITALS
DIASTOLIC BLOOD PRESSURE: 84 MMHG | OXYGEN SATURATION: 96 % | WEIGHT: 207.8 LBS | RESPIRATION RATE: 18 BRPM | BODY MASS INDEX: 29.09 KG/M2 | TEMPERATURE: 97.4 F | SYSTOLIC BLOOD PRESSURE: 113 MMHG | HEIGHT: 71 IN | HEART RATE: 57 BPM

## 2024-04-15 DIAGNOSIS — I48.0 PAROXYSMAL ATRIAL FIBRILLATION (H): ICD-10-CM

## 2024-04-15 DIAGNOSIS — E06.3 HYPOTHYROIDISM DUE TO HASHIMOTO'S THYROIDITIS: ICD-10-CM

## 2024-04-15 DIAGNOSIS — I10 ESSENTIAL HYPERTENSION: ICD-10-CM

## 2024-04-15 DIAGNOSIS — Z00.00 ENCOUNTER FOR MEDICARE ANNUAL WELLNESS EXAM: Primary | ICD-10-CM

## 2024-04-15 DIAGNOSIS — M54.2 NECK PAIN: ICD-10-CM

## 2024-04-15 DIAGNOSIS — E11.9 TYPE 2 DIABETES MELLITUS WITHOUT COMPLICATION, WITHOUT LONG-TERM CURRENT USE OF INSULIN (H): ICD-10-CM

## 2024-04-15 DIAGNOSIS — K20.0 EOSINOPHILIC ESOPHAGITIS: Chronic | ICD-10-CM

## 2024-04-15 DIAGNOSIS — E78.00 HYPERCHOLESTEREMIA: ICD-10-CM

## 2024-04-15 LAB
ERYTHROCYTE [DISTWIDTH] IN BLOOD BY AUTOMATED COUNT: 12.5 % (ref 10–15)
HBA1C MFR BLD: 6.5 % (ref 0–5.6)
HCT VFR BLD AUTO: 42.9 % (ref 35–47)
HGB BLD-MCNC: 14 G/DL (ref 11.7–15.7)
MCH RBC QN AUTO: 30.8 PG (ref 26.5–33)
MCHC RBC AUTO-ENTMCNC: 32.6 G/DL (ref 31.5–36.5)
MCV RBC AUTO: 95 FL (ref 78–100)
PLATELET # BLD AUTO: 294 10E3/UL (ref 150–450)
RBC # BLD AUTO: 4.54 10E6/UL (ref 3.8–5.2)
WBC # BLD AUTO: 6.7 10E3/UL (ref 4–11)

## 2024-04-15 PROCEDURE — G0438 PPPS, INITIAL VISIT: HCPCS | Performed by: FAMILY MEDICINE

## 2024-04-15 PROCEDURE — 99214 OFFICE O/P EST MOD 30 MIN: CPT | Mod: 25 | Performed by: FAMILY MEDICINE

## 2024-04-15 PROCEDURE — 83036 HEMOGLOBIN GLYCOSYLATED A1C: CPT | Performed by: FAMILY MEDICINE

## 2024-04-15 PROCEDURE — 80053 COMPREHEN METABOLIC PANEL: CPT | Performed by: FAMILY MEDICINE

## 2024-04-15 PROCEDURE — 82570 ASSAY OF URINE CREATININE: CPT | Performed by: FAMILY MEDICINE

## 2024-04-15 PROCEDURE — 36415 COLL VENOUS BLD VENIPUNCTURE: CPT | Performed by: FAMILY MEDICINE

## 2024-04-15 PROCEDURE — 80061 LIPID PANEL: CPT | Performed by: FAMILY MEDICINE

## 2024-04-15 PROCEDURE — 85027 COMPLETE CBC AUTOMATED: CPT | Performed by: FAMILY MEDICINE

## 2024-04-15 PROCEDURE — 84443 ASSAY THYROID STIM HORMONE: CPT | Performed by: FAMILY MEDICINE

## 2024-04-15 PROCEDURE — 82043 UR ALBUMIN QUANTITATIVE: CPT | Performed by: FAMILY MEDICINE

## 2024-04-15 RX ORDER — CYCLOBENZAPRINE HCL 5 MG
5 TABLET ORAL 3 TIMES DAILY PRN
Qty: 20 TABLET | Refills: 1 | Status: SHIPPED | OUTPATIENT
Start: 2024-04-15

## 2024-04-15 RX ORDER — ATORVASTATIN CALCIUM 20 MG/1
20 TABLET, FILM COATED ORAL DAILY
Qty: 90 TABLET | Refills: 3 | Status: SHIPPED | OUTPATIENT
Start: 2024-04-15

## 2024-04-15 SDOH — HEALTH STABILITY: PHYSICAL HEALTH: ON AVERAGE, HOW MANY DAYS PER WEEK DO YOU ENGAGE IN MODERATE TO STRENUOUS EXERCISE (LIKE A BRISK WALK)?: 5 DAYS

## 2024-04-15 ASSESSMENT — ANXIETY QUESTIONNAIRES
4. TROUBLE RELAXING: NOT AT ALL
6. BECOMING EASILY ANNOYED OR IRRITABLE: NOT AT ALL
3. WORRYING TOO MUCH ABOUT DIFFERENT THINGS: NOT AT ALL
7. FEELING AFRAID AS IF SOMETHING AWFUL MIGHT HAPPEN: NOT AT ALL
GAD7 TOTAL SCORE: 0
8. IF YOU CHECKED OFF ANY PROBLEMS, HOW DIFFICULT HAVE THESE MADE IT FOR YOU TO DO YOUR WORK, TAKE CARE OF THINGS AT HOME, OR GET ALONG WITH OTHER PEOPLE?: NOT DIFFICULT AT ALL
5. BEING SO RESTLESS THAT IT IS HARD TO SIT STILL: NOT AT ALL
IF YOU CHECKED OFF ANY PROBLEMS ON THIS QUESTIONNAIRE, HOW DIFFICULT HAVE THESE PROBLEMS MADE IT FOR YOU TO DO YOUR WORK, TAKE CARE OF THINGS AT HOME, OR GET ALONG WITH OTHER PEOPLE: NOT DIFFICULT AT ALL
2. NOT BEING ABLE TO STOP OR CONTROL WORRYING: NOT AT ALL
GAD7 TOTAL SCORE: 0
1. FEELING NERVOUS, ANXIOUS, OR ON EDGE: NOT AT ALL
7. FEELING AFRAID AS IF SOMETHING AWFUL MIGHT HAPPEN: NOT AT ALL

## 2024-04-15 ASSESSMENT — SOCIAL DETERMINANTS OF HEALTH (SDOH): HOW OFTEN DO YOU GET TOGETHER WITH FRIENDS OR RELATIVES?: MORE THAN THREE TIMES A WEEK

## 2024-04-15 NOTE — PATIENT INSTRUCTIONS
Preventive Care Advice   This is general advice given by our system to help you stay healthy. However, your care team may have specific advice just for you. Please talk to your care team about your preventive care needs.  Nutrition  Eat 5 or more servings of fruits and vegetables each day.  Try wheat bread, brown rice and whole grain pasta (instead of white bread, rice, and pasta).  Get enough calcium and vitamin D. Check the label on foods and aim for 100% of the RDA (recommended daily allowance).  Lifestyle  Exercise at least 150 minutes each week   (30 minutes a day, 5 days a week).  Do muscle strengthening activities 2 days a week. These help control your weight and prevent disease.  No smoking.  Wear sunscreen to prevent skin cancer.  Have a dental exam and cleaning every 6 months.  Yearly exams  See your health care team every year to talk about:  Any changes in your health.  Any medicines your care team has prescribed.  Preventive care, family planning, and ways to prevent chronic diseases.  Shots (vaccines)   HPV shots (up to age 26), if you've never had them before.  Hepatitis B shots (up to age 59), if you've never had them before.  COVID-19 shot: Get this shot when it's due.  Flu shot: Get a flu shot every year.  Tetanus shot: Get a tetanus shot every 10 years.  Pneumococcal, hepatitis A, and RSV shots: Ask your care team if you need these based on your risk.  Shingles shot (for age 50 and up).  General health tests  Diabetes screening:  Starting at age 35, Get screened for diabetes at least every 3 years.  If you are younger than age 35, ask your care team if you should be screened for diabetes.  Cholesterol test: At age 39, start having a cholesterol test every 5 years, or more often if advised.  Bone density scan (DEXA): At age 50, ask your care team if you should have this scan for osteoporosis (brittle bones).  Hepatitis C: Get tested at least once in your life.  STIs (sexually transmitted  infections)  Before age 24: Ask your care team if you should be screened for STIs.  After age 24: Get screened for STIs if you're at risk. You are at risk for STIs (including HIV) if:  You are sexually active with more than one person.  You don't use condoms every time.  You or a partner was diagnosed with a sexually transmitted infection.  If you are at risk for HIV, ask about PrEP medicine to prevent HIV.  Get tested for HIV at least once in your life, whether you are at risk for HIV or not.  Cancer screening tests  Cervical cancer screening: If you have a cervix, begin getting regular cervical cancer screening tests at age 21. Most people who have regular screenings with normal results can stop after age 65. Talk about this with your provider.  Breast cancer scan (mammogram): If you've ever had breasts, begin having regular mammograms starting at age 40. This is a scan to check for breast cancer.  Colon cancer screening: It is important to start screening for colon cancer at age 45.  Have a colonoscopy test every 10 years (or more often if you're at risk) Or, ask your provider about stool tests like a FIT test every year or Cologuard test every 3 years.  To learn more about your testing options, visit: https://www.[employer]/792341.pdf.  For help making a decision, visit: https://bit.ly/am92614.  Prostate cancer screening test: If you have a prostate and are age 55 to 69, ask your provider if you would benefit from a yearly prostate cancer screening test.  Lung cancer screening: If you are a current or former smoker age 50 to 80, ask your care team if ongoing lung cancer screenings are right for you.  For informational purposes only. Not to replace the advice of your health care provider. Copyright   2023 [employer]. All rights reserved. Clinically reviewed by the [facility]. [employer] 940882 - REV 01/24.    Learning About Stress  What is stress?     Stress is your  body's response to a hard situation. Your body can have a physical, emotional, or mental response. Stress is a fact of life for most people, and it affects everyone differently. What causes stress for you may not be stressful for someone else.  A lot of things can cause stress. You may feel stress when you go on a job interview, take a test, or run a race. This kind of short-term stress is normal and even useful. It can help you if you need to work hard or react quickly. For example, stress can help you finish an important job on time.  Long-term stress is caused by ongoing stressful situations or events. Examples of long-term stress include long-term health problems, ongoing problems at work, or conflicts in your family. Long-term stress can harm your health.  How does stress affect your health?  When you are stressed, your body responds as though you are in danger. It makes hormones that speed up your heart, make you breathe faster, and give you a burst of energy. This is called the fight-or-flight stress response. If the stress is over quickly, your body goes back to normal and no harm is done.  But if stress happens too often or lasts too long, it can have bad effects. Long-term stress can make you more likely to get sick, and it can make symptoms of some diseases worse. If you tense up when you are stressed, you may develop neck, shoulder, or low back pain. Stress is linked to high blood pressure and heart disease.  Stress also harms your emotional health. It can make you perdoza, tense, or depressed. Your relationships may suffer, and you may not do well at work or school.  What can you do to manage stress?  You can try these things to help manage stress:   Do something active. Exercise or activity can help reduce stress. Walking is a great way to get started. Even everyday activities such as housecleaning or yard work can help.  Try yoga or ede chi. These techniques combine exercise and meditation. You may need  some training at first to learn them.  Do something you enjoy. For example, listen to music or go to a movie. Practice your hobby or do volunteer work.  Meditate. This can help you relax, because you are not worrying about what happened before or what may happen in the future.  Do guided imagery. Imagine yourself in any setting that helps you feel calm. You can use online videos, books, or a teacher to guide you.  Do breathing exercises. For example:  From a standing position, bend forward from the waist with your knees slightly bent. Let your arms dangle close to the floor.  Breathe in slowly and deeply as you return to a standing position. Roll up slowly and lift your head last.  Hold your breath for just a few seconds in the standing position.  Breathe out slowly and bend forward from the waist.  Let your feelings out. Talk, laugh, cry, and express anger when you need to. Talking with supportive friends or family, a counselor, or a kermit leader about your feelings is a healthy way to relieve stress. Avoid discussing your feelings with people who make you feel worse.  Write. It may help to write about things that are bothering you. This helps you find out how much stress you feel and what is causing it. When you know this, you can find better ways to cope.  What can you do to prevent stress?  You might try some of these things to help prevent stress:  Manage your time. This helps you find time to do the things you want and need to do.  Get enough sleep. Your body recovers from the stresses of the day while you are sleeping.  Get support. Your family, friends, and community can make a difference in how you experience stress.  Limit your news feed. Avoid or limit time on social media or news that may make you feel stressed.  Do something active. Exercise or activity can help reduce stress. Walking is a great way to get started.  Where can you learn more?  Go to https://www.healthwise.net/patiented  Enter N032 in the  "search box to learn more about \"Learning About Stress.\"  Current as of: October 24, 2023               Content Version: 14.0    1479-0561 [employer].   Care instructions adapted under license by your healthcare professional. If you have questions about a medical condition or this instruction, always ask your healthcare professional. [employer] disclaims any warranty or liability for your use of this information.      Bladder Training: Care Instructions  Your Care Instructions     Bladder training is used to treat urge incontinence and stress incontinence. Urge incontinence means that the need to urinate comes on so fast that you can't get to a toilet in time. Stress incontinence means that you leak urine because of pressure on your bladder. For example, it may happen when you laugh, cough, or lift something heavy.  Bladder training can increase how long you can wait before you have to urinate. It can also help your bladder hold more urine. And it can give you better control over the urge to urinate.  It is important to remember that bladder training takes a few weeks to a few months to make a difference. You may not see results right away, but don't give up.  Follow-up care is a key part of your treatment and safety. Be sure to make and go to all appointments, and call your doctor if you are having problems. It's also a good idea to know your test results and keep a list of the medicines you take.  How can you care for yourself at home?  Work with your doctor to come up with a bladder training program that is right for you. You may use one or more of the following methods.  Delayed urination  In the beginning, try to keep from urinating for 5 minutes after you first feel the need to go.  While you wait, take deep, slow breaths to relax. Kegel exercises can also help you delay the need to go to the bathroom.  After some practice, when you can easily wait 5 minutes to urinate, try to wait " "10 minutes before you urinate.  Slowly increase the waiting period until you are able to control when you have to urinate.  Scheduled urination  Empty your bladder when you first wake up in the morning.  Schedule times throughout the day when you will urinate.  Start by going to the bathroom every hour, even if you don't need to go.  Slowly increase the time between trips to the bathroom.  When you have found a schedule that works well for you, keep doing it.  If you wake up during the night and have to urinate, do it. Apply your schedule to waking hours only.  Kegel exercises  These tighten and strengthen pelvic muscles, which can help you control the flow of urine. (If doing these exercises causes pain, stop doing them and talk with your doctor.) To do Kegel exercises:  Squeeze your muscles as if you were trying not to pass gas. Or squeeze your muscles as if you were stopping the flow of urine. Your belly, legs, and buttocks shouldn't move.  Hold the squeeze for 3 seconds, then relax for 5 to 10 seconds.  Start with 3 seconds, then add 1 second each week until you are able to squeeze for 10 seconds.  Repeat the exercise 10 times a session. Do 3 to 8 sessions a day.  When should you call for help?  Watch closely for changes in your health, and be sure to contact your doctor if:    Your incontinence is getting worse.     You do not get better as expected.   Where can you learn more?  Go to https://www.[employer].net/patiented  Enter V684 in the search box to learn more about \"Bladder Training: Care Instructions.\"  Current as of: November 15, 2023               Content Version: 14.0    2719-1612 [employer].   Care instructions adapted under license by your healthcare professional. If you have questions about a medical condition or this instruction, always ask your healthcare professional. [employer] disclaims any warranty or liability for your use of this information.      "

## 2024-04-15 NOTE — PROGRESS NOTES
Preventive Care Visit  [facility]  Pamella Abraham MD, Family Medicine  Apr 15, 2024      1. Encounter for Medicare annual wellness exam  Claudia comes in today for her annual wellness exam.  As far as healthcare maintenance, she is due for mammogram.  She has her colonoscopy set up for next month.  She had a normal bone density in 2021 so is not due again until 2026.  She is due for her tetanus but will have to get this at the pharmacy.  I also recommend that she had the shingles vaccine.    2. Type 2 diabetes mellitus without complication, without long-term current use of insulin (H)  She is also here for diabetic check.  Her A1c is well-controlled at 6.5 with just diet and exercise.  She is on an aspirin and is willing to start a statin now after discussion.  She is up-to-date on eye exam and foot exam was normal today.  - Albumin Random Urine Quantitative with Creat Ratio; Future  - Comprehensive metabolic panel; Future  - Hemoglobin A1c; Future  - Lipid panel reflex to direct LDL Fasting; Future  - CBC with platelets; Future  - Albumin Random Urine Quantitative with Creat Ratio  - Comprehensive metabolic panel  - Hemoglobin A1c  - Lipid panel reflex to direct LDL Fasting  - CBC with platelets    3. Essential hypertension  Her blood pressures under good control with her current medication.    4. Hypothyroidism due to Hashimoto's thyroiditis  Been fairly stable on her current dose of Synthroid.  She is due to have her TSH checked.  - TSH with free T4 reflex; Future  - TSH with free T4 reflex    5. Eosinophilic esophagitis  Stable    6. Paroxysmal atrial fibrillation (H)  She still having some intermittent A-fib.  She does have symptoms when she gets it.  She is on Eliquis and metoprolol.  Her rate is well-controlled.    7. Hypercholesteremia  I did have a long discussion with her regarding her ASCVD 10-year risk being 17.  She also has a diagnosis of type 2 diabetes.  She is willing to  start it this year.  - atorvastatin (LIPITOR) 20 MG tablet; Take 1 tablet (20 mg) by mouth daily  Dispense: 90 tablet; Refill: 3    8. Neck pain  We discussed conservative measures.  Is not happening frequently enough that she would want to do physical therapy.  - cyclobenzaprine (FLEXERIL) 5 MG tablet; Take 1 tablet (5 mg) by mouth 3 times daily as needed for muscle spasms  Dispense: 20 tablet; Refill: 1      Subjective   Claudia is a 68 year old, presenting for the following:  Physical (Fasting. )        4/15/2024    12:50 PM   Additional Questions   Roomed by    Answers submitted by the patient for this visit:  SIMEON-7 (Submitted on 4/15/2024)  SIMEON 7 TOTAL SCORE: 0      Health Care Directive  Patient does not have a Health Care Directive or Living Will: Discussed advance care planning with patient; information given to patient to review.    LEENA Taylor comes in today for her annual wellness exam.  Overall she is doing quite well.  Every couple of months she will have some neck discomfort.  She sees a chiropractor but sometimes when it is really bad ibuprofen and Tylenol do not take care of the pain.  She like to see if a muscle relaxer would be helpful.  We once again discussed starting a statin as she has a diagnosis of type 2 diabetes.  She is been quite reluctant in the past and we talked about it for quite some time.  Her ASCVD score was 17% and we discussed what that meant.  She was more willing to try as we discussed potential adverse side effects.            4/15/2024   General Health   How would you rate your overall physical health? Good   Feel stress (tense, anxious, or unable to sleep) Only a little   (!) STRESS CONCERN      4/15/2024   Nutrition   Diet: Regular (no restrictions)    Diabetic         4/15/2024   Exercise   Days per week of moderate/strenous exercise 5 days         4/15/2024   Social Factors   Frequency of gathering with friends or relatives More than three times a week   Worry food won't  last until get money to buy more No   Food not last or not have enough money for food? No   Do you have housing?  Yes   Are you worried about losing your housing? No   Lack of transportation? No   Unable to get utilities (heat,electricity)? No         4/15/2024   Fall Risk   Fallen 2 or more times in the past year? No   Trouble with walking or balance? No          4/15/2024   Activities of Daily Living- Home Safety   Needs help with the following daily activites None of the above   Safety concerns in the home None of the above         4/15/2024   Dental   Dentist two times every year? Yes         4/15/2024   Hearing Screening   Hearing concerns? None of the above         4/15/2024   Driving Risk Screening   Patient/family members have concerns about driving No         4/15/2024   General Alertness/Fatigue Screening   Have you been more tired than usual lately? No         4/15/2024   Urinary Incontinence Screening   Bothered by leaking urine in past 6 months Yes         4/15/2024   TB Screening   Were you born outside of the [place]? No         Today's PHQ-2 Score:       4/15/2024    12:50 PM   PHQ-2 ( 1999 Pf[employer])   Q1: Little interest or pleasure in doing things 0   Q2: Feeling down, depressed or hopeless 0   PHQ-2 Score 0   Q1: Little interest or pleasure in doing things Not at all   Q2: Feeling down, depressed or hopeless Not at all   PHQ-2 Score 0           4/15/2024   Substance Use   Alcohol more than 3/day or more than 7/wk Not Applicable   Do you have a current opioid prescription? No   How severe/bad is pain from 1 to 10? 0/10 (No Pain)   Do you use any other substances recreationally? No     Social History     Tobacco Use    Smoking status: Never     Passive exposure: Never    Smokeless tobacco: Never   Vaping Use    Vaping status: Never Used   Substance Use Topics    Alcohol use: Yes     Comment: Alcoholic Drinks/day: rare, only with dinner out with friends    Drug use: No           4/13/2023   LAST FHS-7  RESULTS   1st degree relative breast or ovarian cancer No   Any relative bilateral breast cancer No   Any male have breast cancer No   Any ONE woman have BOTH breast AND ovarian cancer No   Any woman with breast cancer before 50yrs No   2 or more relatives with breast AND/OR ovarian cancer No   2 or more relatives with breast AND/OR bowel cancer No        Mammogram Screening - Mammogram every 1-2 years updated in Health Maintenance based on mutual decision making    ASCVD Risk   The 10-year ASCVD risk score (Mehran DK, et al., 2019) is: 17.6%    Values used to calculate the score:      Age: 68 years      Sex: Female      Is Non- : No      Diabetic: Yes      Tobacco smoker: No      Systolic Blood Pressure: 113 mmHg      Is BP treated: Yes      HDL Cholesterol: 39 mg/dL      Total Cholesterol: 190 mg/dL            Reviewed and updated as needed this visit by Provider                    Lab work is in process  Current Outpatient Medications   Medication Sig Dispense Refill    atorvastatin (LIPITOR) 20 MG tablet Take 1 tablet (20 mg) by mouth daily 90 tablet 3    cyclobenzaprine (FLEXERIL) 5 MG tablet Take 1 tablet (5 mg) by mouth 3 times daily as needed for muscle spasms 20 tablet 1    ELIQUIS ANTICOAGULANT 5 MG tablet TAKE 1 TABLET BY MOUTH TWICE A  tablet 1    levothyroxine (SYNTHROID/LEVOTHROID) 75 MCG tablet TAKE 1 TABLET BY MOUTH ONCE DAILY AT 6AM 90 tablet 0    metoprolol succinate ER (TOPROL XL) 50 MG 24 hr tablet Take 1 tablet (50 mg) by mouth 2 times daily 180 tablet 3    multivitamin capsule [MULTIVITAMIN CAPSULE] Take 1 capsule by mouth daily.       Current providers sharing in care for this patient include:  Patient Care Team:  Pamella Abraham MD as PCP - General (Family Medicine)  Deshawn Durham, PharmD as Pharmacist (Pharmacist)  Barb Paulino MD as MD (Cardiovascular Disease)  Pamella Abraham MD as Assigned PCP  Gladys Pa RPH as Pharmacist  aSthish  "Kunal Ott MD as Assigned Heart and Vascular Provider    The following health maintenance items are reviewed in Epic and correct as of today:  Health Maintenance   Topic Date Due    ADVANCE CARE PLANNING  Never done    RSV VACCINE (Pregnancy & 60+) (1 - 1-dose 60+ series) Never done    ZOSTER IMMUNIZATION (2 of 2) 05/27/2015    COLORECTAL CANCER SCREENING  10/23/2022    INFLUENZA VACCINE (1) 09/01/2023    COVID-19 Vaccine (5 - 2023-24 season) 09/01/2023    ANNUAL REVIEW OF HM ORDERS  02/01/2024    DTAP/TDAP/TD IMMUNIZATION (2 - Td or Tdap) 03/12/2024    BMP  05/04/2024    MICROALBUMIN  05/15/2024    TSH W/FREE T4 REFLEX  05/15/2024    LIPID  10/04/2024    A1C  10/15/2024    EYE EXAM  11/08/2024    MAMMO SCREENING  04/13/2025    MEDICARE ANNUAL WELLNESS VISIT  04/15/2025    DIABETIC FOOT EXAM  04/15/2025    FALL RISK ASSESSMENT  04/15/2025    DEXA  04/29/2036    HEPATITIS C SCREENING  Completed    PHQ-2 (once per calendar year)  Completed    Pneumococcal Vaccine: 65+ Years  Completed    IPV IMMUNIZATION  Aged Out    HPV IMMUNIZATION  Aged Out    MENINGITIS IMMUNIZATION  Aged Out    RSV MONOCLONAL ANTIBODY  Aged Out         Review of Systems  Constitutional, HEENT, cardiovascular, pulmonary, GI, , musculoskeletal, neuro, skin, endocrine and psych systems are negative, except as otherwise noted.     Objective    Exam  /84   Pulse 57   Temp 97.4  F (36.3  C)   Resp 18   Ht 1.791 m (5' 10.5\")   Wt 94.3 kg (207 lb 12.8 oz)   SpO2 96%   BMI 29.39 kg/m     Estimated body mass index is 29.39 kg/m  as calculated from the following:    Height as of this encounter: 1.791 m (5' 10.5\").    Weight as of this encounter: 94.3 kg (207 lb 12.8 oz).    Physical Exam  GENERAL: alert and no distress  EYES: Eyes grossly normal to inspection, PERRL and conjunctivae and sclerae normal  HENT: ear canals and TM's normal, nose and mouth without ulcers or lesions  NECK: no adenopathy, no asymmetry, masses, or scars  RESP: " lungs clear to auscultation - no rales, rhonchi or wheezes  BREAST: normal without masses, tenderness or nipple discharge and no palpable axillary masses or adenopathy, 2 scars left upper outer breast  CV: regular rate and rhythm, normal S1 S2, no S3 or S4, no murmur, click or rub, no peripheral edema  ABDOMEN: soft, nontender, no hepatosplenomegaly, no masses and bowel sounds normal  MS: no gross musculoskeletal defects noted, no edema  SKIN: no suspicious lesions or rashes  NEURO: Normal strength and tone, mentation intact and speech normal  PSYCH: mentation appears normal, affect normal/bright   Diabetic foot exam: normal DP and PT pulses, no trophic changes or ulcerative lesions, normal sensory exam, and normal monofilament exam       4/15/2024   Mini Cog   Clock Draw Score 2 Normal   3 Item Recall 3 objects recalled   Mini Cog Total Score 5              Signed Electronically by: Pamella Abraham MD

## 2024-04-16 LAB
ALBUMIN SERPL BCG-MCNC: 4.5 G/DL (ref 3.5–5.2)
ALP SERPL-CCNC: 66 U/L (ref 40–150)
ALT SERPL W P-5'-P-CCNC: 26 U/L (ref 0–50)
ANION GAP SERPL CALCULATED.3IONS-SCNC: 12 MMOL/L (ref 7–15)
AST SERPL W P-5'-P-CCNC: 23 U/L (ref 0–45)
BILIRUB SERPL-MCNC: 1.2 MG/DL
BUN SERPL-MCNC: 14 MG/DL (ref 8–23)
CALCIUM SERPL-MCNC: 9.2 MG/DL (ref 8.8–10.2)
CHLORIDE SERPL-SCNC: 104 MMOL/L (ref 98–107)
CHOLEST SERPL-MCNC: 197 MG/DL
CREAT SERPL-MCNC: 0.77 MG/DL (ref 0.51–0.95)
CREAT UR-MCNC: 233 MG/DL
DEPRECATED HCO3 PLAS-SCNC: 24 MMOL/L (ref 22–29)
EGFRCR SERPLBLD CKD-EPI 2021: 83 ML/MIN/1.73M2
FASTING STATUS PATIENT QL REPORTED: YES
GLUCOSE SERPL-MCNC: 115 MG/DL (ref 70–99)
HDLC SERPL-MCNC: 40 MG/DL
LDLC SERPL CALC-MCNC: 125 MG/DL
MICROALBUMIN UR-MCNC: 16.4 MG/L
MICROALBUMIN/CREAT UR: 7.04 MG/G CR (ref 0–25)
NONHDLC SERPL-MCNC: 157 MG/DL
POTASSIUM SERPL-SCNC: 4.3 MMOL/L (ref 3.4–5.3)
PROT SERPL-MCNC: 7.2 G/DL (ref 6.4–8.3)
SODIUM SERPL-SCNC: 140 MMOL/L (ref 135–145)
TRIGL SERPL-MCNC: 161 MG/DL
TSH SERPL DL<=0.005 MIU/L-ACNC: 1.11 UIU/ML (ref 0.3–4.2)

## 2024-04-24 ENCOUNTER — TELEPHONE (OUTPATIENT)
Dept: CARDIOLOGY | Facility: CLINIC | Age: 69
End: 2024-04-24
Payer: COMMERCIAL

## 2024-04-24 NOTE — TELEPHONE ENCOUNTER
[facility] Call Center    Phone Message    May a detailed message be left on voicemail: yes     Reason for Call: Order(s): Other:   Reason for requested: Hold and bridge orders for Eliquis two day hold prior to Colonoscopy on May 22, If 2 days are not approved please send the last creatinine within the past 90 days  Date needed: May 15th   Provider name: Dr Paulino     Action Taken: Other: Cardiology    Travel Screening: Not Applicable    Thank you!  [facility]

## 2024-04-24 NOTE — TELEPHONE ENCOUNTER
Please address request for Eliquis hold orders for 5-22-24 colonoscopy - yrly follow-up sched on 6-3-24.  mg

## 2024-04-24 NOTE — TELEPHONE ENCOUNTER
Return call to [facility] - informed nurse Madonna of Dr. Paulino's response / recommendations - understanding verbalized.  mg

## 2024-05-08 ENCOUNTER — ANCILLARY PROCEDURE (OUTPATIENT)
Dept: MAMMOGRAPHY | Facility: CLINIC | Age: 69
End: 2024-05-08
Attending: FAMILY MEDICINE
Payer: COMMERCIAL

## 2024-05-08 DIAGNOSIS — Z12.31 VISIT FOR SCREENING MAMMOGRAM: ICD-10-CM

## 2024-05-08 PROCEDURE — 77067 SCR MAMMO BI INCL CAD: CPT

## 2024-05-22 ENCOUNTER — TRANSFERRED RECORDS (OUTPATIENT)
Dept: HEALTH INFORMATION MANAGEMENT | Facility: CLINIC | Age: 69
End: 2024-05-22
Payer: COMMERCIAL

## 2024-05-31 DIAGNOSIS — E03.9 HYPOTHYROIDISM: ICD-10-CM

## 2024-05-31 DIAGNOSIS — I48.3 TYPICAL ATRIAL FLUTTER (H): ICD-10-CM

## 2024-05-31 RX ORDER — METOPROLOL SUCCINATE 50 MG/1
50 TABLET, EXTENDED RELEASE ORAL 2 TIMES DAILY
Qty: 180 TABLET | Refills: 3 | OUTPATIENT
Start: 2024-05-31

## 2024-05-31 RX ORDER — LEVOTHYROXINE SODIUM 75 UG/1
TABLET ORAL
Qty: 90 TABLET | Refills: 0 | OUTPATIENT
Start: 2024-05-31

## 2024-06-03 ENCOUNTER — OFFICE VISIT (OUTPATIENT)
Dept: CARDIOLOGY | Facility: CLINIC | Age: 69
End: 2024-06-03
Payer: COMMERCIAL

## 2024-06-03 VITALS
RESPIRATION RATE: 20 BRPM | OXYGEN SATURATION: 95 % | SYSTOLIC BLOOD PRESSURE: 122 MMHG | BODY MASS INDEX: 29.95 KG/M2 | HEART RATE: 67 BPM | DIASTOLIC BLOOD PRESSURE: 70 MMHG | WEIGHT: 211.7 LBS

## 2024-06-03 DIAGNOSIS — I48.0 PAF (PAROXYSMAL ATRIAL FIBRILLATION) (H): Primary | ICD-10-CM

## 2024-06-03 PROCEDURE — 99214 OFFICE O/P EST MOD 30 MIN: CPT | Performed by: INTERNAL MEDICINE

## 2024-06-03 NOTE — PROGRESS NOTES
"  HEART CARE ENCOUNTER CONSULTATON NOTE      [facility]  [phone]      Assessment/Recommendations   Assessment:  1.  Atrial flutter/fibrillation: Atrial flutter status post ablation 8/25/2020 with recurrent atrial fibrillation/flutter with last significant episode a year ago.  Has declined antiarrhythmic therapy and PVI and prefers to maintain on metoprolol.  2.  Hypertension: Well-controlled  3.  Diabetes mellitus type 2  4.  Dyslipidemia: Will be starting low-dose Lipitor     Plan:  1.  Continue Eliquis 5 mg twice daily  2.  Continue Toprol XL  3.  Start Lipitor 20 mg a day with repeat fasting lipids in a few months time     May follow-up in 1 year         History of Present Illness/Subjective    HPI: Claudia Colunga is a 68 year old female with history of hypertension, diabetes mellitus type 2, typical atrial flutter status post ablation done on 8/25/2020 with atrial fibrillation status post cardioversion during procedure who is here for a follow-up.  She had a recurrence last year with no significant recurrences since that time.  She has been seen in A-fib clinic and was offered ablation and antiarrhythmic therapy was also discussed.  She is not inclined to proceed with either of these options at this time. Every couple months she feels a \"surge\" of atrial fibrillation lasting for few minutes.  No prolonged episodes recently.  Denies any issues with breathing or chest pain.         Physical Examination  Review of Systems   Vitals: /70 (BP Location: Right arm, Patient Position: Sitting, Cuff Size: Adult Regular)   Pulse 67   Resp 20   Wt 96 kg (211 lb 11.2 oz)   SpO2 95%   BMI 29.95 kg/m    BMI= Body mass index is 29.95 kg/m .  Wt Readings from Last 3 Encounters:   06/03/24 96 kg (211 lb 11.2 oz)   04/15/24 94.3 kg (207 lb 12.8 oz)   11/06/23 94.3 kg (208 lb)       General Appearance:   no distress, normal body habitus   ENT/Mouth: membranes moist, no oral lesions or bleeding " gums.      EYES:  no scleral icterus, normal conjunctivae   Neck: no carotid bruits or thyromegaly   Chest/Lungs:   lungs are clear to auscultation   Cardiovascular:   Regular. Normal first and second heart sounds with no murmur  no edema bilaterally    Abdomen:  no organomegaly, masses, bruits, or tenderness; bowel sounds are present   Extremities: no cyanosis or clubbing   Skin: no xanthelasma, warm.    Neurologic: normal  bilateral, no tremors     Psychiatric: alert and oriented x3, calm        Please refer above for cardiac ROS details.        Medical History  Surgical History Family History Social History   Past Medical History:   Diagnosis Date    Anxiety     Atrial flutter (H)     Diabetes mellitus, type II (H)     Eosinophilic esophagitis     Essential hypertension     Hypothyroid     Overflow incontinence     Typical atrial flutter (H) 6/23/2020 8/25/2020 right CTI flutter ablation and developed atrial fib post  ablation      Past Surgical History:   Procedure Laterality Date    BIOPSY OF BREAST, INCISIONAL      BIOPSY OF BREAST, INCISIONAL      EP ABLATION AFLUTTER Right 8/25/2020    Procedure: EP Ablation Atrial Flutter;  Surgeon: Vick Adams MD;  Location: [facility];  Service: Cardiology    OVARIAN CYST SURGERY       Family History   Problem Relation Age of Onset    Dementia Mother 91    Hip fracture Mother 93        went to nursing home after this    Cerebrovascular Disease Mother 96        had to be fed by hand prior to her death    Colon Cancer Father 70    Aortic aneurysm Father         abdominal, repaired    Cerebral aneurysm Father     Peripheral Vascular Disease Father 96        ruptured femoral aneurysm?    Diabetes Type 2  Sister     Breast Cancer Maternal Cousin     Heart Disease No family hx of         Social History     Socioeconomic History    Marital status: Single     Spouse name: Not on file    Number of children: 0    Years of education: Not on file    Highest  education level: Not on file   Occupational History    Not on file   Tobacco Use    Smoking status: Never     Passive exposure: Never    Smokeless tobacco: Never   Vaping Use    Vaping status: Never Used   Substance and Sexual Activity    Alcohol use: Yes     Comment: Alcoholic Drinks/day: rare, only with dinner out with friends    Drug use: No    Sexual activity: Not on file   Other Topics Concern    Not on file   Social History Narrative    She walks her poodle daily for 30-60 minutes. She enjoys going to the [employer] or [place] to paddle in the pool.     Social Determinants of Health     Financial Resource Strain: Low Risk  (4/15/2024)    Financial Resource Strain     Within the past 12 months, have you or your family members you live with been unable to get utilities (heat, electricity) when it was really needed?: No   Food Insecurity: Low Risk  (4/15/2024)    Food Insecurity     Within the past 12 months, did you worry that your food would run out before you got money to buy more?: No     Within the past 12 months, did the food you bought just not last and you didn t have money to get more?: No   Transportation Needs: Low Risk  (4/15/2024)    Transportation Needs     Within the past 12 months, has lack of transportation kept you from medical appointments, getting your medicines, non-medical meetings or appointments, work, or from getting things that you need?: No   Physical Activity: Unknown (4/15/2024)    Exercise Vital Sign     Days of Exercise per Week: 5 days     Minutes of Exercise per Session: Not on file   Stress: No Stress Concern Present (4/15/2024)    [employer] [place] [employer] - Occupational Stress Questionnaire     Feeling of Stress : Only a little   Social Connections: Unknown (4/15/2024)    Social Connection and Isolation Panel [NHANES]     Frequency of Communication with Friends and Family: Not on file     Frequency of Social Gatherings with Friends and Family: More than  three times a week     Attends Yazdanism Services: Not on file     Active Member of Clubs or Organizations: Not on file     Attends Club or Organization Meetings: Not on file     Marital Status: Not on file   Interpersonal Safety: Low Risk  (4/15/2024)    Interpersonal Safety     Do you feel physically and emotionally safe where you currently live?: Yes     Within the past 12 months, have you been hit, slapped, kicked or otherwise physically hurt by someone?: No     Within the past 12 months, have you been humiliated or emotionally abused in other ways by your partner or ex-partner?: No   Housing Stability: Low Risk  (4/15/2024)    Housing Stability     Do you have housing? : Yes     Are you worried about losing your housing?: No           Medications  Allergies   Current Outpatient Medications   Medication Sig Dispense Refill    atorvastatin (LIPITOR) 20 MG tablet Take 1 tablet (20 mg) by mouth daily 90 tablet 3    cyclobenzaprine (FLEXERIL) 5 MG tablet Take 1 tablet (5 mg) by mouth 3 times daily as needed for muscle spasms 20 tablet 1    ELIQUIS ANTICOAGULANT 5 MG tablet TAKE 1 TABLET BY MOUTH TWICE A  tablet 1    levothyroxine (SYNTHROID/LEVOTHROID) 75 MCG tablet TAKE 1 TABLET BY MOUTH ONCE DAILY AT 6AM 90 tablet 0    metoprolol succinate ER (TOPROL XL) 50 MG 24 hr tablet Take 1 tablet (50 mg) by mouth 2 times daily 180 tablet 3    multivitamin capsule [MULTIVITAMIN CAPSULE] Take 1 capsule by mouth daily.         Allergies   Allergen Reactions    Lisinopril Rash    Penicillins Rash          Lab Results    Chemistry/lipid CBC Cardiac Enzymes/BNP/TSH/INR   Recent Labs   Lab Test 04/15/24  1325   CHOL 197   HDL 40*   *   TRIG 161*     Recent Labs   Lab Test 04/15/24  1325 10/04/23  1031 12/07/22  1131   * 119* 109*     Recent Labs   Lab Test 04/15/24  1325      POTASSIUM 4.3   CHLORIDE 104   CO2 24   *   BUN 14.0   CR 0.77   GFRESTIMATED 83   AMARJIT 9.2     Recent Labs   Lab Test  "04/15/24  1325 05/04/23  2255 04/17/23  0133   CR 0.77 0.81 0.72     Recent Labs   Lab Test 04/15/24  1325 10/04/23  1031 05/15/23  1441   A1C 6.5* 6.6* 6.6*          Recent Labs   Lab Test 04/15/24  1325   WBC 6.7   HGB 14.0   HCT 42.9   MCV 95        Recent Labs   Lab Test 04/15/24  1325 05/04/23  2255 04/17/23  0133   HGB 14.0 13.9 14.6    Recent Labs   Lab Test 05/06/21  0251 06/23/20  0222   TROPONINI <0.01 <0.01     No results for input(s): \"BNP\", \"NTBNPI\", \"NTBNP\" in the last 00058 hours.  Recent Labs   Lab Test 04/15/24  1325   TSH 1.11     No results for input(s): \"INR\" in the last 23237 hours.     Barb Paulino MD                                      "

## 2024-06-03 NOTE — LETTER
"6/3/2024    Pamella Abraham MD  [address]  [facility] [address]    RE: Claudia Colunga       Dear Colleague,     I had the pleasure of seeing Claudia Colunga in the [facility].    HEART CARE ENCOUNTER CONSULTATON NOTE      [facility]  [phone]      Assessment/Recommendations   Assessment:  1.  Atrial flutter/fibrillation: Atrial flutter status post ablation 8/25/2020 with recurrent atrial fibrillation/flutter with last significant episode a year ago.  Has declined antiarrhythmic therapy and PVI and prefers to maintain on metoprolol.  2.  Hypertension: Well-controlled  3.  Diabetes mellitus type 2  4.  Dyslipidemia: Will be starting low-dose Lipitor     Plan:  1.  Continue Eliquis 5 mg twice daily  2.  Continue Toprol XL  3.  Start Lipitor 20 mg a day with repeat fasting lipids in a few months time     May follow-up in 1 year         History of Present Illness/Subjective    HPI: Claudia Colunga is a 68 year old female with history of hypertension, diabetes mellitus type 2, typical atrial flutter status post ablation done on 8/25/2020 with atrial fibrillation status post cardioversion during procedure who is here for a follow-up.  She had a recurrence last year with no significant recurrences since that time.  She has been seen in A-fib clinic and was offered ablation and antiarrhythmic therapy was also discussed.  She is not inclined to proceed with either of these options at this time. Every couple months she feels a \"surge\" of atrial fibrillation lasting for few minutes.  No prolonged episodes recently.  Denies any issues with breathing or chest pain.         Physical Examination  Review of Systems   Vitals: /70 (BP Location: Right arm, Patient Position: Sitting, Cuff Size: Adult Regular)   Pulse 67   Resp 20   Wt 96 kg (211 lb 11.2 oz)   SpO2 95%   BMI 29.95 kg/m    BMI= Body mass index is 29.95 kg/m .  Wt Readings from Last 3 Encounters:   06/03/24 96 " kg (211 lb 11.2 oz)   04/15/24 94.3 kg (207 lb 12.8 oz)   11/06/23 94.3 kg (208 lb)       General Appearance:   no distress, normal body habitus   ENT/Mouth: membranes moist, no oral lesions or bleeding gums.      EYES:  no scleral icterus, normal conjunctivae   Neck: no carotid bruits or thyromegaly   Chest/Lungs:   lungs are clear to auscultation   Cardiovascular:   Regular. Normal first and second heart sounds with no murmur  no edema bilaterally    Abdomen:  no organomegaly, masses, bruits, or tenderness; bowel sounds are present   Extremities: no cyanosis or clubbing   Skin: no xanthelasma, warm.    Neurologic: normal  bilateral, no tremors     Psychiatric: alert and oriented x3, calm        Please refer above for cardiac ROS details.        Medical History  Surgical History Family History Social History   Past Medical History:   Diagnosis Date    Anxiety     Atrial flutter (H)     Diabetes mellitus, type II (H)     Eosinophilic esophagitis     Essential hypertension     Hypothyroid     Overflow incontinence     Typical atrial flutter (H) 6/23/2020 8/25/2020 right CTI flutter ablation and developed atrial fib post  ablation      Past Surgical History:   Procedure Laterality Date    BIOPSY OF BREAST, INCISIONAL      BIOPSY OF BREAST, INCISIONAL      EP ABLATION AFLUTTER Right 8/25/2020    Procedure: EP Ablation Atrial Flutter;  Surgeon: Vick Adams MD;  Location: [facility] Cath Lab;  Service: Cardiology    OVARIAN CYST SURGERY       Family History   Problem Relation Age of Onset    Dementia Mother 91    Hip fracture Mother 93        went to nursing home after this    Cerebrovascular Disease Mother 96        had to be fed by hand prior to her death    Colon Cancer Father 70    Aortic aneurysm Father         abdominal, repaired    Cerebral aneurysm Father     Peripheral Vascular Disease Father 96        ruptured femoral aneurysm?    Diabetes Type 2  Sister     Breast Cancer Maternal Cousin     Heart  Disease No family hx of         Social History     Socioeconomic History    Marital status: Single     Spouse name: Not on file    Number of children: 0    Years of education: Not on file    Highest education level: Not on file   Occupational History    Not on file   Tobacco Use    Smoking status: Never     Passive exposure: Never    Smokeless tobacco: Never   Vaping Use    Vaping status: Never Used   Substance and Sexual Activity    Alcohol use: Yes     Comment: Alcoholic Drinks/day: rare, only with dinner out with friends    Drug use: No    Sexual activity: Not on file   Other Topics Concern    Not on file   Social History Narrative    She walks her poodle daily for 30-60 minutes. She enjoys going to the [employer] or [employer] to paddle in the pool.     Social Determinants of Health     Financial Resource Strain: Low Risk  (4/15/2024)    Financial Resource Strain     Within the past 12 months, have you or your family members you live with been unable to get utilities (heat, electricity) when it was really needed?: No   Food Insecurity: Low Risk  (4/15/2024)    Food Insecurity     Within the past 12 months, did you worry that your food would run out before you got money to buy more?: No     Within the past 12 months, did the food you bought just not last and you didn t have money to get more?: No   Transportation Needs: Low Risk  (4/15/2024)    Transportation Needs     Within the past 12 months, has lack of transportation kept you from medical appointments, getting your medicines, non-medical meetings or appointments, work, or from getting things that you need?: No   Physical Activity: Unknown (4/15/2024)    Exercise Vital Sign     Days of Exercise per Week: 5 days     Minutes of Exercise per Session: Not on file   Stress: No Stress Concern Present (4/15/2024)    [employer] [place] [employer]ational [employer] - Occupational Stress Questionnaire     Feeling of Stress : Only a little   Social Connections: Unknown  (4/15/2024)    Social Connection and Isolation Panel [NHANES]     Frequency of Communication with Friends and Family: Not on file     Frequency of Social Gatherings with Friends and Family: More than three times a week     Attends Druze Services: Not on file     Active Member of Clubs or Organizations: Not on file     Attends Club or Organization Meetings: Not on file     Marital Status: Not on file   Interpersonal Safety: Low Risk  (4/15/2024)    Interpersonal Safety     Do you feel physically and emotionally safe where you currently live?: Yes     Within the past 12 months, have you been hit, slapped, kicked or otherwise physically hurt by someone?: No     Within the past 12 months, have you been humiliated or emotionally abused in other ways by your partner or ex-partner?: No   Housing Stability: Low Risk  (4/15/2024)    Housing Stability     Do you have housing? : Yes     Are you worried about losing your housing?: No           Medications  Allergies   Current Outpatient Medications   Medication Sig Dispense Refill    atorvastatin (LIPITOR) 20 MG tablet Take 1 tablet (20 mg) by mouth daily 90 tablet 3    cyclobenzaprine (FLEXERIL) 5 MG tablet Take 1 tablet (5 mg) by mouth 3 times daily as needed for muscle spasms 20 tablet 1    ELIQUIS ANTICOAGULANT 5 MG tablet TAKE 1 TABLET BY MOUTH TWICE A  tablet 1    levothyroxine (SYNTHROID/LEVOTHROID) 75 MCG tablet TAKE 1 TABLET BY MOUTH ONCE DAILY AT 6AM 90 tablet 0    metoprolol succinate ER (TOPROL XL) 50 MG 24 hr tablet Take 1 tablet (50 mg) by mouth 2 times daily 180 tablet 3    multivitamin capsule [MULTIVITAMIN CAPSULE] Take 1 capsule by mouth daily.         Allergies   Allergen Reactions    Lisinopril Rash    Penicillins Rash          Lab Results    Chemistry/lipid CBC Cardiac Enzymes/BNP/TSH/INR   Recent Labs   Lab Test 04/15/24  1325   CHOL 197   HDL 40*   *   TRIG 161*     Recent Labs   Lab Test 04/15/24  1325 10/04/23  1031 12/07/22  1131  "  * 119* 109*     Recent Labs   Lab Test 04/15/24  1325      POTASSIUM 4.3   CHLORIDE 104   CO2 24   *   BUN 14.0   CR 0.77   GFRESTIMATED 83   AMARJIT 9.2     Recent Labs   Lab Test 04/15/24  1325 05/04/23  2255 04/17/23  0133   CR 0.77 0.81 0.72     Recent Labs   Lab Test 04/15/24  1325 10/04/23  1031 05/15/23  1441   A1C 6.5* 6.6* 6.6*          Recent Labs   Lab Test 04/15/24  1325   WBC 6.7   HGB 14.0   HCT 42.9   MCV 95        Recent Labs   Lab Test 04/15/24  1325 05/04/23  2255 04/17/23  0133   HGB 14.0 13.9 14.6    Recent Labs   Lab Test 05/06/21  0251 06/23/20  0222   TROPONINI <0.01 <0.01     No results for input(s): \"BNP\", \"NTBNPI\", \"NTBNP\" in the last 09452 hours.  Recent Labs   Lab Test 04/15/24  1325   TSH 1.11     No results for input(s): \"INR\" in the last 53270 hours.     Barb aPulino MD            Thank you for allowing me to participate in the care of your patient.      Sincerely,     Barb Paulino MD     [facility]  cc:   Tasia Arboleda, NP  [address]  [place] [address]      "

## 2024-07-02 DIAGNOSIS — E03.9 HYPOTHYROIDISM: ICD-10-CM

## 2024-07-02 DIAGNOSIS — I48.3 TYPICAL ATRIAL FLUTTER (H): ICD-10-CM

## 2024-07-02 RX ORDER — METOPROLOL SUCCINATE 50 MG/1
50 TABLET, EXTENDED RELEASE ORAL 2 TIMES DAILY
Qty: 180 TABLET | Refills: 2 | Status: SHIPPED | OUTPATIENT
Start: 2024-07-02

## 2024-07-02 RX ORDER — LEVOTHYROXINE SODIUM 75 UG/1
TABLET ORAL
Qty: 90 TABLET | Refills: 2 | Status: SHIPPED | OUTPATIENT
Start: 2024-07-02

## 2024-07-02 RX ORDER — APIXABAN 5 MG/1
TABLET, FILM COATED ORAL
Qty: 180 TABLET | Refills: 1 | Status: SHIPPED | OUTPATIENT
Start: 2024-07-02

## 2024-10-20 ENCOUNTER — HEALTH MAINTENANCE LETTER (OUTPATIENT)
Age: 69
End: 2024-10-20

## 2024-11-06 ENCOUNTER — NURSE TRIAGE (OUTPATIENT)
Dept: FAMILY MEDICINE | Facility: CLINIC | Age: 69
End: 2024-11-06

## 2024-11-06 ENCOUNTER — OFFICE VISIT (OUTPATIENT)
Dept: FAMILY MEDICINE | Facility: CLINIC | Age: 69
End: 2024-11-06
Payer: COMMERCIAL

## 2024-11-06 VITALS
HEIGHT: 71 IN | WEIGHT: 211.8 LBS | RESPIRATION RATE: 16 BRPM | OXYGEN SATURATION: 98 % | TEMPERATURE: 98.4 F | HEART RATE: 76 BPM | DIASTOLIC BLOOD PRESSURE: 91 MMHG | SYSTOLIC BLOOD PRESSURE: 126 MMHG | BODY MASS INDEX: 29.65 KG/M2

## 2024-11-06 DIAGNOSIS — E78.00 HYPERCHOLESTEREMIA: ICD-10-CM

## 2024-11-06 DIAGNOSIS — M25.512 ACUTE PAIN OF LEFT SHOULDER: Primary | ICD-10-CM

## 2024-11-06 DIAGNOSIS — E11.9 TYPE 2 DIABETES MELLITUS WITHOUT COMPLICATION, WITHOUT LONG-TERM CURRENT USE OF INSULIN (H): ICD-10-CM

## 2024-11-06 DIAGNOSIS — I10 ESSENTIAL HYPERTENSION: Chronic | ICD-10-CM

## 2024-11-06 DIAGNOSIS — I48.0 PAROXYSMAL ATRIAL FIBRILLATION (H): ICD-10-CM

## 2024-11-06 LAB
CHOLEST SERPL-MCNC: 127 MG/DL
EST. AVERAGE GLUCOSE BLD GHB EST-MCNC: 151 MG/DL
FASTING STATUS PATIENT QL REPORTED: YES
HBA1C MFR BLD: 6.9 % (ref 0–5.6)
HDLC SERPL-MCNC: 45 MG/DL
LDLC SERPL CALC-MCNC: 62 MG/DL
NONHDLC SERPL-MCNC: 82 MG/DL
TRIGL SERPL-MCNC: 99 MG/DL

## 2024-11-06 PROCEDURE — 90480 ADMN SARSCOV2 VAC 1/ONLY CMP: CPT | Performed by: PHYSICIAN ASSISTANT

## 2024-11-06 PROCEDURE — 93005 ELECTROCARDIOGRAM TRACING: CPT | Performed by: PHYSICIAN ASSISTANT

## 2024-11-06 PROCEDURE — 93010 ELECTROCARDIOGRAM REPORT: CPT | Performed by: INTERNAL MEDICINE

## 2024-11-06 PROCEDURE — 99214 OFFICE O/P EST MOD 30 MIN: CPT | Mod: 25 | Performed by: PHYSICIAN ASSISTANT

## 2024-11-06 PROCEDURE — 80061 LIPID PANEL: CPT | Performed by: PHYSICIAN ASSISTANT

## 2024-11-06 PROCEDURE — 36415 COLL VENOUS BLD VENIPUNCTURE: CPT | Performed by: PHYSICIAN ASSISTANT

## 2024-11-06 PROCEDURE — 91320 SARSCV2 VAC 30MCG TRS-SUC IM: CPT | Performed by: PHYSICIAN ASSISTANT

## 2024-11-06 PROCEDURE — 83036 HEMOGLOBIN GLYCOSYLATED A1C: CPT | Performed by: PHYSICIAN ASSISTANT

## 2024-11-06 NOTE — TELEPHONE ENCOUNTER
Nurse Triage SBAR    Is this a 2nd Level Triage? YES, LICENSED PRACTITIONER REVIEW IS REQUIRED    Situation: Patient is calling in to request an OV for ongoing left shoulder pain    Background: History of HTN and A fib.      Assessment: Left shoulder pain ongoing since early October.  Patient states that the pain is worse when she is active, sometimes with just walking.  Pain is reduced with rest.  Most pain felt was an 8 on pain scale.  Denies SOB.  States that she does experience occasional tingling in her left arm.      Protocol Recommended Disposition:   Go To Office Now    Recommendation: Appointment scheduled with Siri LI at 10:00 am.  Please advise on this appointment for disposition.      Routed to provider    Does the patient meet one of the following criteria for ADS visit consideration? 16+ years old, with an [facility] PCP     TIP  Providers, please consider if this condition is appropriate for management at one of our Acute and Diagnostic Services sites.     If patient is a good candidate, please use dotphrase <dot>triageresponse and select Refer to ADS to document.     Left shoulder pain and under shoulder.    Reason for Disposition   Shoulder pains with exertion (e.g., walking) and pain goes away on resting and not present now    Additional Information   Negative: Shock suspected (e.g., cold/pale/clammy skin, too weak to stand, low BP, rapid pulse)   Negative: Similar pain previously and it was from 'heart attack'   Negative: Similar pain previously and it was from 'angina' and not relieved by nitroglycerin   Negative: Sounds like a life-threatening emergency to the triager   Negative: Chest pain   Negative: Followed an injury to shoulder   Negative: Difficulty breathing or unusual sweating (e.g., sweating without exertion)   Negative: Pain lasting > 5 minutes and pain also present in chest  (Exception: Pain is clearly made worse by movement.)   Negative: Age > 40 and no obvious cause and pain even  "when not moving the arm  (Exception: Pain is clearly made worse by moving arm or bending neck.)   Negative: Red area or streak and fever   Negative: Swollen joint and fever   Negative: Entire arm is swollen   Negative: Patient sounds very sick or weak to the triager    Answer Assessment - Initial Assessment Questions  1. ONSET: \"When did the pain start?\"     Early October  2. LOCATION: \"Where is the pain located?\"      Under left shoulder blade into the shoulder  3. PAIN: \"How bad is the pain?\" (Scale 1-10; or mild, moderate, severe)    - MILD (1-3): doesn't interfere with normal activities    - MODERATE (4-7): interferes with normal activities (e.g., work or school) or awakens from sleep    - SEVERE (8-10): excruciating pain, unable to do any normal activities, unable to move arm at all due to pain      8 at worse, with movement  4. WORK OR EXERCISE: \"Has there been any recent work or exercise that involved this part of the body?\"      Not as active now, trying to protect it.   5. CAUSE: \"What do you think is causing the shoulder pain?\"      Concerned with heart, but is unknown  6. OTHER SYMPTOMS: \"Do you have any other symptoms?\" (e.g., neck pain, swelling, rash, fever, numbness, weakness)      Little tingling of left are/hand occasionsally    Protocols used: Shoulder Pain-A-OH    "

## 2024-11-06 NOTE — PROGRESS NOTES
"  Assessment & Plan     Acute pain of left shoulder  Paroxysmal atrial fibrillation (H)  Essential hypertension  Patient presents with left shoulder pain ongoing for 2 weeks, no known injury but does garden a lot.  She has trouble with pain at rest and worse at times, no exertional dyspnea or exertional angina.  EKG done given cardiac history, no significant changes from previous tracings.  On exam, does have limited posterior flexion ROM and + empty can and push off test. Suspect rotator cuff injury. Referral to Ortho given.  Follow up if symptoms worsen or do not improve, discussed if chest pain, shortness of breath, exertional chest pain should go to ER.    - EKG 12-lead, tracing only  - Orthopedic  Referral    Type 2 diabetes mellitus without complication, without long-term current use of insulin (H)  Chronic issue, patient requested A1C update today. Completed.  - Hemoglobin A1c  - Hemoglobin A1c    Hypercholesteremia  Chronic issue, will recheck lipid, encouraged compliance with Lipitor.  - Lipid panel reflex to direct LDL Fasting  - Lipid panel reflex to direct LDL Fasting            BMI  Estimated body mass index is 29.96 kg/m  as calculated from the following:    Height as of this encounter: 1.791 m (5' 10.5\").    Weight as of this encounter: 96.1 kg (211 lb 12.8 oz).   Weight management plan: Discussed healthy diet and exercise guidelines      Risks, benefits and alternatives were discussed with patient. Agreeable to the plan of care.      Jose Taylor is a 69 year old, presenting for the following health issues:  Shoulder Pain (Left shoulder pain that goes into the back x 2 weeks/No known injury)        11/6/2024     9:54 AM   Additional Questions   Roomed by Olinda CARDONA CMA     Shoulder Pain    History of Present Illness       Back Pain:  She presents for follow up of back pain. Patient's back pain is a new problem.    Original cause of back pain: not sure  First noticed back pain: 1-4 weeks " "ago  Patient feels back pain: dailyLocation of back pain:  Left middle of back and left shoulder  Description of back pain: stabbing  Back pain spreads: left shoulder    Since patient first noticed back pain, pain is: always present, but gets better and worse  Does back pain interfere with her job:  Not applicable  On a scale of 1-10 (10 being the worst), patient describes pain as:  7  What makes back pain worse: bending and certain positions   Acupuncture: not tried  Acetaminophen: helpful  Activity or exercise: not helpful  Chiropractor:  Helpful  Cold: helpful  Heat: helpful  Massage: helpful  Muscle relaxants: not helpful  NSAIDS: helpful  Opioids: not tried  Physical Therapy: not tried  Rest: helpful  Steroid Injection: not tried  Stretching: not helpful  Surgery: not tried  TENS unit: not tried  Topical pain relievers: helpful  Other healthcare providers patient is seeing for back pain: Chiropractor   She is taking medications regularly.           Patient is here today for evaluation of left shoulder pain in the deltoid region and also along the medial scapula  Ongoing for 2 weeks, she endorses daily minimal pain that sometimes becomes sharp  Denies chest pain, shortness of breath, fall or injury  She states she has tried Salon PAs, chiropractic care, Tylenol without relief  Feels like her heart is pounding more than normal with activity  No exertional chest pain  She has been taking BP meds, Eliquis daily, has hx of a fib/flutter, s/p atrial flutter ablation in 2020, follows with cardiology  Notes difficulty with ROM, bending arm behind her      Review of Systems  Constitutional, HEENT, cardiovascular, pulmonary, gi and gu systems are negative, except as otherwise noted.      Objective    BP (!) 136/99 (BP Location: Left arm, Patient Position: Sitting, Cuff Size: Adult Regular)   Pulse 77   Temp 98.4  F (36.9  C) (Oral)   Resp 16   Ht 1.791 m (5' 10.5\")   Wt 96.1 kg (211 lb 12.8 oz)   SpO2 98%   BMI " 29.96 kg/m    Body mass index is 29.96 kg/m .  Physical Exam   GENERAL: alert and no distress  NECK: no adenopathy, no asymmetry, masses, or scars  RESP: lungs clear to auscultation - no rales, rhonchi or wheezes  CV: regular rate and rhythm, normal S1 S2, no S3 or S4, no murmur, click or rub, no peripheral edema  MS: normal ROM to left shoulder except with posterior flexion, + left empty can test, + left push off test    EKG - Reviewed and interpreted by me appears normal, NSR, normal axis, normal intervals, no acute ST/T changes c/w ischemia, Right Bundle Branch Block, unchanged from previous tracings        Signed Electronically by: Siri Jeffrey PA-C

## 2024-11-07 LAB
ATRIAL RATE - MUSE: 75 BPM
DIASTOLIC BLOOD PRESSURE - MUSE: NORMAL MMHG
INTERPRETATION ECG - MUSE: NORMAL
P AXIS - MUSE: 19 DEGREES
PR INTERVAL - MUSE: 174 MS
QRS DURATION - MUSE: 144 MS
QT - MUSE: 436 MS
QTC - MUSE: 486 MS
R AXIS - MUSE: -44 DEGREES
SYSTOLIC BLOOD PRESSURE - MUSE: NORMAL MMHG
T AXIS - MUSE: -1 DEGREES
VENTRICULAR RATE- MUSE: 75 BPM

## 2024-11-14 ENCOUNTER — ANCILLARY PROCEDURE (OUTPATIENT)
Dept: GENERAL RADIOLOGY | Facility: CLINIC | Age: 69
End: 2024-11-14
Attending: PHYSICIAN ASSISTANT
Payer: COMMERCIAL

## 2024-11-14 ENCOUNTER — OFFICE VISIT (OUTPATIENT)
Dept: ORTHOPEDICS | Facility: CLINIC | Age: 69
End: 2024-11-14
Attending: PHYSICIAN ASSISTANT
Payer: COMMERCIAL

## 2024-11-14 VITALS
SYSTOLIC BLOOD PRESSURE: 124 MMHG | WEIGHT: 214.2 LBS | OXYGEN SATURATION: 95 % | HEART RATE: 67 BPM | BODY MASS INDEX: 30.3 KG/M2 | DIASTOLIC BLOOD PRESSURE: 82 MMHG

## 2024-11-14 DIAGNOSIS — Z79.01 CHRONIC ANTICOAGULATION: ICD-10-CM

## 2024-11-14 DIAGNOSIS — I48.0 PAROXYSMAL ATRIAL FIBRILLATION (H): ICD-10-CM

## 2024-11-14 DIAGNOSIS — M75.82 ROTATOR CUFF TENDONITIS, LEFT: Primary | Chronic | ICD-10-CM

## 2024-11-14 DIAGNOSIS — E89.0 POSTOPERATIVE HYPOTHYROIDISM: ICD-10-CM

## 2024-11-14 DIAGNOSIS — M25.512 ACUTE PAIN OF LEFT SHOULDER: ICD-10-CM

## 2024-11-14 DIAGNOSIS — M25.512 PAIN IN JOINT OF LEFT SHOULDER: ICD-10-CM

## 2024-11-14 DIAGNOSIS — E11.9 TYPE 2 DIABETES MELLITUS WITHOUT COMPLICATION, WITHOUT LONG-TERM CURRENT USE OF INSULIN (H): Chronic | ICD-10-CM

## 2024-11-14 PROCEDURE — 72072 X-RAY EXAM THORAC SPINE 3VWS: CPT | Mod: TC | Performed by: STUDENT IN AN ORGANIZED HEALTH CARE EDUCATION/TRAINING PROGRAM

## 2024-11-14 PROCEDURE — 73030 X-RAY EXAM OF SHOULDER: CPT | Mod: TC | Performed by: RADIOLOGY

## 2024-11-14 PROCEDURE — 99204 OFFICE O/P NEW MOD 45 MIN: CPT | Performed by: PHYSICIAN ASSISTANT

## 2024-11-14 ASSESSMENT — PAIN SCALES - GENERAL: PAINLEVEL_OUTOF10: SEVERE PAIN (6)

## 2024-11-14 NOTE — LETTER
11/14/2024      Claudia Colunga  [address]  [place] [address]      Dear Colleague,    Thank you for referring your patient, Claudia Colunga, to the [facility]. Please see a copy of my visit note below.    ASSESSMENT & PLAN     Today we discussed the underlying etiology/pathology of patient's   1. Rotator cuff tendonitis, left    2. Postoperative hypothyroidism    3. Paroxysmal atrial fibrillation (H)    4. Type 2 diabetes mellitus without complication, without long-term current use of insulin (H)    5. Chronic anticoagulation- eliquis (a.fib)      Discussed diagnosis and treatment options with the patient today. A shared decision making model was used. The patient's values and choices were respected. The following represents what was discussed and decided upon by the provider and the patient.   -We discussed the patient standing with left-sided proximal shoulder and scapular discomfort for the last month without known injury other than some repetitive gardening.  This is her dominant arm.  - We reviewed her x-rays of her thoracic spine as well as left shoulder today which shows multilevel degenerative disc disease with increased kyphotic curvature of the thorax with chronic underlying degenerative scoliotic curvature throughout the thoracic lumbar column but no evidence of acute compression fracture which would explain patient's left scapular pain  - X-rays of her shoulder are largely unremarkable without evidence of fracture, dislocation or high-grade arthritis  - We discussed her physical exam is consistent with mild rotator cuff tendinosis/irritation with no pain to palpation around the scapula body itself or testing of the cervical or thoracic spine.  She has adequate rotator cuff muscle tone but pain with impingement testing and empty can positioning of the supraspinatus muscle without weakness  - We discussed patient's past medical history including  cardiac history and chronic anticoagulation.  We agreed to proceed conservatively with referral to physical therapy for left shoulder rotator cuff and scapular stabilization program.  Patient should attend at least 4-6 sessions of physical therapy to determine response before reengagement.  - We discussed that with her chronic anticoagulation for A-fib with Eliquis she needs to avoid traditional NSAIDs.  We did discuss options for subacromial corticosteroid injection if she does not respond to formal physical therapy.  Patient will return in approximately 2-3 months after completion of therapy if shoulder continues to bother her we will consider injection  - Patient may continue with chiropractic care, oral Tylenol, massage and topical muscle rubs for symptomatic management  - If patient fails to improve with future corticosteroid injection if warranted then we would need to proceed with MRI scan of the shoulder.    -Call direct clinic number [[phone]] at any time with questions or concerns in regards to your recent office visit with me.     Vick Donato PA-C  [facility]    This note was completed in part using a voice recognition software, any grammatical or context distortion are unintentional and inherent to the software.           SUBJECTIVE  Claudia Colunga is a/an 69 year old female who is seen in consultation at the request of  Siri Jeffrey PA-C for evaluation of left shoulder and scapular pain. The patient is seen by themselves.    Onset: 1 month(s) ago. Reports insidious onset without acute precipitating event. Pulling some grape samra.  Patient is left-hand dominant.  Patient has a history of cardiac issues and cardiac conditions have been ruled out as a contributor to her left shoulder discomfort  Location of Pain: left proximal shoulder girdle as well as posterior scapular region  Rating of Pain at worst: 8/10  Rating of Pain Currently: 5/10  Worsened by:  Certain motions of the arm such as reaching behind her back  Better with: sitting or applying pressure points around the scapula  Treatments tried: rest/activity avoidance, elevation, ice, and Tylenol  Quality: radiating  Associated symptoms: no distal numbness or tingling; denies swelling or warmth  Orthopedic history: YES - Date: 14 years age right knee  Relevant surgical history: NO  Social history: social history: retired    Past Medical History:   Diagnosis Date     Anxiety      Atrial flutter (H)      Diabetes mellitus, type II (H)      Eosinophilic esophagitis      Essential hypertension      Hypothyroid      Overflow incontinence      Typical atrial flutter (H) 6/23/2020 8/25/2020 right CTI flutter ablation and developed atrial fib post  ablation      Social History     Socioeconomic History     Marital status: Single     Number of children: 0   Tobacco Use     Smoking status: Never     Passive exposure: Never     Smokeless tobacco: Never   Vaping Use     Vaping status: Never Used   Substance and Sexual Activity     Alcohol use: Yes     Comment: Alcoholic Drinks/day: rare, only with dinner out with friends     Drug use: No   Social History Narrative    She walks her poodle daily for 30-60 minutes. She enjoys going to the [employer] or [employer] to paddle in the pool.     Social Drivers of Health     Financial Resource Strain: Low Risk  (4/15/2024)    Financial Resource Strain      Within the past 12 months, have you or your family members you live with been unable to get utilities (heat, electricity) when it was really needed?: No   Food Insecurity: Low Risk  (4/15/2024)    Food Insecurity      Within the past 12 months, did you worry that your food would run out before you got money to buy more?: No      Within the past 12 months, did the food you bought just not last and you didn t have money to get more?: No   Transportation Needs: Low Risk  (4/15/2024)    Transportation Needs      Within the  past 12 months, has lack of transportation kept you from medical appointments, getting your medicines, non-medical meetings or appointments, work, or from getting things that you need?: No   Physical Activity: Unknown (4/15/2024)    Exercise Vital Sign      Days of Exercise per Week: 5 days   Stress: No Stress Concern Present (4/15/2024)    [employer] - Occupational Stress Questionnaire      Feeling of Stress : Only a little   Social Connections: Unknown (4/15/2024)    Social Connection and Isolation Panel [NHANES]      Frequency of Social Gatherings with Friends and Family: More than three times a week   Interpersonal Safety: Low Risk  (11/6/2024)    Interpersonal Safety      Do you feel physically and emotionally safe where you currently live?: Yes      Within the past 12 months, have you been hit, slapped, kicked or otherwise physically hurt by someone?: No      Within the past 12 months, have you been humiliated or emotionally abused in other ways by your partner or ex-partner?: No   Housing Stability: Low Risk  (4/15/2024)    Housing Stability      Do you have housing? : Yes      Are you worried about losing your housing?: No         Patient's past medical, surgical, social, and family histories were personally reviewed today and no changes are noted.    REVIEW OF SYSTEMS:  10 point ROS is negative other than symptoms noted above in HPI, Past Medical History or as stated below  Constitutional: NEGATIVE for fever, chills, change in weight  Skin: NEGATIVE for worrisome rashes, moles or lesions  GI/: NEGATIVE for bowel or bladder changes  Neuro: NEGATIVE for weakness, dizziness or paresthesias    OBJECTIVE:  Vital signs as noted in EPIC for 11/12/2024  General: healthy, alert and in no distress  HEENT: no scleral icterus or conjunctival erythema  Skin: no suspicious lesions or rash. No jaundice.  CV: no pedal edema  Resp: normal respiratory effort without conversational dyspnea    Psych: normal mood and affect  Neuro: Normal light sensory exam of lower extremity      MSK:  Exam shows a very pleasant 69-year-old female who ambulates full weightbearing without assistive device.  She is alert and oriented x 3.  She has acceptable symmetric range of motion of both shoulders lacking only 5 degrees of terminal flexion on the left shoulder compared to the right.  Abduction is symmetric above 90 degrees internal rotation is symmetric behind the back.  She has positive impingement testing on the left shoulder as well as discomfort with empty can testing of the supraspinatus but no breakthrough weakness.  Remaining muscle tone of the rotator cuff is asymptomatic with good strength.  No pain at the sternum, clavicle or AC joints.  Patient has previous surgical incision over the thyroid from previous surgery which is well-healed.  No pain to palpation over the deltoid, anterior subacromial space or proximal bicipital groove.  Patient has full range of motion of the C-spine without reproduction of symptoms.  She has no pain to palpation throughout the cervical spine and thoracic spine including paraspinous muscles.  Patient has slight increased thoracic kyphosis on exam.  Patient voices that she normally feels pain along the medial border of the left scapula but there is no pain to palpation along the scapular stabilizers including the rhomboids and elevators.  Shoulder shrug is 5 out of 5 without discomfort.  Patient can bring her shoulder blades together without discomfort.  No abbey scapular winging.            Personal Independent visualization of the below images done today:  Three-view x-ray of the patient's left shoulder are obtained and reviewed today.  No evidence of fracture or dislocation.  No high-grade arthritis.  Three-view x-ray the patient's thorax are obtained and reviewed today.  Patient shows multilevel degenerative disc disease throughout the thoracic spine with mild thoracic lumbar  scoliotic curvature.  No evidence of rib fractures or compression fracture of the spine.  Increased kyphotic curvature is noted.    Patient's conditions were thoroughly discussed during today's visit with total time reviewing patient's previous medical records/history/radiology, face-to-face examination and discussion and plan of care with the patient and documentation being 45 minutes for today's clinical visit  Vick Donato PA-C  [facility]    This note was completed in part using a voice recognition software, any grammatical or context distortion are unintentional and inherent to the software.        Again, thank you for allowing me to participate in the care of your patient.        Sincerely,        Vick Donato PA-C

## 2024-11-14 NOTE — PATIENT INSTRUCTIONS
Today we discussed the underlying etiology/pathology of patient's   1. Rotator cuff tendonitis, left    2. Postoperative hypothyroidism    3. Paroxysmal atrial fibrillation (H)    4. Type 2 diabetes mellitus without complication, without long-term current use of insulin (H)    5. Chronic anticoagulation- eliquis (a.fib)      Discussed diagnosis and treatment options with the patient today. A shared decision making model was used. The patient's values and choices were respected. The following represents what was discussed and decided upon by the provider and the patient.   -We discussed the patient standing with left-sided proximal shoulder and scapular discomfort for the last month without known injury other than some repetitive gardening.  This is her dominant arm.  - We reviewed her x-rays of her thoracic spine as well as left shoulder today which shows multilevel degenerative disc disease with increased kyphotic curvature of the thorax with chronic underlying degenerative scoliotic curvature throughout the thoracic lumbar column but no evidence of acute compression fracture which would explain patient's left scapular pain  - X-rays of her shoulder are largely unremarkable without evidence of fracture, dislocation or high-grade arthritis  - We discussed her physical exam is consistent with mild rotator cuff tendinosis/irritation with no pain to palpation around the scapula body itself or testing of the cervical or thoracic spine.  She has adequate rotator cuff muscle tone but pain with impingement testing and empty can positioning of the supraspinatus muscle without weakness  - We discussed patient's past medical history including cardiac history and chronic anticoagulation.  We agreed to proceed conservatively with referral to physical therapy for left shoulder rotator cuff and scapular stabilization program.  Patient should attend at least 4-6 sessions of physical therapy to determine response before  reengagement.  - We discussed that with her chronic anticoagulation for A-fib with Eliquis she needs to avoid traditional NSAIDs.  We did discuss options for subacromial corticosteroid injection if she does not respond to formal physical therapy.  Patient will return in approximately 2-3 months after completion of therapy if shoulder continues to bother her we will consider injection  - Patient may continue with chiropractic care, oral Tylenol, massage and topical muscle rubs for symptomatic management  - If patient fails to improve with future corticosteroid injection if warranted then we would need to proceed with MRI scan of the shoulder.    -Call direct clinic number [[phone]] at any time with questions or concerns in regards to your recent office visit with me.     Vick Donato PA-C  [facility]    This note was completed in part using a voice recognition software, any grammatical or context distortion are unintentional and inherent to the software.

## 2024-11-19 ENCOUNTER — THERAPY VISIT (OUTPATIENT)
Dept: PHYSICAL THERAPY | Facility: REHABILITATION | Age: 69
End: 2024-11-19
Attending: PHYSICIAN ASSISTANT
Payer: COMMERCIAL

## 2024-11-19 DIAGNOSIS — M75.82 ROTATOR CUFF TENDONITIS, LEFT: Chronic | ICD-10-CM

## 2024-11-19 ASSESSMENT — ACTIVITIES OF DAILY LIVING (ADL)
REACHING_FOR_SOMETHING_ON_A_HIGH_SHELF: 5
PUSHING_WITH_THE_INVOLVED_ARM: 5
WHEN_LYING_ON_THE_INVOLVED_SIDE: 5
AT_ITS_WORST?: 6
PLEASE_INDICATE_YOR_PRIMARY_REASON_FOR_REFERRAL_TO_THERAPY:: SHOULDER
TOUCHING_THE_BACK_OF_YOUR_NECK: 0
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
## 2024-11-24 ENCOUNTER — TRANSFERRED RECORDS (OUTPATIENT)
Dept: MULTI SPECIALTY CLINIC | Facility: CLINIC | Age: 69
End: 2024-11-24

## 2024-11-24 LAB — RETINOPATHY: NORMAL

## 2025-02-24 DIAGNOSIS — E03.9 HYPOTHYROIDISM: ICD-10-CM

## 2025-02-24 RX ORDER — LEVOTHYROXINE SODIUM 75 UG/1
TABLET ORAL
Qty: 90 TABLET | Refills: 2 | OUTPATIENT
Start: 2025-02-24

## 2025-02-25 DIAGNOSIS — I48.3 TYPICAL ATRIAL FLUTTER (H): ICD-10-CM

## 2025-02-25 RX ORDER — APIXABAN 5 MG/1
TABLET, FILM COATED ORAL
Qty: 180 TABLET | Refills: 0 | Status: SHIPPED | OUTPATIENT
Start: 2025-02-25

## 2025-02-25 RX ORDER — METOPROLOL SUCCINATE 50 MG/1
50 TABLET, EXTENDED RELEASE ORAL 2 TIMES DAILY
Qty: 180 TABLET | Refills: 0 | Status: SHIPPED | OUTPATIENT
Start: 2025-02-25

## 2025-02-25 NOTE — TELEPHONE ENCOUNTER
Patient called regarding medication requests. Medications filled per RN protocol.    Gumaro Sanford RN

## 2025-03-24 DIAGNOSIS — E03.9 HYPOTHYROIDISM: ICD-10-CM

## 2025-03-24 RX ORDER — LEVOTHYROXINE SODIUM 75 UG/1
TABLET ORAL
Qty: 90 TABLET | Refills: 0 | Status: SHIPPED | OUTPATIENT
Start: 2025-03-24

## 2025-04-21 ENCOUNTER — OFFICE VISIT (OUTPATIENT)
Dept: FAMILY MEDICINE | Facility: CLINIC | Age: 70
End: 2025-04-21
Payer: COMMERCIAL

## 2025-04-21 VITALS
OXYGEN SATURATION: 95 % | SYSTOLIC BLOOD PRESSURE: 114 MMHG | DIASTOLIC BLOOD PRESSURE: 75 MMHG | HEART RATE: 72 BPM | RESPIRATION RATE: 16 BRPM | HEIGHT: 71 IN | TEMPERATURE: 97.8 F | WEIGHT: 213.4 LBS | BODY MASS INDEX: 29.88 KG/M2

## 2025-04-21 DIAGNOSIS — Z00.00 ENCOUNTER FOR MEDICARE ANNUAL WELLNESS EXAM: Primary | ICD-10-CM

## 2025-04-21 DIAGNOSIS — Z12.31 ENCOUNTER FOR SCREENING MAMMOGRAM FOR BREAST CANCER: ICD-10-CM

## 2025-04-21 DIAGNOSIS — H61.22 IMPACTED CERUMEN OF LEFT EAR: ICD-10-CM

## 2025-04-21 DIAGNOSIS — H91.93 DECREASED HEARING OF BOTH EARS: ICD-10-CM

## 2025-04-21 DIAGNOSIS — E06.3 HYPOTHYROIDISM DUE TO HASHIMOTO THYROIDITIS: ICD-10-CM

## 2025-04-21 DIAGNOSIS — Z79.01 CHRONIC ANTICOAGULATION: ICD-10-CM

## 2025-04-21 DIAGNOSIS — K20.0 EOSINOPHILIC ESOPHAGITIS: Chronic | ICD-10-CM

## 2025-04-21 DIAGNOSIS — I48.0 PAROXYSMAL ATRIAL FIBRILLATION (H): ICD-10-CM

## 2025-04-21 DIAGNOSIS — E11.9 TYPE 2 DIABETES MELLITUS WITHOUT COMPLICATION, WITHOUT LONG-TERM CURRENT USE OF INSULIN (H): ICD-10-CM

## 2025-04-21 DIAGNOSIS — I10 ESSENTIAL HYPERTENSION: ICD-10-CM

## 2025-04-21 LAB
CREAT UR-MCNC: 242 MG/DL
ERYTHROCYTE [DISTWIDTH] IN BLOOD BY AUTOMATED COUNT: 12.2 % (ref 10–15)
EST. AVERAGE GLUCOSE BLD GHB EST-MCNC: 186 MG/DL
HBA1C MFR BLD: 8.1 % (ref 0–5.6)
HCT VFR BLD AUTO: 44.3 % (ref 35–47)
HGB BLD-MCNC: 14.5 G/DL (ref 11.7–15.7)
MCH RBC QN AUTO: 31.3 PG (ref 26.5–33)
MCHC RBC AUTO-ENTMCNC: 32.7 G/DL (ref 31.5–36.5)
MCV RBC AUTO: 96 FL (ref 78–100)
MICROALBUMIN UR-MCNC: 14.2 MG/L
MICROALBUMIN/CREAT UR: 5.87 MG/G CR (ref 0–25)
PLATELET # BLD AUTO: 291 10E3/UL (ref 150–450)
RBC # BLD AUTO: 4.64 10E6/UL (ref 3.8–5.2)
WBC # BLD AUTO: 6.4 10E3/UL (ref 4–11)

## 2025-04-21 PROCEDURE — G2211 COMPLEX E/M VISIT ADD ON: HCPCS | Performed by: FAMILY MEDICINE

## 2025-04-21 PROCEDURE — 82043 UR ALBUMIN QUANTITATIVE: CPT | Performed by: FAMILY MEDICINE

## 2025-04-21 PROCEDURE — 84443 ASSAY THYROID STIM HORMONE: CPT | Performed by: FAMILY MEDICINE

## 2025-04-21 PROCEDURE — G0439 PPPS, SUBSEQ VISIT: HCPCS | Performed by: FAMILY MEDICINE

## 2025-04-21 PROCEDURE — 80061 LIPID PANEL: CPT | Performed by: FAMILY MEDICINE

## 2025-04-21 PROCEDURE — 80053 COMPREHEN METABOLIC PANEL: CPT | Performed by: FAMILY MEDICINE

## 2025-04-21 PROCEDURE — 36415 COLL VENOUS BLD VENIPUNCTURE: CPT | Performed by: FAMILY MEDICINE

## 2025-04-21 PROCEDURE — 83036 HEMOGLOBIN GLYCOSYLATED A1C: CPT | Performed by: FAMILY MEDICINE

## 2025-04-21 PROCEDURE — 85027 COMPLETE CBC AUTOMATED: CPT | Performed by: FAMILY MEDICINE

## 2025-04-21 PROCEDURE — 3074F SYST BP LT 130 MM HG: CPT | Performed by: FAMILY MEDICINE

## 2025-04-21 PROCEDURE — 3078F DIAST BP <80 MM HG: CPT | Performed by: FAMILY MEDICINE

## 2025-04-21 PROCEDURE — 99214 OFFICE O/P EST MOD 30 MIN: CPT | Mod: 25 | Performed by: FAMILY MEDICINE

## 2025-04-21 PROCEDURE — 82570 ASSAY OF URINE CREATININE: CPT | Performed by: FAMILY MEDICINE

## 2025-04-21 SDOH — HEALTH STABILITY: PHYSICAL HEALTH: ON AVERAGE, HOW MANY DAYS PER WEEK DO YOU ENGAGE IN MODERATE TO STRENUOUS EXERCISE (LIKE A BRISK WALK)?: 5 DAYS

## 2025-04-21 ASSESSMENT — SOCIAL DETERMINANTS OF HEALTH (SDOH): HOW OFTEN DO YOU GET TOGETHER WITH FRIENDS OR RELATIVES?: MORE THAN THREE TIMES A WEEK

## 2025-04-21 NOTE — PROGRESS NOTES
Preventive Care Visit  [facility]  Pamella Abraham MD, Family Medicine  Apr 21, 2025      1. Encounter for Medicare annual wellness exam (Primary)  Claudia comes in today for her annual wellness exam.  As far as healthcare maintenance, she is due for mammogram in May.  She had a normal bone density in 2021 so was due in 2026.  Her next colonoscopy is due in 2029.  I did remind her again to get her tetanus booster at the pharmacy.    2. Type 2 diabetes mellitus without complication, without long-term current use of insulin (H)  She is also here to follow-up on her type 2 diabetes.  She is currently not on any medication.  Unfortunately her A1c is up to 8.1.  We did discuss starting metformin but she would like to work on diet and exercise first.  I would recommend rechecking in 3 months instead of waiting 6 months.  She is on a statin and she is up-to-date on eye exam.  Foot exam is normal today.  - Albumin Random Urine Quantitative with Creat Ratio; Future  - Lipid panel reflex to direct LDL Fasting; Future  - CBC with platelets; Future  - Comprehensive metabolic panel; Future    3. Essential hypertension  Blood pressures under good control with just the metoprolol.    4. Hypothyroidism due to Hashimoto thyroiditis  She has been fairly stable on her current dose of Synthroid.  Will check her TSH today.  - TSH WITH FREE T4 REFLEX; Future    5. Chronic anticoagulation- eliquis (a.fib)  She continues on the Eliquis for paroxysmal atrial fibrillation.  She states she had maybe 5 episodes of the last year that lasted about an hour.    6. Eosinophilic esophagitis  This has been stable.  She currently does not take any medication.  Her only symptom she sometimes get is where she feels like she just has a lot of phlegm in her throat where she has to clear her throat.  She is wondering if she needs an EGD.  I would defer that to [facility] but she is not having pain or difficulty  swallowing.    7. Paroxysmal atrial fibrillation (H)  Stable with Eliquis and metoprolol.  About 5 episodes lasting less than 1 hour in the last year.    8. Encounter for screening mammogram for breast cancer    - MA Screen Bilateral w/Jorge Luis; Future    9. Impacted cerumen of left ear  She did have some resolution in the clinic today but still feels like she should get her hearing checked.    10. Decreased hearing of both ears    - Adult Audiology  Referral; Future  Appropriate preventive services were addressed with this patient via screening, questionnaire, or discussion as appropriate for fall prevention, nutrition, physical activity, Tobacco-use cessation, social engagement, weight loss and cognition.  Checklist reviewing preventive services available has been given to the patient.  Reviewed patient's diet, addressing concerns and/or questions.   The patient was instructed to see the dentist every 6 months.   The patient was provided with written information regarding signs of hearing loss.   I have reviewed Opioid Use Disorder and Substance Use Disorder risk factors and made any needed referrals.      Jose Taylor is a 69 year old, presenting for the following:  Physical (Fasting. Check ears. Mammo order. Joint pain - shoulder and right knee - discuss supplements.  Issues with phlegm. )        4/21/2025     8:29 AM   Additional Questions   Roomed by      Via the Health Maintenance questionnaire, the patient has reported the following services have been completed -Eye Exam: [facility] 2024-11-24, this information has been sent to the abstraction team.    HPI  She is in today for her annual wellness.  She wanted me to check her ears as they do feel kind of plugged and she feels like she cannot hear very well.  She is also checking in on her type 2 diabetes.  She admits she has not been very good with diet and exercise.  She really does not want to start medication and states that she would  work harder with this.  She is up-to-date on eye exam.  She has some supplements that she shows me for joint health.  I discussed with her with Eliquis she needs to be careful and I would not have her take any herbal supplements.  The omega-3's and the conjoint are probably fine.    The longitudinal plan of care for the diagnosis(es)/condition(s) as documented were addressed during this visit. Due to the added complexity in care, I will continue to support Claudia in the subsequent management and with ongoing continuity of care.         Advance Care Planning    Discussed advance care planning with patient; informed AVS has link to [facility].        4/21/2025   General Health   How would you rate your overall physical health? Good   Feel stress (tense, anxious, or unable to sleep) Only a little   (!) STRESS CONCERN      4/21/2025   Nutrition   Diet: Regular (no restrictions)         4/21/2025   Exercise   Days per week of moderate/strenous exercise 5 days         4/21/2025   Social Factors   Frequency of gathering with friends or relatives More than three times a week   Worry food won't last until get money to buy more No   Food not last or not have enough money for food? No   Do you have housing? (Housing is defined as stable permanent housing and does not include staying ouside in a car, in a tent, in an abandoned building, in an overnight shelter, or couch-surfing.) Yes   Are you worried about losing your housing? No   Lack of transportation? No   Unable to get utilities (heat,electricity)? No         4/21/2025   Fall Risk   Fallen 2 or more times in the past year? No   Trouble with walking or balance? No          4/21/2025   Activities of Daily Living- Home Safety   Needs help with the following daily activites None of the above   Safety concerns in the home None of the above         4/21/2025   Dental   Dentist two times every year? Yes         4/21/2025   Hearing Screening   Hearing concerns? (!) IT'S HARD  TO FOLLOW A CONVERSATION IN A NOISY RESTAURANT OR CROWDED ROOM.         4/21/2025   Driving Risk Screening   Patient/family members have concerns about driving No         4/21/2025   General Alertness/Fatigue Screening   Have you been more tired than usual lately? No         4/21/2025   Urinary Incontinence Screening   Bothered by leaking urine in past 6 months Yes         Today's PHQ-2 Score:       4/21/2025     8:30 AM   PHQ-2 ( 1999 [employer])   Q1: Little interest or pleasure in doing things 0   Q2: Feeling down, depressed or hopeless 0   PHQ-2 Score 0    Q1: Little interest or pleasure in doing things Not at all   Q2: Feeling down, depressed or hopeless Not at all   PHQ-2 Score 0       Patient-reported           4/21/2025   Substance Use   Alcohol more than 3/day or more than 7/wk Not Applicable   Do you have a current opioid prescription? No   How severe/bad is pain from 1 to 10? 2/10   Do you use any other substances recreationally? No     Social History     Tobacco Use    Smoking status: Never     Passive exposure: Never    Smokeless tobacco: Never   Vaping Use    Vaping status: Never Used   Substance Use Topics    Alcohol use: Yes     Comment: Alcoholic Drinks/day: rare, only with dinner out with friends    Drug use: No           5/8/2024   LAST FHS-7 RESULTS   1st degree relative breast or ovarian cancer No   Any relative bilateral breast cancer No   Any male have breast cancer No   Any ONE woman have BOTH breast AND ovarian cancer No   Any woman with breast cancer before 50yrs No   2 or more relatives with breast AND/OR ovarian cancer No   2 or more relatives with breast AND/OR bowel cancer No        Mammogram Screening - Mammogram every 1-2 years updated in Health Maintenance based on mutual decision making      History of abnormal Pap smear: no       ASCVD Risk   The ASCVD Risk score (Mehran HUFF, et al., 2019) failed to calculate for the following reasons:    The valid total cholesterol range is  130 to 320 mg/dL            Reviewed and updated as needed this visit by Provider                    Lab work is in process  Current Outpatient Medications   Medication Sig Dispense Refill    apixaban ANTICOAGULANT (ELIQUIS ANTICOAGULANT) 5 MG tablet TAKE 1 TABLET BY MOUTH TWICE A  tablet 0    atorvastatin (LIPITOR) 20 MG tablet Take 1 tablet (20 mg) by mouth daily (Patient taking differently: Take 10 mg by mouth daily.) 90 tablet 3    levothyroxine (SYNTHROID/LEVOTHROID) 75 MCG tablet TAKE 1 TABLET BY MOUTH ONCE DAILY AT 6AM. 90 tablet 0    metoprolol succinate ER (TOPROL XL) 50 MG 24 hr tablet TAKE 1 TABLET BY MOUTH TWICE A  tablet 0    multivitamin capsule [MULTIVITAMIN CAPSULE] Take 1 capsule by mouth daily.       Current providers sharing in care for this patient include:  Patient Care Team:  Pamella Abraham MD as PCP - General (Family Medicine)  Deshawn Durham, PharmD as Pharmacist (Pharmacist)  Barb Paulino MD as MD (Cardiovascular Disease)  Pamella Abraham MD as Assigned PCP  Gladys [facility] as Pharmacist  Barb Paulino MD as Assigned Heart and Vascular Provider  Vick Donato PA-C as Assigned Musculoskeletal Provider    The following health maintenance items are reviewed in Epic and correct as of today:  Health Maintenance   Topic Date Due    RSV VACCINE (1 - Risk 60-74 years 1-dose series) Never done    ZOSTER IMMUNIZATION (2 of 2) 05/27/2015    ANNUAL REVIEW OF HM ORDERS  02/01/2024    DTAP/TDAP/TD IMMUNIZATION (2 - Td or Tdap) 03/12/2024    INFLUENZA VACCINE (1) 09/01/2024    EYE EXAM  11/08/2024    BMP  04/15/2025    MICROALBUMIN  04/15/2025    TSH W/FREE T4 REFLEX  04/15/2025    COVID-19 Vaccine (6 - 2024-25 season) 05/06/2025    A1C  10/21/2025    LIPID  11/06/2025    MEDICARE ANNUAL WELLNESS VISIT  04/21/2026    DIABETIC FOOT EXAM  04/21/2026    FALL RISK ASSESSMENT  04/21/2026    MAMMO SCREENING  05/08/2026    COLORECTAL CANCER SCREENING  05/22/2029    ADVANCE CARE  "PLANNING  04/21/2030    DEXA  04/29/2036    HEPATITIS C SCREENING  Completed    PHQ-2 (once per calendar year)  Completed    Pneumococcal Vaccine: 50+ Years  Completed    HPV IMMUNIZATION  Aged Out    MENINGITIS IMMUNIZATION  Aged Out         Review of Systems  Constitutional, HEENT, cardiovascular, pulmonary, gi and gu systems are negative, except as otherwise noted.     Objective    Exam  /75   Pulse 72   Temp 97.8  F (36.6  C)   Resp 16   Ht 1.803 m (5' 11\")   Wt 96.8 kg (213 lb 6.4 oz)   SpO2 95%   BMI 29.76 kg/m     Estimated body mass index is 29.76 kg/m  as calculated from the following:    Height as of this encounter: 1.803 m (5' 11\").    Weight as of this encounter: 96.8 kg (213 lb 6.4 oz).    Physical Exam  GENERAL: alert and no distress  EYES: Eyes grossly normal to inspection, PERRL and conjunctivae and sclerae normal  HENT: ear canals and TM's normal, nose and mouth without ulcers or lesions  NECK: no adenopathy, no asymmetry, masses, or scars  RESP: lungs clear to auscultation - no rales, rhonchi or wheezes  CV: regular rate and rhythm, normal S1 S2, no S3 or S4, no murmur, click or rub, no peripheral edema  ABDOMEN: soft, nontender, no hepatosplenomegaly, no masses and bowel sounds normal  MS: no gross musculoskeletal defects noted, no edema  SKIN: no suspicious lesions or rashes  NEURO: Normal strength and tone, mentation intact and speech normal  PSYCH: mentation appears normal, affect normal/bright  Diabetic foot exam: normal DP and PT pulses, no trophic changes or ulcerative lesions, and normal sensory exam         4/21/2025   Mini Cog   Clock Draw Score 2 Normal   3 Item Recall 3 objects recalled   Mini Cog Total Score 5              Signed Electronically by: Pamella Abraham MD    "

## 2025-04-21 NOTE — PATIENT INSTRUCTIONS
Patient Education   Preventive Care Advice   This is general advice given by our system to help you stay healthy. However, your care team may have specific advice just for you. Please talk to your care team about your preventive care needs.  Nutrition  Eat 5 or more servings of fruits and vegetables each day.  Try wheat bread, brown rice and whole grain pasta (instead of white bread, rice, and pasta).  Get enough calcium and vitamin D. Check the label on foods and aim for 100% of the RDA (recommended daily allowance).  Lifestyle  Exercise at least 150 minutes each week  (30 minutes a day, 5 days a week).  Do muscle strengthening activities 2 days a week. These help control your weight and prevent disease.  No smoking.  Wear sunscreen to prevent skin cancer.  Have a dental exam and cleaning every 6 months.  Yearly exams  See your health care team every year to talk about:  Any changes in your health.  Any medicines your care team has prescribed.  Preventive care, family planning, and ways to prevent chronic diseases.  Shots (vaccines)   HPV shots (up to age 26), if you've never had them before.  Hepatitis B shots (up to age 59), if you've never had them before.  COVID-19 shot: Get this shot when it's due.  Flu shot: Get a flu shot every year.  Tetanus shot: Get a tetanus shot every 10 years.  Pneumococcal, hepatitis A, and RSV shots: Ask your care team if you need these based on your risk.  Shingles shot (for age 50 and up)  General health tests  Diabetes screening:  Starting at age 35, Get screened for diabetes at least every 3 years.  If you are younger than age 35, ask your care team if you should be screened for diabetes.  Cholesterol test: At age 39, start having a cholesterol test every 5 years, or more often if advised.  Bone density scan (DEXA): At age 50, ask your care team if you should have this scan for osteoporosis (brittle bones).  Hepatitis C: Get tested at least once in your life.  STIs (sexually  transmitted infections)  Before age 24: Ask your care team if you should be screened for STIs.  After age 24: Get screened for STIs if you're at risk. You are at risk for STIs (including HIV) if:  You are sexually active with more than one person.  You don't use condoms every time.  You or a partner was diagnosed with a sexually transmitted infection.  If you are at risk for HIV, ask about PrEP medicine to prevent HIV.  Get tested for HIV at least once in your life, whether you are at risk for HIV or not.  Cancer screening tests  Cervical cancer screening: If you have a cervix, begin getting regular cervical cancer screening tests starting at age 21.  Breast cancer scan (mammogram): If you've ever had breasts, begin having regular mammograms starting at age 40. This is a scan to check for breast cancer.  Colon cancer screening: It is important to start screening for colon cancer at age 45.  Have a colonoscopy test every 10 years (or more often if you're at risk) Or, ask your provider about stool tests like a FIT test every year or Cologuard test every 3 years.  To learn more about your testing options, visit:   .  For help making a decision, visit:   https://bit.ly/nj82894.  Prostate cancer screening test: If you have a prostate, ask your care team if a prostate cancer screening test (PSA) at age 55 is right for you.  Lung cancer screening: If you are a current or former smoker ages 50 to 80, ask your care team if ongoing lung cancer screenings are right for you.  For informational purposes only. Not to replace the advice of your health care provider. Copyright   2023 [facility] [employer]. All rights reserved. Clinically reviewed by the [facility] Transitions Program. Testin 618218 - REV 01/24.  Hearing Loss: Care Instructions  Overview     Hearing loss is a sudden or slow decrease in how well you hear. It can range from slight to profound. Permanent hearing loss can occur with aging. It also can  happen when you are exposed long-term to loud noise. Examples include listening to loud music, riding motorcycles, or being around other loud machines.  Hearing loss can affect your work and home life. It can make you feel lonely or depressed. You may feel that you have lost your independence. But hearing aids and other devices can help you hear better and feel connected to others.  Follow-up care is a key part of your treatment and safety. Be sure to make and go to all appointments, and call your doctor if you are having problems. It's also a good idea to know your test results and keep a list of the medicines you take.  How can you care for yourself at home?  Avoid loud noises whenever possible. This helps keep your hearing from getting worse.  Always wear hearing protection around loud noises.  Wear a hearing aid as directed.  A professional can help you pick a hearing aid that will work best for you.  You can also get hearing aids over the counter for mild to moderate hearing loss.  Have hearing tests as your doctor suggests. They can show whether your hearing has changed. Your hearing aid may need to be adjusted.  Use other devices as needed. These may include:  Telephone amplifiers and hearing aids that can connect to a television, stereo, radio, or microphone.  Devices that use lights or vibrations. These alert you to the doorbell, a ringing telephone, or a baby monitor.  Television closed-captioning. This shows the words at the bottom of the screen. Most new TVs can do this.  TTY (text telephone). This lets you type messages back and forth on the telephone instead of talking or listening. These devices are also called TDD. When messages are typed on the keyboard, they are sent over the phone line to a receiving TTY. The message is shown on a monitor.  Use text messaging, social media, and email if it is hard for you to communicate by telephone.  Try to learn a listening technique called speechreading. It is  "not lipreading. You pay attention to people's gestures, expressions, posture, and tone of voice. These clues can help you understand what a person is saying. Face the person you are talking to, and have them face you. Make sure the lighting is good. You need to see the other person's face clearly.  Think about counseling if you need help to adjust to your hearing loss.  When should you call for help?  Watch closely for changes in your health, and be sure to contact your doctor if:    You think your hearing is getting worse.     You have new symptoms, such as dizziness or nausea.   Where can you learn more?  Go to https://www.[employer].net/patiented  Enter R798 in the search box to learn more about \"Hearing Loss: Care Instructions.\"  Current as of: October 27, 2024  Content Version: 14.4    2684-1048 [employer].   Care instructions adapted under license by your healthcare professional. If you have questions about a medical condition or this instruction, always ask your healthcare professional. [employer] disclaims any warranty or liability for your use of this information.    Bladder Training: Care Instructions  Your Care Instructions     Bladder training is used to treat urge incontinence and stress incontinence. Urge incontinence means that the need to urinate comes on so fast that you can't get to a toilet in time. Stress incontinence means that you leak urine because of pressure on your bladder. For example, it may happen when you laugh, cough, or lift something heavy.  Bladder training can increase how long you can wait before you have to urinate. It can also help your bladder hold more urine. And it can give you better control over the urge to urinate.  It is important to remember that bladder training takes a few weeks to a few months to make a difference. You may not see results right away, but don't give up.  Follow-up care is a key part of your treatment and safety. Be sure to make " and go to all appointments, and call your doctor if you are having problems. It's also a good idea to know your test results and keep a list of the medicines you take.  How can you care for yourself at home?  Work with your doctor to come up with a bladder training program that is right for you. You may use one or more of the following methods.  Delayed urination  In the beginning, try to keep from urinating for 5 minutes after you first feel the need to go.  While you wait, take deep, slow breaths to relax. Kegel exercises can also help you delay the need to go to the bathroom.  After some practice, when you can easily wait 5 minutes to urinate, try to wait 10 minutes before you urinate.  Slowly increase the waiting period until you are able to control when you have to urinate.  Scheduled urination  Empty your bladder when you first wake up in the morning.  Schedule times throughout the day when you will urinate.  Start by going to the bathroom every hour, even if you don't need to go.  Slowly increase the time between trips to the bathroom.  When you have found a schedule that works well for you, keep doing it.  If you wake up during the night and have to urinate, do it. Apply your schedule to waking hours only.  Kegel exercises  These tighten and strengthen pelvic muscles, which can help you control the flow of urine. (If doing these exercises causes pain, stop doing them and talk with your doctor.) To do Kegel exercises:  Squeeze your muscles as if you were trying not to pass gas. Or squeeze your muscles as if you were stopping the flow of urine. Your belly, legs, and buttocks shouldn't move.  Hold the squeeze for 3 seconds, then relax for 5 to 10 seconds.  Start with 3 seconds, then add 1 second each week until you are able to squeeze for 10 seconds.  Repeat the exercise 10 times a session. Do 3 to 8 sessions a day.  When should you call for help?  Watch closely for changes in your health, and be sure to  "contact your doctor if:    Your incontinence is getting worse.     You do not get better as expected.   Where can you learn more?  Go to https://www.[employer].net/patiented  Enter V684 in the search box to learn more about \"Bladder Training: Care Instructions.\"  Current as of: April 30, 2024  Content Version: 14.4    1726-6720 [employer].   Care instructions adapted under license by your healthcare professional. If you have questions about a medical condition or this instruction, always ask your healthcare professional. [employer] disclaims any warranty or liability for your use of this information.       "

## 2025-04-22 ENCOUNTER — PATIENT OUTREACH (OUTPATIENT)
Dept: CARE COORDINATION | Facility: CLINIC | Age: 70
End: 2025-04-22
Payer: COMMERCIAL

## 2025-04-22 LAB
ALBUMIN SERPL BCG-MCNC: 4.4 G/DL (ref 3.5–5.2)
ALP SERPL-CCNC: 87 U/L (ref 40–150)
ALT SERPL W P-5'-P-CCNC: 33 U/L (ref 0–50)
ANION GAP SERPL CALCULATED.3IONS-SCNC: 10 MMOL/L (ref 7–15)
AST SERPL W P-5'-P-CCNC: 22 U/L (ref 0–45)
BILIRUB SERPL-MCNC: 1.4 MG/DL
BUN SERPL-MCNC: 11 MG/DL (ref 8–23)
CALCIUM SERPL-MCNC: 9 MG/DL (ref 8.8–10.4)
CHLORIDE SERPL-SCNC: 104 MMOL/L (ref 98–107)
CHOLEST SERPL-MCNC: 117 MG/DL
CREAT SERPL-MCNC: 0.68 MG/DL (ref 0.51–0.95)
EGFRCR SERPLBLD CKD-EPI 2021: >90 ML/MIN/1.73M2
FASTING STATUS PATIENT QL REPORTED: YES
FASTING STATUS PATIENT QL REPORTED: YES
GLUCOSE SERPL-MCNC: 190 MG/DL (ref 70–99)
HCO3 SERPL-SCNC: 26 MMOL/L (ref 22–29)
HDLC SERPL-MCNC: 39 MG/DL
LDLC SERPL CALC-MCNC: 56 MG/DL
NONHDLC SERPL-MCNC: 78 MG/DL
POTASSIUM SERPL-SCNC: 4.6 MMOL/L (ref 3.4–5.3)
PROT SERPL-MCNC: 7.3 G/DL (ref 6.4–8.3)
SODIUM SERPL-SCNC: 140 MMOL/L (ref 135–145)
TRIGL SERPL-MCNC: 110 MG/DL
TSH SERPL DL<=0.005 MIU/L-ACNC: 1.05 UIU/ML (ref 0.3–4.2)

## 2025-05-04 ENCOUNTER — HEALTH MAINTENANCE LETTER (OUTPATIENT)
Age: 70
End: 2025-05-04

## 2025-05-13 ENCOUNTER — ANCILLARY PROCEDURE (OUTPATIENT)
Dept: MAMMOGRAPHY | Facility: CLINIC | Age: 70
End: 2025-05-13
Attending: FAMILY MEDICINE
Payer: COMMERCIAL

## 2025-05-13 DIAGNOSIS — Z12.31 ENCOUNTER FOR SCREENING MAMMOGRAM FOR BREAST CANCER: ICD-10-CM

## 2025-05-13 PROCEDURE — 77063 BREAST TOMOSYNTHESIS BI: CPT

## 2025-05-28 DIAGNOSIS — E78.00 HYPERCHOLESTEREMIA: ICD-10-CM

## 2025-05-30 DIAGNOSIS — E03.9 HYPOTHYROIDISM: ICD-10-CM

## 2025-05-30 DIAGNOSIS — I48.3 TYPICAL ATRIAL FLUTTER (H): ICD-10-CM

## 2025-06-02 ENCOUNTER — NURSE TRIAGE (OUTPATIENT)
Dept: FAMILY MEDICINE | Facility: CLINIC | Age: 70
End: 2025-06-02
Payer: COMMERCIAL

## 2025-06-02 RX ORDER — APIXABAN 5 MG/1
5 TABLET, FILM COATED ORAL
Qty: 180 TABLET | Refills: 1 | Status: SHIPPED | OUTPATIENT
Start: 2025-06-02

## 2025-06-02 RX ORDER — LEVOTHYROXINE SODIUM 75 UG/1
TABLET ORAL
Qty: 90 TABLET | Refills: 1 | Status: SHIPPED | OUTPATIENT
Start: 2025-06-02

## 2025-06-02 RX ORDER — METOPROLOL SUCCINATE 50 MG/1
50 TABLET, EXTENDED RELEASE ORAL 2 TIMES DAILY
Qty: 180 TABLET | Refills: 1 | Status: SHIPPED | OUTPATIENT
Start: 2025-06-02

## 2025-06-02 NOTE — TELEPHONE ENCOUNTER
PRIMARY DIAGNOSIS: ACUTE abdominal PAIN/weakness  OUTPATIENT/OBSERVATION GOALS TO BE MET BEFORE DISCHARGE:  1. Pain Status: Improved with use of  carafate      2. Return to near baseline physical activity: No     3. Cleared for discharge by consultants (if involved): No     Discharge Planner Nurse   Safe discharge environment identified: Lives alone, if needs assistance would need TCU placement   Barriers to discharge: Yes     Pt AxO x4. SBA with walker. Pt is independent at baseline. Pt. Denies pain after carafate, tolerated ice cream, apple sauce, and sprite.  No nausea at this time.  Abd US shows nothing acute. Speech consult due to swallowing concerns, per day RN report, pt struggled a little with medication.  Pt.'s has crackles in all lobes, no assessment documented to compare.  Coughing encouraged. Pt has no IV access, Tylenol given with applesauce.    Plan is to encourage PO intake, speech consult, pain management   Yes, ok to see in am

## 2025-06-03 ENCOUNTER — ANCILLARY PROCEDURE (OUTPATIENT)
Dept: GENERAL RADIOLOGY | Facility: CLINIC | Age: 70
End: 2025-06-03
Attending: FAMILY MEDICINE
Payer: COMMERCIAL

## 2025-06-03 ENCOUNTER — OFFICE VISIT (OUTPATIENT)
Dept: FAMILY MEDICINE | Facility: CLINIC | Age: 70
End: 2025-06-03
Payer: COMMERCIAL

## 2025-06-03 VITALS
RESPIRATION RATE: 16 BRPM | TEMPERATURE: 98.1 F | DIASTOLIC BLOOD PRESSURE: 81 MMHG | WEIGHT: 212.4 LBS | SYSTOLIC BLOOD PRESSURE: 126 MMHG | HEIGHT: 71 IN | HEART RATE: 72 BPM | OXYGEN SATURATION: 95 % | BODY MASS INDEX: 29.73 KG/M2

## 2025-06-03 DIAGNOSIS — K52.81 EOSINOPHILIC GASTROENTERITIS: Chronic | ICD-10-CM

## 2025-06-03 DIAGNOSIS — R05.3 CHRONIC COUGH: Primary | ICD-10-CM

## 2025-06-03 DIAGNOSIS — R05.3 CHRONIC COUGH: ICD-10-CM

## 2025-06-03 PROCEDURE — 99214 OFFICE O/P EST MOD 30 MIN: CPT | Performed by: FAMILY MEDICINE

## 2025-06-03 PROCEDURE — G2211 COMPLEX E/M VISIT ADD ON: HCPCS | Performed by: FAMILY MEDICINE

## 2025-06-03 PROCEDURE — 71046 X-RAY EXAM CHEST 2 VIEWS: CPT | Mod: TC | Performed by: RADIOLOGY

## 2025-06-03 PROCEDURE — 3074F SYST BP LT 130 MM HG: CPT | Performed by: FAMILY MEDICINE

## 2025-06-03 PROCEDURE — 3079F DIAST BP 80-89 MM HG: CPT | Performed by: FAMILY MEDICINE

## 2025-06-03 RX ORDER — OMEPRAZOLE 40 MG/1
40 CAPSULE, DELAYED RELEASE ORAL DAILY
Qty: 30 CAPSULE | Refills: 1 | Status: SHIPPED | OUTPATIENT
Start: 2025-06-03

## 2025-06-03 NOTE — PROGRESS NOTES
Assessment/ Plan     1. Chronic cough (Primary)  Claudia has had at least a 1 year history of cough when laying down.  This is likely related to reflux and/or eosinophilic esophagitis that was potentially previously diagnosed.  She has not been on any medications will have her start omeprazole 40 mg once daily.  Will put in a referral for GI in case things are not improving over the next 4 to 6 weeks.  She may need an EGD at that time.  - omeprazole (PRILOSEC) 40 MG DR capsule; Take 1 capsule (40 mg) by mouth daily.  Dispense: 30 capsule; Refill: 1  - XR Chest 2 Views; Future    2. Eosinophilic gastroenteritis  This was on her problem list, but I cannot find any specific limitation of the diagnosis.  - Adult GI  Referral - Consult Only; Future      Subjective:      Claudia Colunga is a 69 year old female who presents for discussion of ongoing cough.  She states that she thinks this has been at least over a year as she talked to the gastroenterologist about it when she had her colonoscopy last year.  She states that when she lays down in particular she will have coughing fits and sometimes she coughs so hard she feels like she is going to throw up but does not.  It is hard to catch her breath when she is coughing like that.  It occasionally will happen when she is upright.  She will feel a pressure sensation in the epigastrium and then she feels like she has to cough and get some phlegm out.  She is not feeling sick like a virus.  No chest pain or shortness of breath.  She states sometimes she does hear some wheezing.  She has no history of asthma.  She states years ago she had an endoscopy but I cannot find the results.  She has eosinophilic esophagitis on her problem list, but I cannot find documentation of who diagnosed her with this.  She states she has never been on any type of medication in the past.  She is not a smoker.    The longitudinal plan of care for the diagnosis(es)/condition(s) as documented  "were addressed during this visit. Due to the added complexity in care, I will continue to support Claudia in the subsequent management and with ongoing continuity of care.     Relevant past medical, family, surgical, and social history reviewed with patient, unless noted in HPI, not pertinent for this visit.  Medications were discussed and reconciled.   Review of Systems   A 12 point comprehensive review of systems was negative except as noted.      Current Outpatient Medications   Medication Sig Dispense Refill    apixaban ANTICOAGULANT (ELIQUIS ANTICOAGULANT) 5 MG tablet TAKE 1 TABLET BY MOUTH TWICE A  tablet 1    atorvastatin (LIPITOR) 20 MG tablet Take 1 tablet (20 mg) by mouth daily (Patient taking differently: Take 10 mg by mouth daily.) 90 tablet 3    levothyroxine (SYNTHROID/LEVOTHROID) 75 MCG tablet TAKE 1 TABLET BY MOUTH ONCE DAILY AT 6AM. 90 tablet 1    metoprolol succinate ER (TOPROL XL) 50 MG 24 hr tablet TAKE 1 TABLET BY MOUTH TWICE A  tablet 1    multivitamin capsule [MULTIVITAMIN CAPSULE] Take 1 capsule by mouth daily.      omeprazole (PRILOSEC) 40 MG DR capsule Take 1 capsule (40 mg) by mouth daily. 30 capsule 1         Objective:     /81   Pulse 72   Temp 98.1  F (36.7  C)   Resp 16   Ht 1.803 m (5' 11\")   Wt 96.3 kg (212 lb 6.4 oz)   SpO2 95%   BMI 29.62 kg/m      Body mass index is 29.62 kg/m .       General appearance: alert, appears stated age and cooperative  Lungs: clear to auscultation bilaterally  Heart: regular rate and rhythm, S1, S2 normal, no murmur, click, rub or gallop  Abdomen: soft, non-tender; bowel sounds normal; no masses,  no organomegaly       Chest xray: Negative    No results found for this or any previous visit (from the past week).       This note has been dictated using voice recognition software. Any grammatical or context distortions are unintentional and inherent to the software  "

## 2025-06-04 ENCOUNTER — PATIENT OUTREACH (OUTPATIENT)
Dept: CARE COORDINATION | Facility: CLINIC | Age: 70
End: 2025-06-04
Payer: COMMERCIAL

## 2025-06-04 RX ORDER — ATORVASTATIN CALCIUM 20 MG/1
20 TABLET, FILM COATED ORAL DAILY
Qty: 90 TABLET | Refills: 3 | Status: SHIPPED | OUTPATIENT
Start: 2025-06-04

## 2025-06-26 DIAGNOSIS — R05.3 CHRONIC COUGH: ICD-10-CM

## 2025-06-26 RX ORDER — OMEPRAZOLE 40 MG/1
40 CAPSULE, DELAYED RELEASE ORAL DAILY
Qty: 90 CAPSULE | Refills: 1 | Status: SHIPPED | OUTPATIENT
Start: 2025-06-26

## 2025-07-15 ENCOUNTER — OFFICE VISIT (OUTPATIENT)
Dept: FAMILY MEDICINE | Facility: CLINIC | Age: 70
End: 2025-07-15
Attending: FAMILY MEDICINE
Payer: COMMERCIAL

## 2025-07-15 VITALS
OXYGEN SATURATION: 95 % | WEIGHT: 206.8 LBS | SYSTOLIC BLOOD PRESSURE: 124 MMHG | RESPIRATION RATE: 16 BRPM | DIASTOLIC BLOOD PRESSURE: 73 MMHG | HEART RATE: 59 BPM | HEIGHT: 71 IN | BODY MASS INDEX: 28.95 KG/M2 | TEMPERATURE: 97.7 F

## 2025-07-15 DIAGNOSIS — E11.9 TYPE 2 DIABETES MELLITUS WITHOUT COMPLICATION, WITHOUT LONG-TERM CURRENT USE OF INSULIN (H): Primary | ICD-10-CM

## 2025-07-15 DIAGNOSIS — I10 ESSENTIAL HYPERTENSION: Chronic | ICD-10-CM

## 2025-07-15 DIAGNOSIS — R05.3 CHRONIC COUGH: ICD-10-CM

## 2025-07-15 LAB
EST. AVERAGE GLUCOSE BLD GHB EST-MCNC: 163 MG/DL
HBA1C MFR BLD: 7.3 % (ref 0–5.6)
HOLD SPECIMEN: NORMAL
HOLD SPECIMEN: NORMAL

## 2025-07-15 PROCEDURE — G2211 COMPLEX E/M VISIT ADD ON: HCPCS | Performed by: FAMILY MEDICINE

## 2025-07-15 PROCEDURE — 3074F SYST BP LT 130 MM HG: CPT | Performed by: FAMILY MEDICINE

## 2025-07-15 PROCEDURE — 99214 OFFICE O/P EST MOD 30 MIN: CPT | Performed by: FAMILY MEDICINE

## 2025-07-15 PROCEDURE — 3051F HG A1C>EQUAL 7.0%<8.0%: CPT | Performed by: FAMILY MEDICINE

## 2025-07-15 PROCEDURE — 36415 COLL VENOUS BLD VENIPUNCTURE: CPT | Performed by: FAMILY MEDICINE

## 2025-07-15 PROCEDURE — 3078F DIAST BP <80 MM HG: CPT | Performed by: FAMILY MEDICINE

## 2025-07-15 PROCEDURE — 83036 HEMOGLOBIN GLYCOSYLATED A1C: CPT | Performed by: FAMILY MEDICINE

## 2025-07-15 ASSESSMENT — ANXIETY QUESTIONNAIRES
GAD7 TOTAL SCORE: 0
4. TROUBLE RELAXING: NOT AT ALL
GAD7 TOTAL SCORE: 0
1. FEELING NERVOUS, ANXIOUS, OR ON EDGE: NOT AT ALL
GAD7 TOTAL SCORE: 0
7. FEELING AFRAID AS IF SOMETHING AWFUL MIGHT HAPPEN: NOT AT ALL
6. BECOMING EASILY ANNOYED OR IRRITABLE: NOT AT ALL
7. FEELING AFRAID AS IF SOMETHING AWFUL MIGHT HAPPEN: NOT AT ALL
2. NOT BEING ABLE TO STOP OR CONTROL WORRYING: NOT AT ALL
IF YOU CHECKED OFF ANY PROBLEMS ON THIS QUESTIONNAIRE, HOW DIFFICULT HAVE THESE PROBLEMS MADE IT FOR YOU TO DO YOUR WORK, TAKE CARE OF THINGS AT HOME, OR GET ALONG WITH OTHER PEOPLE: NOT DIFFICULT AT ALL
3. WORRYING TOO MUCH ABOUT DIFFERENT THINGS: NOT AT ALL
8. IF YOU CHECKED OFF ANY PROBLEMS, HOW DIFFICULT HAVE THESE MADE IT FOR YOU TO DO YOUR WORK, TAKE CARE OF THINGS AT HOME, OR GET ALONG WITH OTHER PEOPLE?: NOT DIFFICULT AT ALL
5. BEING SO RESTLESS THAT IT IS HARD TO SIT STILL: NOT AT ALL

## 2025-07-15 NOTE — PROGRESS NOTES
Assessment/ Plan     1. Type 2 diabetes mellitus without complication, without long-term current use of insulin (H) (Primary)  Claudia returns to recheck on her type 2 diabetes.  She was able to bring her A1c down from 8.1 down to 7.3 with just increasing her exercise.  She would like to avoid starting medication at this time and will come back in for a lab only in 3 months to make sure it is not trending back up.  She will then see me in person in 6 months.  She does take a statin.  - Hemoglobin A1c; Future  - Hemoglobin A1c    2. Chronic cough  When I saw her last, she started on omeprazole and the chronic cough is maybe 25% improved.  She has a follow-up with her gastroenterologist.    3. Essential hypertension  Well-controlled current medication.      Subjective:      Claudia Colunga is a 69 year old female who presents for follow-up on her type 2 diabetes.  When I saw her last, her A1c had gone up to 8.1 from 6.9 previously.  She has been diet controlled and would like to start medication as she is worried about the side effects of metformin.  She is been doing a lot more swimming.  She has been able to bring it down quite a bit.    The longitudinal plan of care for the diagnosis(es)/condition(s) as documented were addressed during this visit. Due to the added complexity in care, I will continue to support Claudia in the subsequent management and with ongoing continuity of care.     Relevant past medical, family, surgical, and social history reviewed with patient, unless noted in HPI, not pertinent for this visit.  Medications were discussed and reconciled.   Review of Systems   A 12 point comprehensive review of systems was negative except as noted.      Current Outpatient Medications   Medication Sig Dispense Refill    apixaban ANTICOAGULANT (ELIQUIS ANTICOAGULANT) 5 MG tablet TAKE 1 TABLET BY MOUTH TWICE A  tablet 1    atorvastatin (LIPITOR) 20 MG tablet TAKE 1 TABLET BY MOUTH EVERY DAY 90 tablet 3     "levothyroxine (SYNTHROID/LEVOTHROID) 75 MCG tablet TAKE 1 TABLET BY MOUTH ONCE DAILY AT 6AM. 90 tablet 1    metoprolol succinate ER (TOPROL XL) 50 MG 24 hr tablet TAKE 1 TABLET BY MOUTH TWICE A  tablet 1    multivitamin capsule [MULTIVITAMIN CAPSULE] Take 1 capsule by mouth daily.      omeprazole (PRILOSEC) 40 MG DR capsule TAKE 1 CAPSULE BY MOUTH EVERY DAY 90 capsule 1         Objective:     /73   Pulse 59   Temp 97.7  F (36.5  C)   Resp 16   Ht 1.803 m (5' 11\")   Wt 93.8 kg (206 lb 12.8 oz)   SpO2 95%   BMI 28.84 kg/m      Body mass index is 28.84 kg/m .       General appearance: alert, appears stated age and cooperative         Recent Results (from the past week)   Hemoglobin A1c   Result Value Ref Range    Estimated Average Glucose 163 (H) <117 mg/dL    Hemoglobin A1C 7.3 (H) 0.0 - 5.6 %          This note has been dictated using voice recognition software. Any grammatical or context distortions are unintentional and inherent to the software  "

## 2109-07-24 ENCOUNTER — RECORDS - HEALTHEAST (OUTPATIENT)
Dept: ADMINISTRATIVE | Facility: OTHER | Age: OVER 89
End: 2109-07-24